# Patient Record
Sex: FEMALE | Race: WHITE | Employment: OTHER | URBAN - METROPOLITAN AREA
[De-identification: names, ages, dates, MRNs, and addresses within clinical notes are randomized per-mention and may not be internally consistent; named-entity substitution may affect disease eponyms.]

---

## 2021-03-17 ENCOUNTER — APPOINTMENT (OUTPATIENT)
Dept: CT IMAGING | Age: 76
DRG: 457 | End: 2021-03-17
Attending: EMERGENCY MEDICINE
Payer: MEDICARE

## 2021-03-17 ENCOUNTER — APPOINTMENT (OUTPATIENT)
Dept: MRI IMAGING | Age: 76
DRG: 457 | End: 2021-03-17
Attending: EMERGENCY MEDICINE
Payer: MEDICARE

## 2021-03-17 ENCOUNTER — HOSPITAL ENCOUNTER (INPATIENT)
Age: 76
LOS: 8 days | Discharge: NURSING, REHAB OR LONG-TERM CARE | DRG: 457 | End: 2021-03-25
Attending: EMERGENCY MEDICINE | Admitting: INTERNAL MEDICINE
Payer: MEDICARE

## 2021-03-17 DIAGNOSIS — S22.080A T12 COMPRESSION FRACTURE, INITIAL ENCOUNTER (HCC): Primary | ICD-10-CM

## 2021-03-17 DIAGNOSIS — M62.830 MUSCLE SPASM OF BACK: ICD-10-CM

## 2021-03-17 PROBLEM — M54.9 BACK PAIN: Status: ACTIVE | Noted: 2021-03-17

## 2021-03-17 PROBLEM — S22.000A COMPRESSION FRACTURE OF BODY OF THORACIC VERTEBRA (HCC): Status: ACTIVE | Noted: 2021-03-17

## 2021-03-17 LAB
GLUCOSE BLD STRIP.AUTO-MCNC: 108 MG/DL (ref 65–100)
SERVICE CMNT-IMP: ABNORMAL

## 2021-03-17 PROCEDURE — 72131 CT LUMBAR SPINE W/O DYE: CPT

## 2021-03-17 PROCEDURE — 82962 GLUCOSE BLOOD TEST: CPT

## 2021-03-17 PROCEDURE — 65270000029 HC RM PRIVATE

## 2021-03-17 PROCEDURE — 74011250637 HC RX REV CODE- 250/637: Performed by: EMERGENCY MEDICINE

## 2021-03-17 PROCEDURE — 99285 EMERGENCY DEPT VISIT HI MDM: CPT

## 2021-03-17 PROCEDURE — 74011250637 HC RX REV CODE- 250/637: Performed by: INTERNAL MEDICINE

## 2021-03-17 PROCEDURE — 74011250636 HC RX REV CODE- 250/636: Performed by: EMERGENCY MEDICINE

## 2021-03-17 PROCEDURE — 74011000250 HC RX REV CODE- 250: Performed by: EMERGENCY MEDICINE

## 2021-03-17 PROCEDURE — 74011250636 HC RX REV CODE- 250/636: Performed by: INTERNAL MEDICINE

## 2021-03-17 PROCEDURE — 72146 MRI CHEST SPINE W/O DYE: CPT

## 2021-03-17 RX ORDER — DIAZEPAM 5 MG/1
2.5 TABLET ORAL
Status: COMPLETED | OUTPATIENT
Start: 2021-03-17 | End: 2021-03-17

## 2021-03-17 RX ORDER — SODIUM CHLORIDE 0.9 % (FLUSH) 0.9 %
5-40 SYRINGE (ML) INJECTION EVERY 8 HOURS
Status: DISCONTINUED | OUTPATIENT
Start: 2021-03-17 | End: 2021-03-19 | Stop reason: SDUPTHER

## 2021-03-17 RX ORDER — MAGNESIUM SULFATE 100 %
4 CRYSTALS MISCELLANEOUS AS NEEDED
Status: DISCONTINUED | OUTPATIENT
Start: 2021-03-17 | End: 2021-03-25 | Stop reason: HOSPADM

## 2021-03-17 RX ORDER — DEXTROSE 50 % IN WATER (D50W) INTRAVENOUS SYRINGE
12.5-25 AS NEEDED
Status: DISCONTINUED | OUTPATIENT
Start: 2021-03-17 | End: 2021-03-25 | Stop reason: HOSPADM

## 2021-03-17 RX ORDER — MORPHINE SULFATE 2 MG/ML
4 INJECTION, SOLUTION INTRAMUSCULAR; INTRAVENOUS
Status: COMPLETED | OUTPATIENT
Start: 2021-03-17 | End: 2021-03-18

## 2021-03-17 RX ORDER — PROMETHAZINE HYDROCHLORIDE 25 MG/1
12.5 TABLET ORAL
Status: DISCONTINUED | OUTPATIENT
Start: 2021-03-17 | End: 2021-03-25 | Stop reason: HOSPADM

## 2021-03-17 RX ORDER — KETOROLAC TROMETHAMINE 30 MG/ML
15 INJECTION, SOLUTION INTRAMUSCULAR; INTRAVENOUS ONCE
Status: COMPLETED | OUTPATIENT
Start: 2021-03-17 | End: 2021-03-17

## 2021-03-17 RX ORDER — ONDANSETRON 2 MG/ML
4 INJECTION INTRAMUSCULAR; INTRAVENOUS
Status: DISCONTINUED | OUTPATIENT
Start: 2021-03-17 | End: 2021-03-25 | Stop reason: HOSPADM

## 2021-03-17 RX ORDER — INSULIN LISPRO 100 [IU]/ML
INJECTION, SOLUTION INTRAVENOUS; SUBCUTANEOUS
Status: DISCONTINUED | OUTPATIENT
Start: 2021-03-18 | End: 2021-03-25 | Stop reason: HOSPADM

## 2021-03-17 RX ORDER — ACETAMINOPHEN 325 MG/1
650 TABLET ORAL
Status: DISCONTINUED | OUTPATIENT
Start: 2021-03-17 | End: 2021-03-25 | Stop reason: HOSPADM

## 2021-03-17 RX ORDER — SODIUM CHLORIDE 0.9 % (FLUSH) 0.9 %
5-40 SYRINGE (ML) INJECTION AS NEEDED
Status: DISCONTINUED | OUTPATIENT
Start: 2021-03-17 | End: 2021-03-19 | Stop reason: SDUPTHER

## 2021-03-17 RX ORDER — POLYETHYLENE GLYCOL 3350 17 G/17G
17 POWDER, FOR SOLUTION ORAL DAILY PRN
Status: DISCONTINUED | OUTPATIENT
Start: 2021-03-17 | End: 2021-03-21

## 2021-03-17 RX ORDER — LIDOCAINE 4 G/100G
1 PATCH TOPICAL
Status: COMPLETED | OUTPATIENT
Start: 2021-03-17 | End: 2021-03-18

## 2021-03-17 RX ORDER — HYDRALAZINE HYDROCHLORIDE 20 MG/ML
10 INJECTION INTRAMUSCULAR; INTRAVENOUS
Status: DISCONTINUED | OUTPATIENT
Start: 2021-03-17 | End: 2021-03-19

## 2021-03-17 RX ORDER — MORPHINE SULFATE 2 MG/ML
2 INJECTION, SOLUTION INTRAMUSCULAR; INTRAVENOUS
Status: DISCONTINUED | OUTPATIENT
Start: 2021-03-17 | End: 2021-03-18 | Stop reason: ALTCHOICE

## 2021-03-17 RX ORDER — ACETAMINOPHEN 650 MG/1
650 SUPPOSITORY RECTAL
Status: DISCONTINUED | OUTPATIENT
Start: 2021-03-17 | End: 2021-03-25 | Stop reason: HOSPADM

## 2021-03-17 RX ADMIN — KETOROLAC TROMETHAMINE 15 MG: 30 INJECTION, SOLUTION INTRAMUSCULAR at 15:52

## 2021-03-17 RX ADMIN — DIAZEPAM 2.5 MG: 5 TABLET ORAL at 15:51

## 2021-03-17 RX ADMIN — ACETAMINOPHEN 650 MG: 325 TABLET ORAL at 20:55

## 2021-03-17 RX ADMIN — DIAZEPAM 2.5 MG: 5 TABLET ORAL at 18:48

## 2021-03-17 RX ADMIN — MORPHINE SULFATE 2 MG: 2 INJECTION, SOLUTION INTRAMUSCULAR; INTRAVENOUS at 18:49

## 2021-03-17 NOTE — CONSULTS
76yo with progressive back pain that started 2 weeks ago. She thought she just strained her back doing housework but the pain has progressed to the point it is intolerable. No neurologic deficit. CT shows compression T12 with some retropulsion. Agree with plan for MRI and pain control. MRI should be with and without contrast to rule out underlying lesion (pathologic fracture). Further recommendations when MRI completed. Thank you for this consultation.

## 2021-03-17 NOTE — H&P
Hospitalist Admission Note    NAME: Sotero Medel   :     MRN:  [id number]     Date/Time:  3/17/2021 6:46 PM    Patient PCP: Mannie Caban MD  ______________________________________________________________________  Given the patient's current clinical presentation, I have a high level of concern for decompensation if discharged from the emergency department. Complex decision making was performed, which includes reviewing the patient's available past medical records, laboratory results, and x-ray films. My assessment of this patient's clinical condition and my plan of care is as follows. Assessment / Plan:  Severe lumbago POA  Severe compression deformity at T12 subacute/acute POA    CT spine  Shows t12 compression deformity and 5mm retropulsion of bone at superior endplate and central stenosis at T11-12.  -Neurosurgery called by ED  -MRI spine pending  -Pain control  -If MRI spine positive for acute deformity, will ask IR for kyphoplasty  -May need spinal brace  -PT/OT evaluation      History of hypertension, history of diabetes mellitus  SSI    Code Status: Full Code  Surrogate Decision Maker:sister    DVT Prophylaxis: SCD  GI Prophylaxis: not indicated    Baseline: ambulatory      Subjective:   CHIEF COMPLAINT: Pain    HISTORY OF PRESENT ILLNESS:     Sotero Medel is a 77 y.o.  female with a past medical history of hypertension and diabetes mellitus, scented to ED with a chief complaint of back pain. She reported that 2 weeks ago she pulled her back, and since then she has been having lower back pain, which is getting worse. She also complains of muscle spasm on the right hip. Due to the severity of pain she could not ambulate. She went to urgent care, and given steroids, which did not help. She had a CT spine in the ED, which showed compression deformity at T12, neurosurgery was consulted, recommends MRI and may need brace.   Patient currently in severe pain, unable to lay flat for a SLRT    We were asked to admit for work up and evaluation of the above problems. History reviewed. No pertinent past medical history. No past surgical history on file. Social History     Tobacco Use    Smoking status: Not on file   Substance Use Topics    Alcohol use: Not on file        History reviewed. No pertinent family history. Allergies   Allergen Reactions    Pcn [Penicillins] Hives        Prior to Admission medications    Not on File       REVIEW OF SYSTEMS:     I am not able to complete the review of systems because:    The patient is intubated and sedated    The patient has altered mental status due to his acute medical problems    The patient has baseline aphasia from prior stroke(s)    The patient has baseline dementia and is not reliable historian    The patient is in acute medical distress and unable to provide information           Total of 12 systems reviewed as follows:       POSITIVE= underlined text  Negative = text not underlined  General:  fever, chills, sweats, generalized weakness, weight loss/gain,      loss of appetite   Eyes:    blurred vision, eye pain, loss of vision, double vision  ENT:    rhinorrhea, pharyngitis   Respiratory:   cough, sputum production, SOB, COULTER, wheezing, pleuritic pain   Cardiology:   chest pain, palpitations, orthopnea, PND, edema, syncope   Gastrointestinal:  abdominal pain , N/V, diarrhea, dysphagia, constipation, bleeding   Genitourinary:  frequency, urgency, dysuria, hematuria, incontinence   Muskuloskeletal :  arthralgia, myalgia, back pain  Hematology:  easy bruising, nose or gum bleeding, lymphadenopathy   Dermatological: rash, ulceration, pruritis, color change / jaundice  Endocrine:   hot flashes or polydipsia   Neurological:  headache, dizziness, confusion, focal weakness, paresthesia,     Speech difficulties, memory loss, gait difficulty  Psychological: Feelings of anxiety, depression, agitation    Objective:   VITALS:    Visit Vitals  BP (!) 141/65   Pulse (!) 104   Temp 98.2 °F (36.8 °C)   Resp 16   Ht 5' 7\" (1.702 m)   Wt 68 kg (150 lb)   SpO2 94%   BMI 23.49 kg/m²       PHYSICAL EXAM:    General:    Alert, cooperative, no distress, appears stated age. HEENT: Atraumatic, anicteric sclerae, pink conjunctivae  Neck:  Supple, symmetrical,  thyroid: non tender  Lungs:   Clear to auscultation bilaterally. No Wheezing or Rhonchi. No rales. Chest wall:  No tenderness  No Accessory muscle use. Heart:   Regular  rhythm,  No  murmur   No edema  Abdomen:   Soft, non-tender. Not distended. Bowel sounds normal  Extremities: No cyanosis. No clubbing,    Skin:     Not pale. Not Jaundiced  No rashes   Psych:  Good insight. Not depressed. Not anxious or agitated. Neurologic: EOMs intact. No facial asymmetry. No aphasia or slurred speech. Symmetrical strength, Sensation grossly intact. Alert and oriented X 4. No spinal tenderness. _______________________________________________________________________  Care Plan discussed with:    Comments   Patient x    Family      RN x    Care Manager                    Consultant:      _______________________________________________________________________  Expected  Disposition:   Home with Family x   HH/PT/OT/RN    SNF/LTC    [facility]CAR    ________________________________________________________________________  TOTAL TIME:  29 Minutes    Critical Care Provided     Minutes non procedure based      Comments     Reviewed previous records   >50% of visit spent in counseling and coordination of care  Discussion with patient and/or family and questions answered       ________________________________________________________________________  Signed: Elin Qureshi MD    Procedures: see electronic medical records for all procedures/Xrays and details which were not copied into this note but were reviewed prior to creation of Plan.     LAB DATA REVIEWED:    No results found for this or any previous visit (from the past 24 hour(s)).

## 2021-03-17 NOTE — ROUTINE PROCESS

## 2021-03-17 NOTE — ED NOTES
TRANSFER - OUT REPORT:    Verbal report given to Sim RN(name) on Valerie Ros  being transferred to room 3214(unit) for routine progression of care       Report consisted of patients Situation, Background, Assessment and   Recommendations(SBAR). Information from the following report(s) SBAR, Kardex, ED Summary, Recent Results and Med Rec Status was reviewed with the receiving nurse. Lines:       Opportunity for questions and clarification was provided.       Patient transported with:   [employer]

## 2021-03-17 NOTE — ED NOTES
Patient belongings brought to MRI. Included a zipped purse and patient ED Chart.  MRI staff will transport pt to her IP room

## 2021-03-17 NOTE — ED NOTES
Bedside shift change report given to [address] (oncoming nurse) by Eboni Harden (offgoing nurse). Report included the following information SBAR, ED Summary, MAR and Recent Results.

## 2021-03-17 NOTE — ED PROVIDER NOTES
EMERGENCY DEPARTMENT HISTORY AND PHYSICAL EXAM      Date: 3/17/2021  Patient Name: Jeff Sheppard    History of Presenting Illness     Chief Complaint   Patient presents with    Back Pain     pt pulled her back while housecleaning at end of february and pain has gotten so bad that she is nonambulatory at this point. low back pain with spasms radiating down the fronts of both legs. History Provided By: Patient    HPI: Jeff Sheppard, 77 y.o. female  presents to the ED with cc of severe low back pain. Patient states she has pain in her low back that radiates down both hips and into the front of both of her thighs. She states the pain has been present since for about 2 weeks but is getting worse. She believes she may have done something while she was cleaning her house. She has been seen by urgent care and was started on a prednisone Dosepak, Robaxin, and Tylenol 3 without any relief. No trouble with bowel or bladder function. She is not have any urinary retention or incontinence. She does have a history of back injury but no prior surgeries. Past History     Past Medical History:  History reviewed. No pertinent past medical history. Past Surgical History:  No past surgical history on file. Medications:  No current facility-administered medications on file prior to encounter. No current outpatient medications on file prior to encounter. Family History:  History reviewed. No pertinent family history. Social History:  Social History     Tobacco Use    Smoking status: Not on file   Substance Use Topics    Alcohol use: Not on file    Drug use: Not on file       Allergies: Allergies   Allergen Reactions    Pcn [Penicillins] Hives       All the above components of the past  history are auto-populated from the electronic record.   They have been reviewed and the patient has been interviewed for any pertinent past history that pertains to the patient's chief complaint and reason for visit. Not all pre-populated components may be accurate at the time this note was generated. Review of Systems   Review of Systems   Constitutional: Negative for chills and fever. HENT: Negative for congestion, ear pain, rhinorrhea, sore throat and trouble swallowing. Eyes: Negative for visual disturbance. Respiratory: Negative for cough, chest tightness and shortness of breath. Cardiovascular: Negative for chest pain and palpitations. Gastrointestinal: Negative for abdominal pain, blood in stool, constipation, diarrhea, nausea and vomiting. Genitourinary: Negative for decreased urine volume, difficulty urinating, dysuria and frequency. Musculoskeletal: Positive for back pain. Negative for neck pain. Skin: Negative for color change and rash. Neurological: Negative for dizziness, weakness, light-headedness and headaches. Physical Exam   Physical Exam  Vitals signs and nursing note reviewed. Constitutional:       General: She is not in acute distress. Appearance: She is well-developed. She is not ill-appearing. HENT:      Head: Normocephalic. Eyes:      Conjunctiva/sclera: Conjunctivae normal.   Neck:      Musculoskeletal: Normal range of motion. Cardiovascular:      Rate and Rhythm: Normal rate and regular rhythm. Pulmonary:      Effort: Pulmonary effort is normal. No accessory muscle usage or respiratory distress. Abdominal:      General: There is no distension. Musculoskeletal:      Lumbar back: She exhibits decreased range of motion, pain and spasm. She exhibits no bony tenderness. Back:    Skin:     General: Skin is warm and dry. Neurological:      Mental Status: She is alert and oriented to person, place, and time.          Due to the COVID-19 pandemic, in order to reduce the spread and transmission of the virus, some basic elements of the physical exam have been deferred to reduce direct or close contact with the patient unless they are deemed to be absolutely necessary, regardless of whether the virus is highly suspected or not. Diagnostic Study Results     Labs -   No results found for this or any previous visit (from the past 24 hour(s)). Radiologic Studies -   CT SPINE LUMB WO CONT   Final Result   . IMPRESSION:    1. Severe compression deformity at T12, which may be subacute or acute, with 5   mm associated retropulsion of bone at the superior endplate and central stenosis   at T11-12. 2. Multilevel degenerative change otherwise with central and foraminal stenosis. 3. Left adrenal adenoma or cyst...   4. Generalized osteopenia with a single focal area of low density in the   vertebral body of L2, of uncertain if any clinical significance. Correlate with   clinical history. Further evaluation would require lumbar spine MRI. MRI [facility] SPINE WO CONT    (Results Pending)     CT Results  (Last 48 hours)               03/17/21 1624  CT SPINE LUMB WO CONT Final result    Impression:  . IMPRESSION:    1. Severe compression deformity at T12, which may be subacute or acute, with 5   mm associated retropulsion of bone at the superior endplate and central stenosis   at T11-12. 2. Multilevel degenerative change otherwise with central and foraminal stenosis. 3. Left adrenal adenoma or cyst...   4. Generalized osteopenia with a single focal area of low density in the   vertebral body of L2, of uncertain if any clinical significance. Correlate with   clinical history. Further evaluation would require lumbar spine MRI. Narrative:  ADDITIONAL INDICATION:  severe back pain, spasm       COMPARISON: None. TECHNIQUE: Multislice helical CT of the lumbar spine was performed. Sagittal and   coronal reformations were generated. CT dose reduction was achieved through use   of a standardized protocol tailored for this examination and automatic exposure   control for dose modulation.         FINDINGS:   The alignment of the lumbar spine is normal. Bone mineral content is diffusely   diminished, with a focal low density 1.2 cm in diameter in the vertebral body of   L2 to the right of midline. Vertebral body height is significantly diminished at   T12, as described below. Other vertebral bodies are preserved. Disc space   heights are diminished diffusely, with endplate sclerosis and spondylosis. .   Incidentally noted left adrenal mass measuring -2 Hounsfield units, 3.0 x 2.8 cm   in size. Heavy vascular calcification without aneurysm. Mild rectal fecal   distention. T12: Severe compression deformity, age indeterminate but possibly subacute or   acute, with retropulsion of bone by 5 mm at the superior endplate and associated   central stenosis at T11-12. L4-5: 4 mm anterolisthesis. CXR Results  (Last 48 hours)    None            Medical Decision Making     I reviewed the vital signs, available nursing notes, past medical history, past surgical history, family history and social history. Vital Signs-I have reviewed the vital signs that have been made available during the patient's emergency department visit. The vital signs auto-populated below are obtained mostly by electronic means through monitoring devices that have been downloaded into the patient's chart by the nursing staff. Some vital signs are not downloaded into the chart until after the patient has been discharged and this note has been completed, therefore some vital signs may not be available to the physician for review prior to patient's discharge or admission. The physician has reviewed the patient's triage vital signs, monitored the electronic monitoring devices remotely for any significant vital sign abnormalities, and have reviewed vital signs prior to discharge. Some vital signs reviewed at bedside or remotely utilizing electronic monitoring devices may be different than the vital signs downloaded into the electronic medical record.   Some vital signs may be erroneous and inaccurate since they are obtained by electronic monitoring devices, and not all vital signs are verified for accuracy by nursing staff prior to downloading into the patient's chart. Patient Vitals for the past 24 hrs:   Temp Pulse Resp BP SpO2   03/17/21 1530 -- -- -- (!) 141/65 94 %   03/17/21 1500 -- -- -- (!) 146/64 94 %   03/17/21 1430 -- -- -- (!) 160/66 95 %   03/17/21 1346 98.2 °F (36.8 °C) (!) 104 16 (!) 144/65 97 %         Records Reviewed: Nursing notes for today's visit have been reviewed. I have also reviewed most recent medical records pertinent to today's complaints, if available in our medical record system. I have also reviewed all labs and imaging results from previous results in comparison to results obtained today. If an EKG was obtained today, it has been compared to previous EKGs, if available. If arriving via EMS, the EMS report has been reviewed if made available to us within the patient's time in the emergency department. Provider Notes (Medical Decision Making):   Patient presents with severe back pain and spasms. No trauma or fall. Patient has pain going down bilateral thighs. Obtained a CT scan of her lumbar spine initially which shows a pretty severe T12 compression fracture with retropulsion into her spinal canal.  This would certainly explain her pain. Pain is better after muscle relaxers including Valium. She had been failing Robaxin, prednisolone, and codeine at home. Consulted with neurosurgery who is recommending MRI to determine age of the fracture and whether she is a candidate for kyphoplasty. Will recommend admission overnight to get her MRI, pain control, and consultation for kyphoplasty if candidate. ED Course:   Initial assessment performed. The patients presenting problems have been discussed, and they are in agreement with the care plan formulated and outlined with them.   I have encouraged them to ask questions as they arise throughout their visit. Orders Placed This Encounter    CT SPINE LUMB WO CONT    MRI [facility] SPINE WO CONT    DIET REGULAR    NOTIFY PROVIDER: SPECIFY Notify provider on pt's arrival to floor ONE TIME STAT    OUT OF BED WITH ASSISTANCE    VITAL SIGNS PER UNIT ROUTINE    FULL CODE    diazePAM (VALIUM) tablet 2.5 mg    lidocaine 4 % patch 1 Patch    ketorolac (TORADOL) injection 15 mg    diazePAM (VALIUM) tablet 2.5 mg    acetaminophen (TYLENOL) tablet 650 mg    morphine injection 4 mg    ondansetron (ZOFRAN) injection 4 mg    IP CONSULT TO HOSPITALIST    IP CONSULT TO NEUROSURGERY       Procedures      Critical Care Time:   0    Disposition:  Admit        Diagnosis     Clinical Impression:   1. T12 compression fracture, initial encounter ([facility] [address]    2.  Muscle spasm of back

## 2021-03-18 ENCOUNTER — APPOINTMENT (OUTPATIENT)
Dept: GENERAL RADIOLOGY | Age: 76
DRG: 457 | End: 2021-03-18
Attending: NURSE PRACTITIONER
Payer: MEDICARE

## 2021-03-18 ENCOUNTER — APPOINTMENT (OUTPATIENT)
Dept: VASCULAR SURGERY | Age: 76
DRG: 457 | End: 2021-03-18
Attending: NURSE PRACTITIONER
Payer: MEDICARE

## 2021-03-18 ENCOUNTER — APPOINTMENT (OUTPATIENT)
Dept: GENERAL RADIOLOGY | Age: 76
DRG: 457 | End: 2021-03-18
Attending: NEUROLOGICAL SURGERY
Payer: MEDICARE

## 2021-03-18 ENCOUNTER — ANESTHESIA EVENT (OUTPATIENT)
Dept: SURGERY | Age: 76
DRG: 457 | End: 2021-03-18
Payer: MEDICARE

## 2021-03-18 LAB
ABO + RH BLD: NORMAL
ANION GAP SERPL CALC-SCNC: 5 MMOL/L (ref 5–15)
APTT PPP: 24.7 SEC (ref 22.1–31)
BASOPHILS # BLD: 0.1 K/UL (ref 0–0.1)
BASOPHILS NFR BLD: 2 % (ref 0–1)
BLOOD GROUP ANTIBODIES SERPL: NORMAL
BUN SERPL-MCNC: 13 MG/DL (ref 6–20)
BUN/CREAT SERPL: 22 (ref 12–20)
CALCIUM SERPL-MCNC: 9.7 MG/DL (ref 8.5–10.1)
CHLORIDE SERPL-SCNC: 104 MMOL/L (ref 97–108)
CO2 SERPL-SCNC: 27 MMOL/L (ref 21–32)
CREAT SERPL-MCNC: 0.59 MG/DL (ref 0.55–1.02)
DIFFERENTIAL METHOD BLD: ABNORMAL
EOSINOPHIL # BLD: 0 K/UL (ref 0–0.4)
EOSINOPHIL NFR BLD: 0 % (ref 0–7)
ERYTHROCYTE [DISTWIDTH] IN BLOOD BY AUTOMATED COUNT: 14.9 % (ref 11.5–14.5)
GLUCOSE BLD STRIP.AUTO-MCNC: 105 MG/DL (ref 65–100)
GLUCOSE BLD STRIP.AUTO-MCNC: 107 MG/DL (ref 65–100)
GLUCOSE BLD STRIP.AUTO-MCNC: 93 MG/DL (ref 65–100)
GLUCOSE BLD STRIP.AUTO-MCNC: 93 MG/DL (ref 65–100)
GLUCOSE SERPL-MCNC: 89 MG/DL (ref 65–100)
HCT VFR BLD AUTO: 33.1 % (ref 35–47)
HGB BLD-MCNC: 10.5 G/DL (ref 11.5–16)
IMM GRANULOCYTES # BLD AUTO: 0 K/UL (ref 0–0.04)
IMM GRANULOCYTES NFR BLD AUTO: 0 % (ref 0–0.5)
INR PPP: 1 (ref 0.9–1.1)
LYMPHOCYTES # BLD: 2.5 K/UL (ref 0.8–3.5)
LYMPHOCYTES NFR BLD: 47 % (ref 12–49)
MCH RBC QN AUTO: 32.7 PG (ref 26–34)
MCHC RBC AUTO-ENTMCNC: 31.7 G/DL (ref 30–36.5)
MCV RBC AUTO: 103.1 FL (ref 80–99)
MONOCYTES # BLD: 0.4 K/UL (ref 0–1)
MONOCYTES NFR BLD: 8 % (ref 5–13)
NEUTS SEG # BLD: 2.3 K/UL (ref 1.8–8)
NEUTS SEG NFR BLD: 43 % (ref 32–75)
NRBC # BLD: 0 K/UL (ref 0–0.01)
NRBC BLD-RTO: 0 PER 100 WBC
PLATELET # BLD AUTO: 421 K/UL (ref 150–400)
PMV BLD AUTO: 8.6 FL (ref 8.9–12.9)
POTASSIUM SERPL-SCNC: 4 MMOL/L (ref 3.5–5.1)
PROTHROMBIN TIME: 10.2 SEC (ref 9–11.1)
RBC # BLD AUTO: 3.21 M/UL (ref 3.8–5.2)
RBC MORPH BLD: ABNORMAL
SERVICE CMNT-IMP: ABNORMAL
SERVICE CMNT-IMP: ABNORMAL
SERVICE CMNT-IMP: NORMAL
SERVICE CMNT-IMP: NORMAL
SODIUM SERPL-SCNC: 136 MMOL/L (ref 136–145)
SPECIMEN EXP DATE BLD: NORMAL
THERAPEUTIC RANGE,PTTT: NORMAL SECS (ref 58–77)
WBC # BLD AUTO: 5.3 K/UL (ref 3.6–11)

## 2021-03-18 PROCEDURE — 74011250637 HC RX REV CODE- 250/637: Performed by: NURSE PRACTITIONER

## 2021-03-18 PROCEDURE — 74011250637 HC RX REV CODE- 250/637: Performed by: INTERNAL MEDICINE

## 2021-03-18 PROCEDURE — 85730 THROMBOPLASTIN TIME PARTIAL: CPT

## 2021-03-18 PROCEDURE — 74011250636 HC RX REV CODE- 250/636: Performed by: NURSE PRACTITIONER

## 2021-03-18 PROCEDURE — 93971 EXTREMITY STUDY: CPT

## 2021-03-18 PROCEDURE — 73090 X-RAY EXAM OF FOREARM: CPT

## 2021-03-18 PROCEDURE — 85610 PROTHROMBIN TIME: CPT

## 2021-03-18 PROCEDURE — 86901 BLOOD TYPING SEROLOGIC RH(D): CPT

## 2021-03-18 PROCEDURE — 82962 GLUCOSE BLOOD TEST: CPT

## 2021-03-18 PROCEDURE — 85025 COMPLETE CBC W/AUTO DIFF WBC: CPT

## 2021-03-18 PROCEDURE — 80048 BASIC METABOLIC PNL TOTAL CA: CPT

## 2021-03-18 PROCEDURE — 74011000250 HC RX REV CODE- 250: Performed by: NURSE PRACTITIONER

## 2021-03-18 PROCEDURE — 65270000029 HC RM PRIVATE

## 2021-03-18 PROCEDURE — 71045 X-RAY EXAM CHEST 1 VIEW: CPT

## 2021-03-18 PROCEDURE — 36415 COLL VENOUS BLD VENIPUNCTURE: CPT

## 2021-03-18 PROCEDURE — 74011250636 HC RX REV CODE- 250/636: Performed by: INTERNAL MEDICINE

## 2021-03-18 PROCEDURE — 73060 X-RAY EXAM OF HUMERUS: CPT

## 2021-03-18 RX ORDER — CYCLOBENZAPRINE HCL 10 MG
10 TABLET ORAL
Status: DISCONTINUED | OUTPATIENT
Start: 2021-03-18 | End: 2021-03-25 | Stop reason: HOSPADM

## 2021-03-18 RX ORDER — LIDOCAINE 4 G/100G
1 PATCH TOPICAL EVERY 24 HOURS
Status: DISCONTINUED | OUTPATIENT
Start: 2021-03-18 | End: 2021-03-25 | Stop reason: HOSPADM

## 2021-03-18 RX ORDER — DOCUSATE SODIUM 100 MG/1
100 CAPSULE, LIQUID FILLED ORAL DAILY
Status: DISCONTINUED | OUTPATIENT
Start: 2021-03-19 | End: 2021-03-21

## 2021-03-18 RX ORDER — BACLOFEN 10 MG/1
10 TABLET ORAL 3 TIMES DAILY
Status: DISCONTINUED | OUTPATIENT
Start: 2021-03-18 | End: 2021-03-23

## 2021-03-18 RX ORDER — HYDROMORPHONE HYDROCHLORIDE 1 MG/ML
1 INJECTION, SOLUTION INTRAMUSCULAR; INTRAVENOUS; SUBCUTANEOUS
Status: DISCONTINUED | OUTPATIENT
Start: 2021-03-18 | End: 2021-03-18

## 2021-03-18 RX ORDER — HYDROMORPHONE HYDROCHLORIDE 1 MG/ML
2 INJECTION, SOLUTION INTRAMUSCULAR; INTRAVENOUS; SUBCUTANEOUS
Status: DISCONTINUED | OUTPATIENT
Start: 2021-03-18 | End: 2021-03-21

## 2021-03-18 RX ORDER — SODIUM CHLORIDE 9 MG/ML
75 INJECTION, SOLUTION INTRAVENOUS CONTINUOUS
Status: DISCONTINUED | OUTPATIENT
Start: 2021-03-18 | End: 2021-03-21

## 2021-03-18 RX ADMIN — SODIUM CHLORIDE 75 ML/HR: 9 INJECTION, SOLUTION INTRAVENOUS at 12:32

## 2021-03-18 RX ADMIN — MORPHINE SULFATE 2 MG: 2 INJECTION, SOLUTION INTRAMUSCULAR; INTRAVENOUS at 00:01

## 2021-03-18 RX ADMIN — ACETAMINOPHEN 650 MG: 325 TABLET ORAL at 07:48

## 2021-03-18 RX ADMIN — CYCLOBENZAPRINE 10 MG: 10 TABLET, FILM COATED ORAL at 05:35

## 2021-03-18 RX ADMIN — BACLOFEN 10 MG: 10 TABLET ORAL at 21:39

## 2021-03-18 RX ADMIN — CYCLOBENZAPRINE 10 MG: 10 TABLET, FILM COATED ORAL at 14:32

## 2021-03-18 RX ADMIN — HYDROMORPHONE HYDROCHLORIDE 2 MG: 1 INJECTION, SOLUTION INTRAMUSCULAR; INTRAVENOUS; SUBCUTANEOUS at 16:16

## 2021-03-18 RX ADMIN — HYDROMORPHONE HYDROCHLORIDE 2 MG: 1 INJECTION, SOLUTION INTRAMUSCULAR; INTRAVENOUS; SUBCUTANEOUS at 20:35

## 2021-03-18 RX ADMIN — MORPHINE SULFATE 4 MG: 2 INJECTION, SOLUTION INTRAMUSCULAR; INTRAVENOUS at 04:09

## 2021-03-18 RX ADMIN — HYDROMORPHONE HYDROCHLORIDE 1 MG: 1 INJECTION, SOLUTION INTRAMUSCULAR; INTRAVENOUS; SUBCUTANEOUS at 08:48

## 2021-03-18 RX ADMIN — MORPHINE SULFATE 4 MG: 2 INJECTION, SOLUTION INTRAMUSCULAR; INTRAVENOUS at 07:50

## 2021-03-18 RX ADMIN — BACLOFEN 10 MG: 10 TABLET ORAL at 18:06

## 2021-03-18 RX ADMIN — BACLOFEN 10 MG: 10 TABLET ORAL at 06:43

## 2021-03-18 RX ADMIN — HYDROMORPHONE HYDROCHLORIDE 2 MG: 1 INJECTION, SOLUTION INTRAMUSCULAR; INTRAVENOUS; SUBCUTANEOUS at 12:37

## 2021-03-18 NOTE — PROGRESS NOTES
Occupational Therapy  Orders received and chart reviewed. Noted patient has intractable pain and is scheduled for T10-L2 fusion and decompression tomorrow. Will hold therapy for today and follow up post-operatively.

## 2021-03-18 NOTE — PROGRESS NOTES
Full consult dictated. Patient has severe compression fracture with intractable pain. Will take to OR tomorrow for T10-L2 fusion, decompression of spinal cord at T12, and biopsy of T12 vertebral body. Case is posted for noon. Risks and benefits were discussed with the patient and she has agreed to proceed. Okay to eat today and NPO after midnight.

## 2021-03-18 NOTE — PROGRESS NOTES
Problem: Falls - Risk of  Goal: *Absence of Falls  Description: Document Juwan Lanier Fall Risk and appropriate interventions in the flowsheet. Outcome: Progressing Towards Goal  Note: Fall Risk Interventions:  Mobility Interventions: Communicate number of staff needed for ambulation/transfer         Medication Interventions: Patient to call before getting OOB    Elimination Interventions: Patient to call for help with toileting needs    History of Falls Interventions: Investigate reason for fall, Room close to nurse's station         Problem: Patient Education: Go to Patient Education Activity  Goal: Patient/Family Education  Outcome: Progressing Towards Goal     Problem: Pressure Injury - Risk of  Goal: *Prevention of pressure injury  Description: Document Kwabena Scale and appropriate interventions in the flowsheet. Outcome: Progressing Towards Goal  Note: Pressure Injury Interventions:             Activity Interventions: Increase time out of bed    Mobility Interventions: Float heels    Nutrition Interventions: Document food/fluid/supplement intake    Friction and Shear Interventions: Lift sheet                Problem: Patient Education: Go to Patient Education Activity  Goal: Patient/Family Education  Outcome: Progressing Towards Goal

## 2021-03-18 NOTE — PROGRESS NOTES
MRI reviewed  Patient having intractable pain  Will discuss treatment options with patient   Given proximtiy of fracture to spinal cord, degree of kyphosis,  moderate to severe spinal canal narrowing, and intractable pain,this fracture may be better treated with instrumented stabilization than with kyphoplasty  Full note to follow once I have discussed options with patient

## 2021-03-18 NOTE — CONSULTS
[id number]    Name:  Domingo Joiner  MR#:  [id number]  :  1945  ACCOUNT #:  [de-identified]  DATE OF SERVICE:  2021    NEUROSURGERY CONSULTATION    REASON FOR CONSULTATION:  T12 fracture. HISTORY OF PRESENT ILLNESS:  This is a 67-year-old woman who started having back pain several weeks ago. She said she was cleaning her house and she felt a \"pull\" in her back. She said she did not think anything happened, but it became progressively worse. On Monday of this week, she went to [facility], they gave her pain medication, but she has been unable to weightbear for the past few days. She came to the emergency room where a CT scan of the spine revealed a compression fracture at T12, with retropulsion of bone towards the spinal canal.  She was able to move her legs, but was unable to stand secondary to pain. She was having excruciating muscle spasms. She was admitted overnight. She had an MRI overnight. Her pain has been only minimally controlled with Dilaudid and muscle relaxers. PAST MEDICAL HISTORY:  Diabetes, hypertension, osteoporosis. PAST SURGICAL HISTORY:  She has had 2 fractures to her left upper extremity, one was an olecranon fracture, repaired in 2018 at [facility]; second was I believe a humerus fracture that was repaired at [facility] in 2021, both caused by falls. No other surgical history. MEDICATIONS PRIOR TO ADMISSION:  Aspirin 81 mg daily; folic acid 1 mg daily; multivitamin; Crestor 5 mg at bedtime; tramadol; metformin, dosage not listed, daily; eye drops. Of note, these medications are found on the [facility] system. ALLERGIES:  PENICILLIN. SOCIAL HISTORY:  She is a daily smoker, no alcohol use. She lives alone. FAMILY HISTORY:  Noncontributory. REVIEW OF SYSTEMS:  Denies any headache, vision changes, hearing difficulty, chest pain, shortness of breath, abdominal pain, positive for severe thoracic pain.   No numbness, no difficulty with bowel or bladder. Sensory, grossly intact. Marked weakness in her left upper extremity after fall and surgery in 01/2021. PHYSICAL EXAMINATION:  GENERAL:  A well-developed thin woman, who is examined lying in the hospital bed. VITAL SIGNS:  Height 5 feet 7 inches, weight 150 pounds, BMI of 23.49, temperature 98.1, blood pressure 130/59, pulse 89. NEUROLOGIC:  She is alert. She is in moderate discomfort. She is oriented. Normal affect. Fluent speech. Conjugate gaze. Symmetric face. She has full strength in her right upper extremity in all muscle groups. In her left upper extremity, she has a surgical scar that is well healed. She has marked edema, pitting edema in her left upper extremity. She is lying on her shoulder because she is unable to move. She has a 0/5 in her biceps, brachioradialis, triceps, wrist extensor. She is able to move her fingers and has a 4/5 . Sensory is intact. Lower extremities, she has good strength in her lower extremities, but exam is limited due to excruciating back pain and muscle spasm happening frequently during examination as well as during our interview. Sensory is intact to light touch. IMAGING STUDIES:  She has a T12 compression fracture with retropulsion into the canal seen on CT scan last night as well as MRI. The MRI was performed without contrast, so it is difficult to assess if there is an underlying lesion. ASSESSMENT AND PLAN:  This is a 80-year-old woman with a comminuted compression fracture at T12 with retropulsion into the spinal canal.  The fracture abuts the spinal cord although it does not cause any cord signal change or obvious spinal cord compression. Risks and benefits of surgical stabilization were discussed. Surgery is  T10 to L2 pedicle screw and lm fusion with kyphon cement to anchor the screws as she does have a history of osteoporosis.   We will also use a biopsy needle to take a biopsy of the T12 vertebral body to rule out any underlying lesion that may have caused this fracture. Risks and benefits of surgery were discussed in detail and she understands. She has agreed to proceed. She will be scheduled for surgery tomorrow at noon.       MD TREE [email or web address]  D:  03/18/2021 11:49  T:  03/18/2021 17:49  JOB #:  [id number]

## 2021-03-18 NOTE — PROGRESS NOTES
Received notification from bedside RN about patient with regards to: persistent muscle spasm not relieved by Morphine  VS: /56, HR 82, RR 18, O2 sat 96% on RA    Intervention given: Flexeril prn ordered    0627: patient still yelling out in pain    - Baclofen TID added with first dose now

## 2021-03-18 NOTE — ANESTHESIA PREPROCEDURE EVALUATION
Relevant Problems   No relevant active problems       Anesthetic History   No history of anesthetic complications            Review of Systems / Medical History  Patient summary reviewed, nursing notes reviewed and pertinent labs reviewed    Pulmonary  Within defined limits                 Neuro/Psych         Dementia    Comments: T 12 compression deformity with T 11-12 stenosis Cardiovascular    Hypertension              Exercise tolerance: <4 METS     GI/Hepatic/Renal  Within defined limits              Endo/Other    Diabetes: well controlled, type 2    Arthritis     Other Findings   Comments: T 12 compr fx         Physical Exam    Airway  Mallampati: II  TM Distance: 4 - 6 cm  Neck ROM: normal range of motion   Mouth opening: Normal     Cardiovascular  Regular rate and rhythm,  S1 and S2 normal,  no murmur, click, rub, or gallop  Rhythm: regular  Rate: normal         Dental    Dentition: Caps/crowns and Implants  Comments: Some missing, denies any loose   Pulmonary  Breath sounds clear to auscultation               Abdominal  GI exam deferred       Other Findings   Comments: Patient has a left anterior shoulder dislocation of unknown duration.          Anesthetic Plan    ASA: 3  Anesthesia type: general    Monitoring Plan: BIS      Induction: Intravenous  Anesthetic plan and risks discussed with: Patient

## 2021-03-18 NOTE — CONSULTS
ORTHOPAEDIC CONSULT NOTE    Subjective:     Date of Consultation:  March 18, 2021      Geovanna Mckeon is a 77 y.o. female who is being seen for limited ROM/ use of L arm, pt is s/p ORIF of humerus fracture in Jan with hx of L olecranon fx 5 years ago. The L humerus ORIF was done at [facility] and pt has been following up as scheduled. Pt has had L wrist drop since the humerus surgery and wears a brace to help. Pt reports limited ROM of L shoulder since a fall 2 weeks ago, pt has suffered a compression fx with leg spasms and pain since the fall that will require surgery in the AM. The pt denies neck pain, LUE numbness tingling. Patient Active Problem List    Diagnosis Date Noted    Back pain 03/17/2021    Compression fracture of body of thoracic vertebra ([address] 03/17/2021     History reviewed. No pertinent family history. Social History     Tobacco Use    Smoking status: Not on file   Substance Use Topics    Alcohol use: Not on file     Past Medical History:   Diagnosis Date    Diabetes ([address]       No past surgical history on file.    Prior to Admission medications    Not on File     Current Facility-Administered Medications   Medication Dose Route Frequency    cyclobenzaprine (FLEXERIL) tablet 10 mg  10 mg Oral TID PRN    baclofen (LIORESAL) tablet 10 mg  10 mg Oral TID    0.9% sodium chloride infusion  75 mL/hr IntraVENous CONTINUOUS    HYDROmorphone (DILAUDID) injection 2 mg  2 mg IntraVENous Q4H PRN    lidocaine 4 % patch 1 Patch  1 Patch TransDERmal Q24H    acetaminophen (TYLENOL) tablet 650 mg  650 mg Oral Q6H PRN    ondansetron (ZOFRAN) injection 4 mg  4 mg IntraVENous Q4H PRN    sodium chloride (NS) flush 5-40 mL  5-40 mL IntraVENous Q8H    sodium chloride (NS) flush 5-40 mL  5-40 mL IntraVENous PRN    acetaminophen (TYLENOL) tablet 650 mg  650 mg Oral Q6H PRN    Or    acetaminophen (TYLENOL) suppository 650 mg  650 mg Rectal Q6H PRN    polyethylene glycol (MIRALAX) packet 17 g  17 g Oral DAILY PRN    promethazine (PHENERGAN) tablet 12.5 mg  12.5 mg Oral Q6H PRN    Or    ondansetron (ZOFRAN) injection 4 mg  4 mg IntraVENous Q6H PRN    insulin lispro (HUMALOG) injection   SubCUTAneous TIDAC    glucose chewable tablet 16 g  4 Tab Oral PRN    dextrose (D50W) injection syrg 12.5-25 g  12.5-25 g IntraVENous PRN    glucagon (GLUCAGEN) injection 1 mg  1 mg IntraMUSCular PRN    hydrALAZINE (APRESOLINE) 20 mg/mL injection 10 mg  10 mg IntraVENous Q2H PRN      Allergies   Allergen Reactions    Pcn [Penicillins] Hives        Review of Systems:  A comprehensive review of systems was negative except for that written in the HPI. Mental Status: no dementia    Objective:     Patient Vitals for the past 8 hrs:   BP Temp Pulse Resp SpO2   21 1058 (!) 130/59 98.1 °F (36.7 °C) 89 17 99 %   21 0740 (!) 151/89 98 °F (36.7 °C) 99 16 97 %     Temp (24hrs), Av.1 °F (36.7 °C), Min:97.9 °F (36.6 °C), Max:98.5 °F (36.9 °C)      Gen: Well-developed,  in no acute distress   Musc: LUE - limited AROM of shoulder and elbow, + PROM of L shoulder, pain free abduction to 90 degrees+ wrist drop with brace in place, no active finger MP extension, no active thumb extension. Palpable displaced midshaft clavicle fracture on the right, not swollen or tender, uses right upper extremity with force without difficulty. Skin: No skin breakdown noted. Skin warm, pink, dry. LUE incisions healed without signs of infection  Neuro: Cranial nerves are grossly intact, no focal motor weakness, follows commands appropriately   Psych: Good insight, oriented to person, place and time, alert    Imaging Review: images reviewed with Dr. Raisa Robison. L shoulder dislocation, s/p ORIF left humerus fracture with lm and plate and probable HO, displaced midshaft right clavicle fracture (ununited) on CXR.     Labs:   Recent Results (from the past 24 hour(s))   GLUCOSE, POC    Collection Time: 21  9:21 PM   Result Value Ref Range    Glucose (POC) 108 (H) 65 - 100 mg/dL    Performed by Haydee Bosworth    CBC WITH AUTOMATED DIFF    Collection Time: 03/18/21  3:42 AM   Result Value Ref Range    WBC 5.3 3.6 - 11.0 K/uL    RBC 3.21 (L) 3.80 - 5.20 M/uL    HGB 10.5 (L) 11.5 - 16.0 g/dL    HCT 33.1 (L) 35.0 - 47.0 %    .1 (H) 80.0 - 99.0 FL    MCH 32.7 26.0 - 34.0 PG    MCHC 31.7 30.0 - 36.5 g/dL    RDW 14.9 (H) 11.5 - 14.5 %    PLATELET 647 (H) 172 - 400 K/uL    MPV 8.6 (L) 8.9 - 12.9 FL    NRBC 0.0 0  WBC    ABSOLUTE NRBC 0.00 0.00 - 0.01 K/uL    NEUTROPHILS 43 32 - 75 %    LYMPHOCYTES 47 12 - 49 %    MONOCYTES 8 5 - 13 %    EOSINOPHILS 0 0 - 7 %    BASOPHILS 2 (H) 0 - 1 %    IMMATURE GRANULOCYTES 0 0.0 - 0.5 %    ABS. NEUTROPHILS 2.3 1.8 - 8.0 K/UL    ABS. LYMPHOCYTES 2.5 0.8 - 3.5 K/UL    ABS. MONOCYTES 0.4 0.0 - 1.0 K/UL    ABS. EOSINOPHILS 0.0 0.0 - 0.4 K/UL    ABS. BASOPHILS 0.1 0.0 - 0.1 K/UL    ABS. IMM.  GRANS. 0.0 0.00 - 0.04 K/UL    DF MANUAL      RBC COMMENTS NORMOCYTIC, NORMOCHROMIC     METABOLIC PANEL, BASIC    Collection Time: 03/18/21  3:42 AM   Result Value Ref Range    Sodium 136 136 - 145 mmol/L    Potassium 4.0 3.5 - 5.1 mmol/L    Chloride 104 97 - 108 mmol/L    CO2 27 21 - 32 mmol/L    Anion gap 5 5 - 15 mmol/L    Glucose 89 65 - 100 mg/dL    BUN 13 6 - 20 MG/DL    Creatinine 0.59 0.55 - 1.02 MG/DL    BUN/Creatinine ratio 22 (H) 12 - 20      GFR est AA >60 >60 ml/min/1.73m2    GFR est non-AA >60 >60 ml/min/1.73m2    Calcium 9.7 8.5 - 10.1 MG/DL   GLUCOSE, POC    Collection Time: 03/18/21  6:35 AM   Result Value Ref Range    Glucose (POC) 107 (H) 65 - 100 mg/dL    Performed by Haydee Bosworth          Impression:     Patient Active Problem List    Diagnosis Date Noted    Back pain 03/17/2021    Compression fracture of body of thoracic vertebra (HCC) 03/17/2021     Active Problems:    Back pain (3/17/2021)      Compression fracture of body of thoracic vertebra (HCC) (3/17/2021)        Plan:   -  L shoulder dislocation per XR - age indeterminate. Left humerus fracture appears well healed, with hardware in place. She states that she has not fallen recently, and has no idea how her shoulder could have become dislocated, or how her right clavicle could have fractured. Both these areas are nontender/not painful on motion. I therefore suspect that these are chronic problems. We discussed treatment options, and she agrees that we will have a look under fluoroscopic control in the OR tomorrow, after her spine stabilization,  If it appears that this is chronic and not reducible closed, she is not interested in open reduction at this point, as she is not having any pain. She is aware that there is a risk of humerus re-fracture with shoulder manipulation. We will, of course, try to avoid this. Navid Cleary MD     Addendum 3/19/21 - spoke with patient's sister, who lives in [place] and has medical POA. After discussing the above with her, including risks and benefits, she instructed me not to proceed with attempted closed reduction of the shoulder. She can follow-up with her physicians at [facility], who operated on her humerus, or follow up with me if a different decision is made in the future.     MD Ronni Butler, NP  Orthopedic Nurse Practitioner   [place]

## 2021-03-18 NOTE — PROGRESS NOTES
Hospitalist Progress Note    NAME: Josef Garnett   :  1945   MRN:  [id number]     I reviewed pertinent labs and imaging, and discussed /agreed on the plan of care with Dr. Brenda Palafox. Assessment / Plan:  Severe Lumbago   Severe Compression Deformity at T12   · CT Spine 3/17             1. Severe compression deformity at T12, which may be subacute or acute, with 5 mm associated retropulsion of bone at the superior endplate and central stenosis at T11-12. 2. Multilevel degenerative change otherwise with central and foraminal stenosis. 3. Left adrenal adenoma or cyst.             4. Generalized osteopenia with a single focal area of low density in the vertebral body of S83, of uncertain if any clinical significance. Correlate with clinical history. Further evaluation would require lumbar spine MRI. · MRI Thoracic Spine            1. Acute or subacute T12 compression deformity with retropulsion of bone toward the thecal sac, effacing the distal spinal cord. 2. Overall mild thoracic kyphosis and multilevel degenerative change with no other compression deformity. · Appreciate Neurosurgery - plan for T10-L2 fusion, decompression of spinal cord at T12 and biopsy of T12 vertebral body at 1200 tomorrow   · NPO after MD   · Continue scheduled baclofen, lidocaine patch, PRN cyclobenzaprine, PRN dilaudid for pain and muscle spasms    · Will add stool softener to prevent constipation with narcotics   · PT/OT evaluations after surgery   · Continue IVF while NPO     Hypertension   · Does not appear to take any anti-hypertensives  · So far BP controlled   · Monitor     Type II Diabetes  · History of DM but not on any coverage OP   · Check HgbA1c tomorrow AM     S/p ORIF of Left Humerus Fracture x 5 years    New Limited ROM to Left Arm s/p Fall 2 weeks Ago   · XR Humerus/Forearm - Surgical fixation of left humerus fracture   · CXR - Left shoulder dislocation.  Right clavicle and upper rib fractures, age indeterminate. · Appreciate Ortho Input - Plan for closed reduction of should dislocation  · Follow up with [facility]   · Continue wrist brace   · PT/OT evaluations   · Patient reports some swelling to arm - dopplers NEGATIVE for DVT     18.5 - 24.9 Normal weight / Body mass index is 23.49 kg/m². Code status: Full  Prophylaxis: SCD's  Recommended Disposition: HH vs SNF     Subjective:     Chief Complaint / Reason for Physician Visit  Patient seen at bedside, concerned about back spasms and pain. Stated dilaudid did help with pain control. Neurosurgery NP at bedside as well. Patient stated she is going for surgery tomorrow. Discussed with RN events overnight. Review of Systems:  Symptom Y/N Comments  Symptom Y/N Comments   Fever/Chills n   Chest Pain n    Poor Appetite n   Edema n    Cough n   Abdominal Pain n    Sputum n   Joint Pain n    SOB/COULTER n   Pruritis/Rash n    Nausea/vomit n   Tolerating PT/OT     Diarrhea n   Tolerating Diet     Constipation n   Other y Back pain      Could NOT obtain due to:      Objective:     VITALS:   Last 24hrs VS reviewed since prior progress note. Most recent are:  Patient Vitals for the past 24 hrs:   Temp Pulse Resp BP SpO2   03/18/21 1058 98.1 °F (36.7 °C) 89 17 (!) 130/59 99 %   03/18/21 0740 98 °F (36.7 °C) 99 16 (!) 151/89 97 %   03/18/21 0352 97.9 °F (36.6 °C) 82 18 (!) 117/56 96 %   03/17/21 2032 98.5 °F (36.9 °C) 100 18 (!) 147/67 95 %     No intake or output data in the 24 hours ending 03/18/21 1536     I had a face to face encounter and independently examined this patient on 3/18/2021, as outlined below:  PHYSICAL EXAM:  General: WD, WN. Alert, cooperative, elderly female in acute distress r/t back pain and spasms   EENT:  EOMI. Anicteric sclerae. MMM  Resp:  CTA bilaterally, no wheezing or rales. No accessory muscle use  CV:  Regular  rhythm,  No edema  GI:  Soft, Non distended, Non tender.   +Bowel sounds  Neurologic:  Alert and oriented X 3, normal speech,   Psych:   Good insight. Not anxious nor agitated  Skin:  No rashes. No jaundice    Reviewed most current lab test results and cultures  YES  Reviewed most current radiology test results   YES  Review and summation of old records today    NO  Reviewed patient's current orders and MAR    YES  PMH/SH reviewed - no change compared to H&P  ________________________________________________________________________  Care Plan discussed with:    Comments   Patient x    Family      RN x    Care Manager x    Consultant  x Neurosurgery NP                     Multidiciplinary team rounds were held today with , nursing, pharmacist and clinical coordinator. Patient's plan of care was discussed; medications were reviewed and discharge planning was addressed. ________________________________________________________________________  Total NON critical care TIME:  25   Minutes    Total CRITICAL CARE TIME Spent:   Minutes non procedure based      Comments   >50% of visit spent in counseling and coordination of care x    ________________________________________________________________________  Claudia Light NP     Procedures: see electronic medical records for all procedures/Xrays and details which were not copied into this note but were reviewed prior to creation of Plan. LABS:  I reviewed today's most current labs and imaging studies.   Pertinent labs include:  Recent Labs     03/18/21  0342   WBC 5.3   HGB 10.5*   HCT 33.1*   *     Recent Labs     03/18/21  1434 03/18/21  0342   NA  --  136   K  --  4.0   CL  --  104   CO2  --  27   GLU  --  89   BUN  --  13   CREA  --  0.59   CA  --  9.7   INR 1.0  --        Signed: Claudia Light, NP

## 2021-03-18 NOTE — PROGRESS NOTES
Physical therapy:    Orders received and chart reviewed. Noted patient is scheduled for T10-L2 fusion and decompression tomorrow. Will hold therapy until post-operatively.  Thank you    Arn Doing, PT, DPT

## 2021-03-18 NOTE — PROGRESS NOTES
End of Shift Note    Bedside shift change report given to [place], RN (oncoming nurse) by Edelmira Nino (offgoing nurse). Report included the following information SBAR, Kardex, Intake/Output, MAR, Accordion and Recent Results    Shift worked:  night     Shift summary and any significant changes:     patient still complaining of pain after 4mg IV morphine and 10 mg PO flexeril      Concerns for physician to address:  pain     Zone phone for oncoming shift:   [phone]       Activity:  Activity Level: Up with Assistance  Number times ambulated in hallways past shift: 0  Number of times OOB to chair past shift: 0    Cardiac:   Cardiac Monitoring: No           Access:   Current line(s): PIV     Genitourinary:   Urinary status: voiding    Respiratory:   O2 Device: Room air  Chronic home O2 use?: NO  Incentive spirometer at bedside: NO     GI:     Current diet:  DIET NPO  Passing flatus: YES  Tolerating current diet: YES       Pain Management:   Patient states pain is manageable on current regimen: NO    Skin:  [place] Score: 17  Interventions: float heels and increase time out of bed    Patient Safety:  Fall Score:  Total Score: 4  Interventions: assistive device (walker, cane, etc), gripper socks and pt to call before getting OOB  High Fall Risk: Yes    Length of Stay:  Expected LOS: - - -  Actual LOS: 1      Edelmira Nino

## 2021-03-19 ENCOUNTER — ANESTHESIA (OUTPATIENT)
Dept: SURGERY | Age: 76
DRG: 457 | End: 2021-03-19
Payer: MEDICARE

## 2021-03-19 ENCOUNTER — APPOINTMENT (OUTPATIENT)
Dept: GENERAL RADIOLOGY | Age: 76
DRG: 457 | End: 2021-03-19
Attending: NEUROLOGICAL SURGERY
Payer: MEDICARE

## 2021-03-19 LAB
ATRIAL RATE: 90 BPM
CALCULATED P AXIS, ECG09: 66 DEGREES
CALCULATED R AXIS, ECG10: -10 DEGREES
CALCULATED T AXIS, ECG11: 44 DEGREES
DIAGNOSIS, 93000: NORMAL
EST. AVERAGE GLUCOSE BLD GHB EST-MCNC: 105 MG/DL
GLUCOSE BLD STRIP.AUTO-MCNC: 102 MG/DL (ref 65–100)
GLUCOSE BLD STRIP.AUTO-MCNC: 118 MG/DL (ref 65–100)
GLUCOSE BLD STRIP.AUTO-MCNC: 190 MG/DL (ref 65–100)
GLUCOSE BLD STRIP.AUTO-MCNC: 82 MG/DL (ref 65–100)
HBA1C MFR BLD: 5.3 % (ref 4–5.6)
P-R INTERVAL, ECG05: 138 MS
Q-T INTERVAL, ECG07: 356 MS
QRS DURATION, ECG06: 82 MS
QTC CALCULATION (BEZET), ECG08: 435 MS
SERVICE CMNT-IMP: ABNORMAL
SERVICE CMNT-IMP: NORMAL
VENTRICULAR RATE, ECG03: 90 BPM

## 2021-03-19 PROCEDURE — 77030020268 HC MISC GENERAL SUPPLY: Performed by: NEUROLOGICAL SURGERY

## 2021-03-19 PROCEDURE — 82962 GLUCOSE BLOOD TEST: CPT

## 2021-03-19 PROCEDURE — C1713 ANCHOR/SCREW BN/BN,TIS/BN: HCPCS | Performed by: NEUROLOGICAL SURGERY

## 2021-03-19 PROCEDURE — 74011250636 HC RX REV CODE- 250/636: Performed by: NURSE ANESTHETIST, CERTIFIED REGISTERED

## 2021-03-19 PROCEDURE — 77030013079 HC BLNKT BAIR HGGR 3M -A: Performed by: ANESTHESIOLOGY

## 2021-03-19 PROCEDURE — 74011250636 HC RX REV CODE- 250/636: Performed by: ANESTHESIOLOGY

## 2021-03-19 PROCEDURE — 77030003029 HC SUT VCRL J&J -B: Performed by: NEUROLOGICAL SURGERY

## 2021-03-19 PROCEDURE — 76010000175 HC OR TIME 4 TO 4.5 HR INTENSV-TIER 1: Performed by: NEUROLOGICAL SURGERY

## 2021-03-19 PROCEDURE — 77030034479 HC ADH SKN CLSR PRINEO J&J -B: Performed by: NEUROLOGICAL SURGERY

## 2021-03-19 PROCEDURE — 72100 X-RAY EXAM L-S SPINE 2/3 VWS: CPT

## 2021-03-19 PROCEDURE — 74011250636 HC RX REV CODE- 250/636: Performed by: NURSE PRACTITIONER

## 2021-03-19 PROCEDURE — 2709999900 HC NON-CHARGEABLE SUPPLY

## 2021-03-19 PROCEDURE — 74011000250 HC RX REV CODE- 250: Performed by: NEUROLOGICAL SURGERY

## 2021-03-19 PROCEDURE — 74011000250 HC RX REV CODE- 250

## 2021-03-19 PROCEDURE — 2709999900 HC NON-CHARGEABLE SUPPLY: Performed by: NEUROLOGICAL SURGERY

## 2021-03-19 PROCEDURE — 0RG7071 FUSION OF 2 TO 7 THORACIC VERTEBRAL JOINTS WITH AUTOLOGOUS TISSUE SUBSTITUTE, POSTERIOR APPROACH, POSTERIOR COLUMN, OPEN APPROACH: ICD-10-PCS | Performed by: NEUROLOGICAL SURGERY

## 2021-03-19 PROCEDURE — 00NX0ZZ RELEASE THORACIC SPINAL CORD, OPEN APPROACH: ICD-10-PCS | Performed by: NEUROLOGICAL SURGERY

## 2021-03-19 PROCEDURE — 77030002933 HC SUT MCRYL J&J -A: Performed by: NEUROLOGICAL SURGERY

## 2021-03-19 PROCEDURE — 77030026438 HC STYL ET INTUB CARD -A

## 2021-03-19 PROCEDURE — 76210000016 HC OR PH I REC 1 TO 1.5 HR: Performed by: NEUROLOGICAL SURGERY

## 2021-03-19 PROCEDURE — 77030029099 HC BN WAX SSPC -A: Performed by: NEUROLOGICAL SURGERY

## 2021-03-19 PROCEDURE — 74011000250 HC RX REV CODE- 250: Performed by: NURSE ANESTHETIST, CERTIFIED REGISTERED

## 2021-03-19 PROCEDURE — 93005 ELECTROCARDIOGRAM TRACING: CPT

## 2021-03-19 PROCEDURE — 77030011266 HC ELECTRD BLD INSL COVD -A: Performed by: NEUROLOGICAL SURGERY

## 2021-03-19 PROCEDURE — 77030003028 HC SUT VCRL J&J -A: Performed by: NEUROLOGICAL SURGERY

## 2021-03-19 PROCEDURE — 83036 HEMOGLOBIN GLYCOSYLATED A1C: CPT

## 2021-03-19 PROCEDURE — 77030008684 HC TU ET CUF COVD -B

## 2021-03-19 PROCEDURE — 77030026359 HC KT XPNDR II KYPH -I2: Performed by: NEUROLOGICAL SURGERY

## 2021-03-19 PROCEDURE — 77030022302 HC GRFT BN SPN SC OSTEO -H1: Performed by: NEUROLOGICAL SURGERY

## 2021-03-19 PROCEDURE — 74011250637 HC RX REV CODE- 250/637: Performed by: NURSE PRACTITIONER

## 2021-03-19 PROCEDURE — 76000 FLUOROSCOPY <1 HR PHYS/QHP: CPT

## 2021-03-19 PROCEDURE — 77030037318 HC KT SPN BN TAMP AF KYPH -H: Performed by: NEUROLOGICAL SURGERY

## 2021-03-19 PROCEDURE — 77030014647 HC SEAL FBRN TISSL BAXT -D: Performed by: NEUROLOGICAL SURGERY

## 2021-03-19 PROCEDURE — 77030012406 HC DRN WND PENRS BARD -A: Performed by: NEUROLOGICAL SURGERY

## 2021-03-19 PROCEDURE — 77030020061 HC IV BLD WRMR ADMIN SET 3M -B: Performed by: ANESTHESIOLOGY

## 2021-03-19 PROCEDURE — 88307 TISSUE EXAM BY PATHOLOGIST: CPT

## 2021-03-19 PROCEDURE — 88311 DECALCIFY TISSUE: CPT

## 2021-03-19 PROCEDURE — 76060000039 HC ANESTHESIA 4 TO 4.5 HR: Performed by: NEUROLOGICAL SURGERY

## 2021-03-19 PROCEDURE — 77030019908 HC STETH ESOPH SIMS -A

## 2021-03-19 PROCEDURE — 77030021668 HC NEB PREFIL KT VYRM -A

## 2021-03-19 PROCEDURE — 0RGA071 FUSION OF THORACOLUMBAR VERTEBRAL JOINT WITH AUTOLOGOUS TISSUE SUBSTITUTE, POSTERIOR APPROACH, POSTERIOR COLUMN, OPEN APPROACH: ICD-10-PCS | Performed by: NEUROLOGICAL SURGERY

## 2021-03-19 PROCEDURE — 65270000029 HC RM PRIVATE

## 2021-03-19 PROCEDURE — 77030002916 HC SUT ETHLN J&J -A: Performed by: NEUROLOGICAL SURGERY

## 2021-03-19 PROCEDURE — 77030031139 HC SUT VCRL2 J&J -A: Performed by: NEUROLOGICAL SURGERY

## 2021-03-19 PROCEDURE — 74011250636 HC RX REV CODE- 250/636: Performed by: NEUROLOGICAL SURGERY

## 2021-03-19 PROCEDURE — 77030005513 HC CATH URETH FOL11 MDII -B: Performed by: NEUROLOGICAL SURGERY

## 2021-03-19 PROCEDURE — 0PB40ZX EXCISION OF THORACIC VERTEBRA, OPEN APPROACH, DIAGNOSTIC: ICD-10-PCS | Performed by: NEUROLOGICAL SURGERY

## 2021-03-19 PROCEDURE — 77030004391 HC BUR FLUT MEDT -C: Performed by: NEUROLOGICAL SURGERY

## 2021-03-19 PROCEDURE — 77030040361 HC SLV COMPR DVT MDII -B: Performed by: NEUROLOGICAL SURGERY

## 2021-03-19 PROCEDURE — 36415 COLL VENOUS BLD VENIPUNCTURE: CPT

## 2021-03-19 PROCEDURE — 74011250637 HC RX REV CODE- 250/637: Performed by: NEUROLOGICAL SURGERY

## 2021-03-19 PROCEDURE — 77030038692 HC WND DEB SYS IRMX -B: Performed by: NEUROLOGICAL SURGERY

## 2021-03-19 PROCEDURE — 74011250636 HC RX REV CODE- 250/636

## 2021-03-19 DEVICE — ROD 1553200500 5.5 TICP4 NS STR LN 500MM
Type: IMPLANTABLE DEVICE | Site: SPINE THORACIC | Status: FUNCTIONAL
Brand: CD HORIZON® SPINAL SYSTEM

## 2021-03-19 DEVICE — MAS 55840026555T 5.5/6.0 FENSTRTD 6.5X55
Type: IMPLANTABLE DEVICE | Site: SPINE THORACIC | Status: FUNCTIONAL
Brand: CD HORIZON® FENESTRATED SCREW SET

## 2021-03-19 DEVICE — BONE CEMENT CX01A XPEDE US
Type: IMPLANTABLE DEVICE | Site: SPINE THORACIC | Status: FUNCTIONAL
Brand: KYPHON® XPEDE™ BONE CEMENT

## 2021-03-19 DEVICE — DBM 7509211 MAGNIFUSE 1 X 10CM
Type: IMPLANTABLE DEVICE | Site: SPINE THORACIC | Status: FUNCTIONAL
Brand: MAGNIFUSE® BONE GRAFT

## 2021-03-19 RX ORDER — SODIUM CHLORIDE 9 MG/ML
INJECTION, SOLUTION INTRAVENOUS
Status: DISCONTINUED | OUTPATIENT
Start: 2021-03-19 | End: 2021-03-19 | Stop reason: HOSPADM

## 2021-03-19 RX ORDER — BUPIVACAINE HYDROCHLORIDE AND EPINEPHRINE 5; 5 MG/ML; UG/ML
INJECTION, SOLUTION EPIDURAL; INTRACAUDAL; PERINEURAL AS NEEDED
Status: DISCONTINUED | OUTPATIENT
Start: 2021-03-19 | End: 2021-03-19 | Stop reason: HOSPADM

## 2021-03-19 RX ORDER — SUCCINYLCHOLINE CHLORIDE 20 MG/ML
INJECTION INTRAMUSCULAR; INTRAVENOUS AS NEEDED
Status: DISCONTINUED | OUTPATIENT
Start: 2021-03-19 | End: 2021-03-19 | Stop reason: HOSPADM

## 2021-03-19 RX ORDER — FENTANYL CITRATE 50 UG/ML
INJECTION, SOLUTION INTRAMUSCULAR; INTRAVENOUS AS NEEDED
Status: DISCONTINUED | OUTPATIENT
Start: 2021-03-19 | End: 2021-03-19 | Stop reason: HOSPADM

## 2021-03-19 RX ORDER — SODIUM CHLORIDE 0.9 % (FLUSH) 0.9 %
5-40 SYRINGE (ML) INJECTION AS NEEDED
Status: DISCONTINUED | OUTPATIENT
Start: 2021-03-19 | End: 2021-03-19 | Stop reason: HOSPADM

## 2021-03-19 RX ORDER — FENTANYL CITRATE 50 UG/ML
25 INJECTION, SOLUTION INTRAMUSCULAR; INTRAVENOUS
Status: DISCONTINUED | OUTPATIENT
Start: 2021-03-19 | End: 2021-03-19 | Stop reason: HOSPADM

## 2021-03-19 RX ORDER — DIPHENHYDRAMINE HCL 25 MG
25 CAPSULE ORAL
Status: DISCONTINUED | OUTPATIENT
Start: 2021-03-19 | End: 2021-03-25 | Stop reason: HOSPADM

## 2021-03-19 RX ORDER — DEXAMETHASONE SODIUM PHOSPHATE 4 MG/ML
INJECTION, SOLUTION INTRA-ARTICULAR; INTRALESIONAL; INTRAMUSCULAR; INTRAVENOUS; SOFT TISSUE AS NEEDED
Status: DISCONTINUED | OUTPATIENT
Start: 2021-03-19 | End: 2021-03-19 | Stop reason: HOSPADM

## 2021-03-19 RX ORDER — HYDROMORPHONE HYDROCHLORIDE 2 MG/1
4 TABLET ORAL
Status: DISCONTINUED | OUTPATIENT
Start: 2021-03-19 | End: 2021-03-21

## 2021-03-19 RX ORDER — MORPHINE SULFATE 2 MG/ML
2 INJECTION, SOLUTION INTRAMUSCULAR; INTRAVENOUS
Status: DISPENSED | OUTPATIENT
Start: 2021-03-19 | End: 2021-03-20

## 2021-03-19 RX ORDER — ONDANSETRON 2 MG/ML
4 INJECTION INTRAMUSCULAR; INTRAVENOUS AS NEEDED
Status: DISCONTINUED | OUTPATIENT
Start: 2021-03-19 | End: 2021-03-19 | Stop reason: HOSPADM

## 2021-03-19 RX ORDER — KETAMINE HYDROCHLORIDE 10 MG/ML
INJECTION, SOLUTION INTRAMUSCULAR; INTRAVENOUS AS NEEDED
Status: DISCONTINUED | OUTPATIENT
Start: 2021-03-19 | End: 2021-03-19 | Stop reason: HOSPADM

## 2021-03-19 RX ORDER — DIPHENHYDRAMINE HYDROCHLORIDE 50 MG/ML
12.5 INJECTION, SOLUTION INTRAMUSCULAR; INTRAVENOUS
Status: DISCONTINUED | OUTPATIENT
Start: 2021-03-19 | End: 2021-03-19 | Stop reason: HOSPADM

## 2021-03-19 RX ORDER — NEOSTIGMINE METHYLSULFATE 1 MG/ML
INJECTION, SOLUTION INTRAVENOUS AS NEEDED
Status: DISCONTINUED | OUTPATIENT
Start: 2021-03-19 | End: 2021-03-19 | Stop reason: HOSPADM

## 2021-03-19 RX ORDER — HYDROMORPHONE HYDROCHLORIDE 2 MG/ML
INJECTION, SOLUTION INTRAMUSCULAR; INTRAVENOUS; SUBCUTANEOUS AS NEEDED
Status: DISCONTINUED | OUTPATIENT
Start: 2021-03-19 | End: 2021-03-19 | Stop reason: HOSPADM

## 2021-03-19 RX ORDER — PHENYLEPHRINE HCL IN 0.9% NACL 0.4MG/10ML
SYRINGE (ML) INTRAVENOUS AS NEEDED
Status: DISCONTINUED | OUTPATIENT
Start: 2021-03-19 | End: 2021-03-19 | Stop reason: HOSPADM

## 2021-03-19 RX ORDER — LIDOCAINE HYDROCHLORIDE 10 MG/ML
0.1 INJECTION, SOLUTION EPIDURAL; INFILTRATION; INTRACAUDAL; PERINEURAL AS NEEDED
Status: DISCONTINUED | OUTPATIENT
Start: 2021-03-19 | End: 2021-03-19 | Stop reason: HOSPADM

## 2021-03-19 RX ORDER — FENTANYL CITRATE 50 UG/ML
50 INJECTION, SOLUTION INTRAMUSCULAR; INTRAVENOUS ONCE
Status: COMPLETED | OUTPATIENT
Start: 2021-03-19 | End: 2021-03-19

## 2021-03-19 RX ORDER — GLYCOPYRROLATE 0.2 MG/ML
INJECTION INTRAMUSCULAR; INTRAVENOUS AS NEEDED
Status: DISCONTINUED | OUTPATIENT
Start: 2021-03-19 | End: 2021-03-19 | Stop reason: HOSPADM

## 2021-03-19 RX ORDER — HYDRALAZINE HYDROCHLORIDE 20 MG/ML
10 INJECTION INTRAMUSCULAR; INTRAVENOUS
Status: DISCONTINUED | OUTPATIENT
Start: 2021-03-19 | End: 2021-03-25 | Stop reason: HOSPADM

## 2021-03-19 RX ORDER — HYDRALAZINE HYDROCHLORIDE 20 MG/ML
INJECTION INTRAMUSCULAR; INTRAVENOUS
Status: DISPENSED
Start: 2021-03-19 | End: 2021-03-20

## 2021-03-19 RX ORDER — MORPHINE SULFATE 2 MG/ML
2 INJECTION, SOLUTION INTRAMUSCULAR; INTRAVENOUS
Status: DISCONTINUED | OUTPATIENT
Start: 2021-03-19 | End: 2021-03-19 | Stop reason: HOSPADM

## 2021-03-19 RX ORDER — SODIUM CHLORIDE, SODIUM LACTATE, POTASSIUM CHLORIDE, CALCIUM CHLORIDE 600; 310; 30; 20 MG/100ML; MG/100ML; MG/100ML; MG/100ML
25 INJECTION, SOLUTION INTRAVENOUS CONTINUOUS
Status: DISCONTINUED | OUTPATIENT
Start: 2021-03-19 | End: 2021-03-19 | Stop reason: HOSPADM

## 2021-03-19 RX ORDER — SODIUM CHLORIDE 0.9 % (FLUSH) 0.9 %
5-40 SYRINGE (ML) INJECTION EVERY 8 HOURS
Status: DISCONTINUED | OUTPATIENT
Start: 2021-03-19 | End: 2021-03-25 | Stop reason: HOSPADM

## 2021-03-19 RX ORDER — MIDAZOLAM HYDROCHLORIDE 1 MG/ML
2 INJECTION, SOLUTION INTRAMUSCULAR; INTRAVENOUS ONCE
Status: COMPLETED | OUTPATIENT
Start: 2021-03-19 | End: 2021-03-19

## 2021-03-19 RX ORDER — ONDANSETRON 2 MG/ML
INJECTION INTRAMUSCULAR; INTRAVENOUS AS NEEDED
Status: DISCONTINUED | OUTPATIENT
Start: 2021-03-19 | End: 2021-03-19 | Stop reason: HOSPADM

## 2021-03-19 RX ORDER — SODIUM CHLORIDE 0.9 % (FLUSH) 0.9 %
5-40 SYRINGE (ML) INJECTION AS NEEDED
Status: DISCONTINUED | OUTPATIENT
Start: 2021-03-19 | End: 2021-03-25 | Stop reason: HOSPADM

## 2021-03-19 RX ORDER — MIDAZOLAM HYDROCHLORIDE 1 MG/ML
INJECTION, SOLUTION INTRAMUSCULAR; INTRAVENOUS AS NEEDED
Status: DISCONTINUED | OUTPATIENT
Start: 2021-03-19 | End: 2021-03-19 | Stop reason: HOSPADM

## 2021-03-19 RX ORDER — SODIUM CHLORIDE 0.9 % (FLUSH) 0.9 %
5-40 SYRINGE (ML) INJECTION EVERY 8 HOURS
Status: DISCONTINUED | OUTPATIENT
Start: 2021-03-19 | End: 2021-03-19 | Stop reason: HOSPADM

## 2021-03-19 RX ORDER — ROCURONIUM BROMIDE 10 MG/ML
INJECTION, SOLUTION INTRAVENOUS AS NEEDED
Status: DISCONTINUED | OUTPATIENT
Start: 2021-03-19 | End: 2021-03-19 | Stop reason: HOSPADM

## 2021-03-19 RX ORDER — PROPOFOL 10 MG/ML
INJECTION, EMULSION INTRAVENOUS AS NEEDED
Status: DISCONTINUED | OUTPATIENT
Start: 2021-03-19 | End: 2021-03-19 | Stop reason: HOSPADM

## 2021-03-19 RX ORDER — LIDOCAINE HYDROCHLORIDE 20 MG/ML
INJECTION, SOLUTION EPIDURAL; INFILTRATION; INTRACAUDAL; PERINEURAL AS NEEDED
Status: DISCONTINUED | OUTPATIENT
Start: 2021-03-19 | End: 2021-03-19 | Stop reason: HOSPADM

## 2021-03-19 RX ORDER — MORPHINE SULFATE 2 MG/ML
1 INJECTION, SOLUTION INTRAMUSCULAR; INTRAVENOUS ONCE
Status: COMPLETED | OUTPATIENT
Start: 2021-03-19 | End: 2021-03-19

## 2021-03-19 RX ORDER — NALOXONE HYDROCHLORIDE 0.4 MG/ML
0.4 INJECTION, SOLUTION INTRAMUSCULAR; INTRAVENOUS; SUBCUTANEOUS AS NEEDED
Status: DISCONTINUED | OUTPATIENT
Start: 2021-03-19 | End: 2021-03-25 | Stop reason: HOSPADM

## 2021-03-19 RX ORDER — HYDROMORPHONE HYDROCHLORIDE 2 MG/1
2 TABLET ORAL
Status: DISCONTINUED | OUTPATIENT
Start: 2021-03-19 | End: 2021-03-21

## 2021-03-19 RX ADMIN — MIDAZOLAM 1 MG: 1 INJECTION INTRAMUSCULAR; INTRAVENOUS at 13:06

## 2021-03-19 RX ADMIN — LIDOCAINE HYDROCHLORIDE 60 MG: 20 INJECTION, SOLUTION EPIDURAL; INFILTRATION; INTRACAUDAL; PERINEURAL at 13:06

## 2021-03-19 RX ADMIN — ONDANSETRON HYDROCHLORIDE 4 MG: 2 INJECTION, SOLUTION INTRAMUSCULAR; INTRAVENOUS at 16:35

## 2021-03-19 RX ADMIN — Medication 10 ML: at 21:33

## 2021-03-19 RX ADMIN — HYDRALAZINE HYDROCHLORIDE 10 MG: 20 INJECTION INTRAMUSCULAR; INTRAVENOUS at 17:47

## 2021-03-19 RX ADMIN — HYDROMORPHONE HYDROCHLORIDE 0.5 MG: 2 INJECTION, SOLUTION INTRAMUSCULAR; INTRAVENOUS; SUBCUTANEOUS at 17:08

## 2021-03-19 RX ADMIN — GLYCOPYRROLATE 0.4 MG: 0.2 INJECTION, SOLUTION INTRAMUSCULAR; INTRAVENOUS at 16:35

## 2021-03-19 RX ADMIN — PROPOFOL 150 MG: 10 INJECTION, EMULSION INTRAVENOUS at 13:06

## 2021-03-19 RX ADMIN — VANCOMYCIN HYDROCHLORIDE 1000 MG: 1 INJECTION, POWDER, LYOPHILIZED, FOR SOLUTION INTRAVENOUS at 11:32

## 2021-03-19 RX ADMIN — SUCCINYLCHOLINE CHLORIDE 140 MG: 20 INJECTION, SOLUTION INTRAMUSCULAR; INTRAVENOUS at 13:06

## 2021-03-19 RX ADMIN — Medication 3 AMPULE: at 11:32

## 2021-03-19 RX ADMIN — KETAMINE HYDROCHLORIDE 20 MG: 10 INJECTION, SOLUTION INTRAMUSCULAR; INTRAVENOUS at 13:35

## 2021-03-19 RX ADMIN — HYDROMORPHONE HYDROCHLORIDE 2 MG: 1 INJECTION, SOLUTION INTRAMUSCULAR; INTRAVENOUS; SUBCUTANEOUS at 01:40

## 2021-03-19 RX ADMIN — HYDROMORPHONE HYDROCHLORIDE 0.5 MG: 2 INJECTION, SOLUTION INTRAMUSCULAR; INTRAVENOUS; SUBCUTANEOUS at 14:46

## 2021-03-19 RX ADMIN — MIDAZOLAM 2 MG: 1 INJECTION INTRAMUSCULAR; INTRAVENOUS at 12:40

## 2021-03-19 RX ADMIN — HYDROMORPHONE HYDROCHLORIDE 2 MG: 1 INJECTION, SOLUTION INTRAMUSCULAR; INTRAVENOUS; SUBCUTANEOUS at 07:37

## 2021-03-19 RX ADMIN — FENTANYL CITRATE 25 MCG: 50 INJECTION INTRAMUSCULAR; INTRAVENOUS at 17:08

## 2021-03-19 RX ADMIN — FENTANYL CITRATE 25 MCG: 50 INJECTION INTRAMUSCULAR; INTRAVENOUS at 12:00

## 2021-03-19 RX ADMIN — MEPERIDINE HYDROCHLORIDE 12.5 MG: 25 INJECTION, SOLUTION INTRAMUSCULAR; INTRAVENOUS; SUBCUTANEOUS at 20:44

## 2021-03-19 RX ADMIN — FENTANYL CITRATE 50 MCG: 50 INJECTION INTRAMUSCULAR; INTRAVENOUS at 12:30

## 2021-03-19 RX ADMIN — FENTANYL CITRATE 50 MCG: 50 INJECTION INTRAMUSCULAR; INTRAVENOUS at 13:59

## 2021-03-19 RX ADMIN — KETAMINE HYDROCHLORIDE 10 MG: 10 INJECTION, SOLUTION INTRAMUSCULAR; INTRAVENOUS at 14:35

## 2021-03-19 RX ADMIN — MIDAZOLAM 1 MG: 1 INJECTION INTRAMUSCULAR; INTRAVENOUS at 12:00

## 2021-03-19 RX ADMIN — ROCURONIUM BROMIDE 30 MG: 10 INJECTION INTRAVENOUS at 13:20

## 2021-03-19 RX ADMIN — ROCURONIUM BROMIDE 10 MG: 10 INJECTION INTRAVENOUS at 15:52

## 2021-03-19 RX ADMIN — HYDROMORPHONE HYDROCHLORIDE 0.5 MG: 2 INJECTION, SOLUTION INTRAMUSCULAR; INTRAVENOUS; SUBCUTANEOUS at 16:38

## 2021-03-19 RX ADMIN — ROCURONIUM BROMIDE 20 MG: 10 INJECTION INTRAVENOUS at 13:59

## 2021-03-19 RX ADMIN — CYCLOBENZAPRINE 10 MG: 10 TABLET, FILM COATED ORAL at 02:50

## 2021-03-19 RX ADMIN — MORPHINE SULFATE 1 MG: 2 INJECTION, SOLUTION INTRAMUSCULAR; INTRAVENOUS at 21:32

## 2021-03-19 RX ADMIN — FENTANYL CITRATE 25 MCG: 50 INJECTION, SOLUTION INTRAMUSCULAR; INTRAVENOUS at 11:25

## 2021-03-19 RX ADMIN — Medication 80 MCG: at 13:12

## 2021-03-19 RX ADMIN — SODIUM CHLORIDE, POTASSIUM CHLORIDE, SODIUM LACTATE AND CALCIUM CHLORIDE 25 ML/HR: 600; 310; 30; 20 INJECTION, SOLUTION INTRAVENOUS at 11:32

## 2021-03-19 RX ADMIN — MIDAZOLAM HYDROCHLORIDE 1 MG: 1 INJECTION, SOLUTION INTRAMUSCULAR; INTRAVENOUS at 11:25

## 2021-03-19 RX ADMIN — DEXAMETHASONE SODIUM PHOSPHATE 12 MG: 4 INJECTION, SOLUTION INTRAMUSCULAR; INTRAVENOUS at 13:35

## 2021-03-19 RX ADMIN — Medication 3 MG: at 16:35

## 2021-03-19 RX ADMIN — HYDRALAZINE HYDROCHLORIDE 10 MG: 20 INJECTION INTRAMUSCULAR; INTRAVENOUS at 20:11

## 2021-03-19 RX ADMIN — MORPHINE SULFATE 2 MG: 2 INJECTION, SOLUTION INTRAMUSCULAR; INTRAVENOUS at 23:58

## 2021-03-19 RX ADMIN — SODIUM CHLORIDE 250 ML: 9 INJECTION, SOLUTION INTRAVENOUS at 21:26

## 2021-03-19 RX ADMIN — Medication 40 MCG: at 13:08

## 2021-03-19 RX ADMIN — VANCOMYCIN HYDROCHLORIDE 1000 MG: 1 INJECTION, POWDER, LYOPHILIZED, FOR SOLUTION INTRAVENOUS at 23:14

## 2021-03-19 RX ADMIN — SODIUM CHLORIDE 40 MCG/MIN: 900 INJECTION, SOLUTION INTRAVENOUS at 13:44

## 2021-03-19 RX ADMIN — Medication 40 MCG: at 13:15

## 2021-03-19 RX ADMIN — MEPERIDINE HYDROCHLORIDE 12.5 MG: 25 INJECTION, SOLUTION INTRAMUSCULAR; INTRAVENOUS; SUBCUTANEOUS at 21:14

## 2021-03-19 RX ADMIN — MIDAZOLAM 1 MG: 1 INJECTION INTRAMUSCULAR; INTRAVENOUS at 11:45

## 2021-03-19 RX ADMIN — FENTANYL CITRATE 25 MCG: 50 INJECTION INTRAMUSCULAR; INTRAVENOUS at 14:05

## 2021-03-19 RX ADMIN — FENTANYL CITRATE 25 MCG: 50 INJECTION INTRAMUSCULAR; INTRAVENOUS at 13:18

## 2021-03-19 RX ADMIN — FENTANYL CITRATE 25 MCG: 50 INJECTION INTRAMUSCULAR; INTRAVENOUS at 11:45

## 2021-03-19 RX ADMIN — SODIUM CHLORIDE: 900 INJECTION, SOLUTION INTRAVENOUS at 13:10

## 2021-03-19 NOTE — PROGRESS NOTES
TRANSFER - IN REPORT:    Verbal report received from [facility], RN(name) on Geovanna Mckeon  being received from [facility] (unit) for ordered procedure      Report consisted of patients Situation, Background, Assessment and   Recommendations(SBAR). Information from the following report(s) SBAR, MAR and Recent Results was reviewed with the receiving nurse. Opportunity for questions and clarification was provided. Assessment completed upon patients arrival to unit and care assumed.

## 2021-03-19 NOTE — PROGRESS NOTES
Bedside and Verbal shift change report given to Jelena Frazier RN (oncoming nurse) by KAREN Montemayor (offgoing nurse). Report included the following information SBAR, Kardex, Intake/Output, MAR, Accordion, Recent Results and Med Rec Status.

## 2021-03-19 NOTE — PERIOP NOTES
Handoff Report from Operating Room to PACU  5:17 PM  Report received from SERINA Galvez RN and Jones Slater CRNA regarding Kelli Enamorado. Surgeon(s):  Ron Gracia MD  And Procedure(s) (LRB):  T10- L2  PEDICLE SCREW MADISON FUSION WITH KYPHON CEMENT, DECOMPRESSION OF SPINAL CORD AT THORACIC TWELVE, THORACIC TWELVE LEFT AND RIGHT BONE BIOPSIES (N/A)  confirmed   with allergies, drains and dressings discussed. Anesthesia type, drugs, patient history, complications, estimated blood loss, vital signs, intake and output, and last pain medication, lines, reversal medications and temperature were reviewed. 6:38 PM  TRANSFER - OUT REPORT:    Verbal report given to Joyce Christianson (name) on Kelli Enamorado  being transferred to Ortho (unit) for routine progression of care       Report consisted of patients Situation, Background, Assessment and   Recommendations(SBAR). Information from the following report(s) SBAR, Kardex, MAR and Accordion was reviewed with the receiving nurse. Lines:   Peripheral IV 03/17/21 Left;Posterior Wrist (Active)   Site Assessment Clean, dry, & intact 03/18/21 2000   Phlebitis Assessment 0 03/18/21 2000   Infiltration Assessment 0 03/18/21 2000   Dressing Status Clean, dry, & intact 03/18/21 2000   Dressing Type Transparent;Tape 03/18/21 2000   Hub Color/Line Status Pink 03/18/21 2000       Peripheral IV 03/19/21 Left External jugular (Active)       Peripheral IV 03/19/21 Right Hand (Active)        Opportunity for questions and clarification was provided.       Patient transported with:   O2 @ 2 liters  Registered Nurse  [facility]

## 2021-03-19 NOTE — PROGRESS NOTES
Physical Therapy  Patient scheduled for T10-L2 fusion today. Will follow up post surgery for evaluation.   Amrita Hampton, PT, DPT

## 2021-03-19 NOTE — PROGRESS NOTES
Hospitalist Progress Note    NAME: Valerie Phillips   :  1945   MRN:  [id number]     I reviewed pertinent labs and imaging, and discussed /agreed on the plan of care with Dr. Melvin Moore. Assessment / Plan:  Severe Lumbago   Severe Compression Deformity at T12   · CT Spine 3/17             1. Severe compression deformity at T12, which may be subacute or acute, with 5 mm associated retropulsion of bone at the superior endplate and central stenosis at T11-12. 2. Multilevel degenerative change otherwise with central and foraminal stenosis. 3. Left adrenal adenoma or cyst.             4. Generalized osteopenia with a single focal area of low density in the vertebral body of E70, of uncertain if any clinical significance. Correlate with clinical history. Further evaluation would require lumbar spine MRI. · MRI Thoracic Spine            1. Acute or subacute T12 compression deformity with retropulsion of bone toward the thecal sac, effacing the distal spinal cord. 2. Overall mild thoracic kyphosis and multilevel degenerative change with no other compression deformity. · Appreciate Neurosurgery - plan for T10-L2 fusion, decompression of spinal cord at T12 and biopsy of T12 vertebral body at 1200 today  · Continue scheduled baclofen, lidocaine patch, PRN cyclobenzaprine, PRN dilaudid for pain and muscle spasms    · Continue stool softeners  · PT/OT evaluations after surgery   · Continue IVF while NPO     Hypertension   · Does not appear to take any anti-hypertensives  · So far BP controlled   · Monitor     Type II Diabetes  · History of DM but not on any coverage OP   · Check HgbA1c tomorrow AM     S/p ORIF of Left Humerus Fracture x 5 years    New Limited ROM to Left Arm s/p Fall 2 weeks Ago   · XR Humerus/Forearm - Surgical fixation of left humerus fracture   · CXR - Left shoulder dislocation. Right clavicle and upper rib fractures, age indeterminate.    · Appreciate Ortho Input - S/p closed reduction of should dislocation  · Follow up with [facility]   · Continue wrist brace   · PT/OT evaluations   · Patient reports some swelling to arm - dopplers NEGATIVE for DVT     18.5 - 24.9 Normal weight / Body mass index is 23.49 kg/m². Code status: Full  Prophylaxis: SCD's  Recommended Disposition: HH vs SNF     Subjective:     Chief Complaint / Reason for Physician Visit  Patient seen at bedside, concerned about back spasms and pain. Still in pain with back spasms, for surgery today. Discussed with RN events overnight. Review of Systems:  Symptom Y/N Comments  Symptom Y/N Comments   Fever/Chills n   Chest Pain n    Poor Appetite n   Edema n    Cough n   Abdominal Pain n    Sputum n   Joint Pain n    SOB/COULTER n   Pruritis/Rash n    Nausea/vomit n   Tolerating PT/OT     Diarrhea n   Tolerating Diet     Constipation n   Other y Back pain      Could NOT obtain due to:      Objective:     VITALS:   Last 24hrs VS reviewed since prior progress note. Most recent are:  Patient Vitals for the past 24 hrs:   Temp Pulse Resp BP SpO2   03/19/21 1230 -- (!) (P) 108 (P) 19 (!) (P) 144/70 (P) 100 %   03/19/21 1215 -- (!) (P) 105 (P) 17 (!) (P) 174/96 (P) 100 %   03/19/21 1158 -- (!) 111 22 (!) 148/64 100 %   03/19/21 1130 -- (!) 116 -- (!) 142/79 --   03/19/21 1115 -- -- 20 (!) 146/95 99 %   03/19/21 1057 98.5 °F (36.9 °C) -- 16 (!) 147/71 100 %   03/19/21 0750 97.9 °F (36.6 °C) 96 16 138/70 97 %   03/19/21 0404 98.2 °F (36.8 °C) 95 16 132/62 99 %   03/18/21 2023 97.3 °F (36.3 °C) 97 18 (!) 166/93 100 %   03/18/21 1551 97.8 °F (36.6 °C) 100 18 134/63 92 %       Intake/Output Summary (Last 24 hours) at 3/19/2021 1452  Last data filed at 3/19/2021 0546  Gross per 24 hour   Intake --   Output 200 ml   Net -200 ml        I had a face to face encounter and independently examined this patient on 3/19/2021, as outlined below:  PHYSICAL EXAM:  General: WD, WN.  Alert, cooperative, elderly female in acute distress r/t back pain and spasms   EENT:  EOMI. Anicteric sclerae. MMM  Resp:  CTA bilaterally, no wheezing or rales. No accessory muscle use  CV:  Regular  rhythm,  No edema  GI:  Soft, Non distended, Non tender. +Bowel sounds  Neurologic:  Alert and oriented X 3, normal speech,   Psych:   Good insight. Not anxious nor agitated  Skin:  No rashes. No jaundice    Reviewed most current lab test results and cultures  YES  Reviewed most current radiology test results   YES  Review and summation of old records today    NO  Reviewed patient's current orders and MAR    YES  PMH/SH reviewed - no change compared to H&P  ________________________________________________________________________  Care Plan discussed with:    Comments   Patient x    Family      RN x    Care Manager x    Consultant                        Multidiciplinary team rounds were held today with , nursing, pharmacist and clinical coordinator. Patient's plan of care was discussed; medications were reviewed and discharge planning was addressed. ________________________________________________________________________  Total NON critical care TIME:  25   Minutes    Total CRITICAL CARE TIME Spent:   Minutes non procedure based      Comments   >50% of visit spent in counseling and coordination of care x    ________________________________________________________________________  Garfield Castleman, NP     Procedures: see electronic medical records for all procedures/Xrays and details which were not copied into this note but were reviewed prior to creation of Plan. LABS:  I reviewed today's most current labs and imaging studies.   Pertinent labs include:  Recent Labs     03/18/21  0342   WBC 5.3   HGB 10.5*   HCT 33.1*   *     Recent Labs     03/18/21  1434 03/18/21  0342   NA  --  136   K  --  4.0   CL  --  104   CO2  --  27   GLU  --  89   BUN  --  13   CREA  --  0.59   CA  --  9.7   INR 1.0  --        Signed: Garfield Castleman, NP

## 2021-03-19 NOTE — BRIEF OP NOTE
Brief Postoperative Note    Patient: Kym Moulton  YOB: 1945  MRN: [id number]    Date of Procedure: 3/19/2021     Pre-Op Diagnosis: Thoracic 12 fracture with spinal canal stenosis    Post-Op Diagnosis: same     Procedure(s):  T10- L2  PEDICLE SCREW MADISON FUSION WITH KYPHON CEMENT, DECOMPRESSION OF SPINAL CORD AT THORACIC TWELVE, THORACIC TWELVE LEFT AND RIGHT BONE BIOPSIES    Surgeon(s):  Sofya Michael MD    Surgical Assistant: Surg Asst-1: Merdis Phoenix    Anesthesia: General     Estimated Blood Loss (mL): 197FN    Complications: none    Specimens:   ID Type Source Tests Collected by Time Destination   1 : Left thoracic twelve bone biopsy Preservative Spine  Sofya Michael MD 3/19/2021 1411 Pathology   2 : Right thoracic twelve bone biopsy Preservative Spine  Sofya Michael MD 3/19/2021 1436 Pathology        Implants:   Implant Name Type Inv. Item Serial No.  Lot No. LRB No. Used Action   CEMENT BNE Gladys Formica - [id number]  CEMENT BNE Gladys Formica [id number] [employer]_WD [id number] N/A 1 Implanted   GRAFT BNE SUB [id number] SPNL DEFORMITY MAGNIFUSE SC - [id number]  GRAFT BNE SUB [id number] SPNL DEFORMITY MAGNIFUSE SC [id number] [employer] SPINALGRAFT TECH_WD [id number] N/A 1 Implanted   SCREW SPNL FEN 5.5X50 MM MULTAXL CD HORZ SOLERA 5.5/6.0 - SN/A  SCREW SPNL FEN 5.5X50 MM MULTAXL CD HORZ SOLERA 5.5/6.0 N/A [employer] SOFAMOR DANEK_WD N/A N/A 3 Implanted   SCREW SPNL FEN 5.5X45 MM MULTAXL CD HORZ SOLERA 5.5/6.0 - SN/A  SCREW SPNL FEN 5.5X45 MM MULTAXL CD HORZ SOLERA 5.5/6.0 N/A [employer] SOFAMOR DANEK_WD N/A N/A 1 Implanted   SCREW SPNL L50MM [id number] FEN SET CDH - SN/A  SCREW SPNL L50MM [id number] FEN SET CDH N/A [employer] SOFAMOR DANEK_WD N/A N/A 2 Implanted   SCREW SPNL MULTIAXIAL FEN 55 60 SOLERA 65MM X 55MM - SN/A  SCREW SPNL MULTIAXIAL FEN 55 60 SOLERA 65MM X 55MM N/A [employer] SOFAMOR DANEK_WD N/A N/A 2 Implanted   MADISON SPNL L500MM DIA5. 5MM LN ANT POST THORACOLUMBOSACRAL CP - SN/A  MADISON SPNL L500MM DIA5. 5MM LN ANT POST THORACOLUMBOSACRAL CP N/A [employer] SOFAMOR DANEK_WD N/A N/A 1 Implanted   SET SCR SPNL L6MM DIA5. 5MM TI BRK OFF AMANDA W/ DETACH [address] - SN/A  SET SCR SPNL L6MM DIA5. 5MM TI BRK OFF AMANDA W/ DETACH [address] N/A [employer] SOFAMOR DANEK_WD N/A N/A 8 Implanted       Drains:   Hemovac Left Back (Active)       Hemovac Right Back (Active)       Findings: marked instability at T12, very soft bone, good placement of instrumentation verified with intraoperative xray    Electronically Signed by Lorena Hathaway MD on 3/19/2021 at 4:44 PM

## 2021-03-19 NOTE — PROGRESS NOTES
0730-Report taken from Nieves Comment. Patient yelling out during bedside report c/o pain. She is very restless, wanting her pulse oximetry sticker removed stating \"it is hurting my leg. \" Patient will not be redirected, just continues to move around in the bed. Pain medication given by night shift RN.

## 2021-03-19 NOTE — PERIOP NOTES
78 yo wf arrived to pre=op from inpatient unit, moaning and crying out in pain, stating pain is in thighs describes as constant, cramping, ache. Patient is alert to name and place, unable to tell me year, just kept repeating dec, 19th. . After medicated with fentanyl and versed patient able to give correct year. Brace on left arm, hx of arm and shoulder surgery in dec, 2020. Patent #20 iv right medial, lower arm. Skin appears intact, sacrum non tender, no redness or signs of breakdown. Patient states had covid test recently and was negative at [facility]. denies know contact or S/S. Resting quietly on left side, 02 2 liters N/C. Side rails x4 call bell in reach.   1200: dr Janise Galeazzi medicated with 1 versed and 25 fenanyl

## 2021-03-19 NOTE — PROGRESS NOTES
1840-TRANSFER - IN REPORT:    Verbal report received from KAREN Stanford(name) on Oscar Coates  being received from [facility]) for routine post - op      Report consisted of patients Situation, Background, Assessment and   Recommendations(SBAR). Information from the following report(s) SBAR, Kardex, Procedure Summary, Intake/Output, MAR and Recent Results was reviewed with the receiving nurse. Opportunity for questions and clarification was provided. Assessment completed upon patients arrival to unit and care assumed. Patient has excessive shivering upon arrival, heart rate in the 130s, temp WDL. Patient is arousable, 98% on 2L. I spoke with Gianni Bermudez who states to keep patient on continuous pulse oximetry, Q30 min vitals and she will be up to see the patient. Report given to KAREN Mcmullen who will be assuming care for the night shift.

## 2021-03-19 NOTE — ANESTHESIA POSTPROCEDURE EVALUATION
Procedure(s): 
T10- L2  PEDICLE SCREW MADISON FUSION WITH KYPHON CEMENT, DECOMPRESSION OF SPINAL CORD AT THORACIC TWELVE, THORACIC TWELVE LEFT AND RIGHT BONE BIOPSIES. general 
 
Anesthesia Post Evaluation Patient location during evaluation: PACU Note status: Adequate. Level of consciousness: responsive to verbal stimuli and sleepy but conscious Pain management: satisfactory to patient Airway patency: patent Anesthetic complications: no 
Cardiovascular status: acceptable Respiratory status: acceptable Hydration status: acceptable Comments: +Post-Anesthesia Evaluation and Assessment Patient: Gama Wood MRN: [id number]  SSN: [id number] YOB: 1945  Age: 77 y.o. Sex: female Cardiovascular Function/Vital Signs /80 (BP 1 Location: Right upper arm, BP Patient Position: At rest)   Pulse (!) 135   Temp 36.7 °C (98 °F)   Resp 19   Ht 5' 7\" (1.702 m)   Wt 68 kg (150 lb)   SpO2 100%   BMI 23.49 kg/m² Patient is status post Procedure(s) with comments: 
T10- L2  PEDICLE SCREW MADISON FUSION WITH KYPHON CEMENT, DECOMPRESSION OF SPINAL CORD AT THORACIC TWELVE, THORACIC TWELVE LEFT AND RIGHT BONE BIOPSIES - THORACIC TEN THRU LUMBAR TWO SPINE. Nausea/Vomiting: Controlled. Postoperative hydration reviewed and adequate. Pain: 
Pain Scale 1: Numeric (0 - 10) (03/19/21 1903) Pain Intensity 1: 0 (03/19/21 1903) Managed. Neurological Status:  
Neuro (WDL): Exceptions to WDL (03/19/21 1815) At baseline. Mental Status and Level of Consciousness: Arousable. Pulmonary Status:  
O2 Device: Nasal cannula (03/19/21 1903) Adequate oxygenation and airway patent. Complications related to anesthesia: None Post-anesthesia assessment completed. No concerns. Signed By: Cindy Jimenez MD  
 3/19/2021 Post anesthesia nausea and vomiting:  controlled INITIAL Post-op Vital signs:  
Vitals Value Taken Time /73 03/19/21 1845 Temp 36.8 °C (98.3 °F) 03/19/21 1815 Pulse 101 03/19/21 1845 Resp 13 03/19/21 1845 SpO2 100 % 03/19/21 1845

## 2021-03-19 NOTE — PROGRESS NOTES
Patient scheduled for T10-L2 fusion today. Will follow up post surgery for evaluation.     Lashaun Massey MS, OTR/L, CSRS

## 2021-03-19 NOTE — PROGRESS NOTES
Problem: Falls - Risk of  Goal: *Absence of Falls  Description: Document [place] Fall Risk and appropriate interventions in the flowsheet. Outcome: Progressing Towards Goal  Note: Fall Risk Interventions:  Mobility Interventions: Communicate number of staff needed for ambulation/transfer         Medication Interventions: Patient to call before getting OOB    Elimination Interventions: Call light in reach    History of Falls Interventions: Room close to nurse's station         Problem: Patient Education: Go to Patient Education Activity  Goal: Patient/Family Education  Outcome: Progressing Towards Goal     Problem: Pressure Injury - Risk of  Goal: *Prevention of pressure injury  Description: Document Kwabena Scale and appropriate interventions in the flowsheet.   Outcome: Progressing Towards Goal  Note: Pressure Injury Interventions:  Sensory Interventions: Assess changes in LOC         Activity Interventions: Increase time out of bed    Mobility Interventions: Float heels    Nutrition Interventions: Document food/fluid/supplement intake    Friction and Shear Interventions: Lift sheet                Problem: Patient Education: Go to Patient Education Activity  Goal: Patient/Family Education  Outcome: Progressing Towards Goal

## 2021-03-20 ENCOUNTER — APPOINTMENT (OUTPATIENT)
Dept: GENERAL RADIOLOGY | Age: 76
DRG: 457 | End: 2021-03-20
Attending: NURSE PRACTITIONER
Payer: MEDICARE

## 2021-03-20 LAB
ANION GAP SERPL CALC-SCNC: 11 MMOL/L (ref 5–15)
BASOPHILS # BLD: 0 K/UL (ref 0–0.1)
BASOPHILS NFR BLD: 0 % (ref 0–1)
BUN SERPL-MCNC: 15 MG/DL (ref 6–20)
BUN/CREAT SERPL: 32 (ref 12–20)
CALCIUM SERPL-MCNC: 9.1 MG/DL (ref 8.5–10.1)
CHLORIDE SERPL-SCNC: 109 MMOL/L (ref 97–108)
CO2 SERPL-SCNC: 18 MMOL/L (ref 21–32)
CREAT SERPL-MCNC: 0.47 MG/DL (ref 0.55–1.02)
DIFFERENTIAL METHOD BLD: ABNORMAL
EOSINOPHIL # BLD: 0 K/UL (ref 0–0.4)
EOSINOPHIL NFR BLD: 0 % (ref 0–7)
ERYTHROCYTE [DISTWIDTH] IN BLOOD BY AUTOMATED COUNT: 14.9 % (ref 11.5–14.5)
GLUCOSE BLD STRIP.AUTO-MCNC: 131 MG/DL (ref 65–100)
GLUCOSE BLD STRIP.AUTO-MCNC: 136 MG/DL (ref 65–100)
GLUCOSE BLD STRIP.AUTO-MCNC: 140 MG/DL (ref 65–100)
GLUCOSE BLD STRIP.AUTO-MCNC: 164 MG/DL (ref 65–100)
GLUCOSE SERPL-MCNC: 125 MG/DL (ref 65–100)
HCT VFR BLD AUTO: 30.2 % (ref 35–47)
HGB BLD-MCNC: 9.8 G/DL (ref 11.5–16)
HGB BLD-MCNC: 9.8 G/DL (ref 11.5–16)
IMM GRANULOCYTES # BLD AUTO: 0.1 K/UL (ref 0–0.04)
IMM GRANULOCYTES NFR BLD AUTO: 1 % (ref 0–0.5)
LACTATE SERPL-SCNC: 1.1 MMOL/L (ref 0.4–2)
LYMPHOCYTES # BLD: 1 K/UL (ref 0.8–3.5)
LYMPHOCYTES NFR BLD: 10 % (ref 12–49)
MCH RBC QN AUTO: 32.7 PG (ref 26–34)
MCHC RBC AUTO-ENTMCNC: 32.5 G/DL (ref 30–36.5)
MCV RBC AUTO: 100.7 FL (ref 80–99)
MONOCYTES # BLD: 0.8 K/UL (ref 0–1)
MONOCYTES NFR BLD: 8 % (ref 5–13)
NEUTS SEG # BLD: 8.4 K/UL (ref 1.8–8)
NEUTS SEG NFR BLD: 81 % (ref 32–75)
NRBC # BLD: 0 K/UL (ref 0–0.01)
NRBC BLD-RTO: 0 PER 100 WBC
PLATELET # BLD AUTO: 437 K/UL (ref 150–400)
PMV BLD AUTO: 8.6 FL (ref 8.9–12.9)
POTASSIUM SERPL-SCNC: 4.1 MMOL/L (ref 3.5–5.1)
RBC # BLD AUTO: 3 M/UL (ref 3.8–5.2)
SERVICE CMNT-IMP: ABNORMAL
SODIUM SERPL-SCNC: 138 MMOL/L (ref 136–145)
WBC # BLD AUTO: 10.3 K/UL (ref 3.6–11)

## 2021-03-20 PROCEDURE — 74011000250 HC RX REV CODE- 250: Performed by: NURSE PRACTITIONER

## 2021-03-20 PROCEDURE — 2709999900 HC NON-CHARGEABLE SUPPLY

## 2021-03-20 PROCEDURE — 74011250636 HC RX REV CODE- 250/636: Performed by: NURSE PRACTITIONER

## 2021-03-20 PROCEDURE — 97530 THERAPEUTIC ACTIVITIES: CPT

## 2021-03-20 PROCEDURE — 80048 BASIC METABOLIC PNL TOTAL CA: CPT

## 2021-03-20 PROCEDURE — 74011250637 HC RX REV CODE- 250/637: Performed by: NEUROLOGICAL SURGERY

## 2021-03-20 PROCEDURE — 51798 US URINE CAPACITY MEASURE: CPT

## 2021-03-20 PROCEDURE — 36415 COLL VENOUS BLD VENIPUNCTURE: CPT

## 2021-03-20 PROCEDURE — 87040 BLOOD CULTURE FOR BACTERIA: CPT

## 2021-03-20 PROCEDURE — 77010033678 HC OXYGEN DAILY

## 2021-03-20 PROCEDURE — 97166 OT EVAL MOD COMPLEX 45 MIN: CPT

## 2021-03-20 PROCEDURE — 85025 COMPLETE CBC W/AUTO DIFF WBC: CPT

## 2021-03-20 PROCEDURE — 71045 X-RAY EXAM CHEST 1 VIEW: CPT

## 2021-03-20 PROCEDURE — 97163 PT EVAL HIGH COMPLEX 45 MIN: CPT

## 2021-03-20 PROCEDURE — 74011250636 HC RX REV CODE- 250/636: Performed by: NEUROLOGICAL SURGERY

## 2021-03-20 PROCEDURE — 82962 GLUCOSE BLOOD TEST: CPT

## 2021-03-20 PROCEDURE — 83605 ASSAY OF LACTIC ACID: CPT

## 2021-03-20 PROCEDURE — 74011636637 HC RX REV CODE- 636/637: Performed by: NEUROLOGICAL SURGERY

## 2021-03-20 PROCEDURE — 65270000029 HC RM PRIVATE

## 2021-03-20 PROCEDURE — 94760 N-INVAS EAR/PLS OXIMETRY 1: CPT

## 2021-03-20 PROCEDURE — 74011000250 HC RX REV CODE- 250: Performed by: NEUROLOGICAL SURGERY

## 2021-03-20 PROCEDURE — 97110 THERAPEUTIC EXERCISES: CPT

## 2021-03-20 PROCEDURE — 97535 SELF CARE MNGMENT TRAINING: CPT

## 2021-03-20 PROCEDURE — A4565 SLINGS: HCPCS

## 2021-03-20 PROCEDURE — 85018 HEMOGLOBIN: CPT

## 2021-03-20 PROCEDURE — 77030038269 HC DRN EXT URIN PURWCK BARD -A

## 2021-03-20 RX ORDER — ENOXAPARIN SODIUM 100 MG/ML
40 INJECTION SUBCUTANEOUS EVERY 12 HOURS
Status: DISCONTINUED | OUTPATIENT
Start: 2021-03-20 | End: 2021-03-20

## 2021-03-20 RX ORDER — METOPROLOL TARTRATE 5 MG/5ML
5 INJECTION INTRAVENOUS EVERY 6 HOURS
Status: DISCONTINUED | OUTPATIENT
Start: 2021-03-20 | End: 2021-03-21

## 2021-03-20 RX ORDER — ENOXAPARIN SODIUM 100 MG/ML
40 INJECTION SUBCUTANEOUS EVERY 24 HOURS
Status: DISCONTINUED | OUTPATIENT
Start: 2021-03-20 | End: 2021-03-22

## 2021-03-20 RX ADMIN — METOPROLOL TARTRATE 5 MG: 5 INJECTION INTRAVENOUS at 16:43

## 2021-03-20 RX ADMIN — DOCUSATE SODIUM 100 MG: 100 CAPSULE, LIQUID FILLED ORAL at 08:45

## 2021-03-20 RX ADMIN — BACLOFEN 10 MG: 10 TABLET ORAL at 21:34

## 2021-03-20 RX ADMIN — HYDRALAZINE HYDROCHLORIDE 10 MG: 20 INJECTION INTRAMUSCULAR; INTRAVENOUS at 08:07

## 2021-03-20 RX ADMIN — Medication 10 ML: at 21:37

## 2021-03-20 RX ADMIN — BACLOFEN 10 MG: 10 TABLET ORAL at 16:43

## 2021-03-20 RX ADMIN — HYDROMORPHONE HYDROCHLORIDE 2 MG: 2 TABLET ORAL at 22:58

## 2021-03-20 RX ADMIN — HYDROMORPHONE HYDROCHLORIDE 2 MG: 2 TABLET ORAL at 05:37

## 2021-03-20 RX ADMIN — CYCLOBENZAPRINE 10 MG: 10 TABLET, FILM COATED ORAL at 02:08

## 2021-03-20 RX ADMIN — BACLOFEN 10 MG: 10 TABLET ORAL at 08:45

## 2021-03-20 RX ADMIN — HYDRALAZINE HYDROCHLORIDE 10 MG: 20 INJECTION INTRAMUSCULAR; INTRAVENOUS at 03:08

## 2021-03-20 RX ADMIN — METOPROLOL TARTRATE 5 MG: 5 INJECTION INTRAVENOUS at 09:37

## 2021-03-20 RX ADMIN — METOPROLOL TARTRATE 5 MG: 5 INJECTION INTRAVENOUS at 21:35

## 2021-03-20 RX ADMIN — Medication 1 AMPULE: at 08:08

## 2021-03-20 RX ADMIN — Medication 10 ML: at 07:00

## 2021-03-20 RX ADMIN — INSULIN LISPRO 2 UNITS: 100 INJECTION, SOLUTION INTRAVENOUS; SUBCUTANEOUS at 16:43

## 2021-03-20 RX ADMIN — HYDROMORPHONE HYDROCHLORIDE 4 MG: 2 TABLET ORAL at 08:45

## 2021-03-20 RX ADMIN — HYDRALAZINE HYDROCHLORIDE 10 MG: 20 INJECTION INTRAMUSCULAR; INTRAVENOUS at 00:37

## 2021-03-20 RX ADMIN — Medication 1 AMPULE: at 21:35

## 2021-03-20 RX ADMIN — INSULIN LISPRO 2 UNITS: 100 INJECTION, SOLUTION INTRAVENOUS; SUBCUTANEOUS at 08:06

## 2021-03-20 RX ADMIN — ENOXAPARIN SODIUM 40 MG: 40 INJECTION SUBCUTANEOUS at 16:43

## 2021-03-20 RX ADMIN — SODIUM CHLORIDE 75 ML/HR: 9 INJECTION, SOLUTION INTRAVENOUS at 02:32

## 2021-03-20 RX ADMIN — Medication 10 ML: at 14:34

## 2021-03-20 NOTE — PROGRESS NOTES
End of Shift Note    Bedside shift change report given to John (oncoming nurse) by Jermaine Rashid (offgoing nurse). Report included the following information SBAR, Kardex, Intake/Output, MAR, Recent Results, Med Rec Status and Cardiac Rhythm Sinus Tach    Shift worked:  2551-9079     Shift summary and any significant changes:     Patient arrived to unit from PACU at 1900, patient shivering and had an elevated HR and BP. Blankets placed on patient and offgoing nurse called NP. Patient continued to shiver so NP and Anesthesia were called, Demerol was given x2. PRN hydralazine given as well almost q 2hrs. RR was called at 2100 for a mews score of 6, temp of 100.5, , RR 22, /60. See RR note. PAtient stabalized and was able to take PO meds at 2 am, labs drawn via art stick. HR remains in 110-125s     Concerns for physician to address:  Elevated HR     [facility]   [phone]       Activity:  Activity Level: Bed Rest  Number times ambulated in hallways past shift: 0  Number of times OOB to chair past shift: 0    Cardiac:   Cardiac Monitoring: Yes      Cardiac Rhythm: Sinus tachycardia    Access:   Current line(s): PIV     Genitourinary:   Urinary status: due to void    Respiratory:   O2 Device: 02 face tent, Humidifier  Chronic home O2 use?: NO  Incentive spirometer at bedside: NO     GI:  Last Bowel Movement Date: 03/18/21  Current diet:  DIET DIABETIC CONSISTENT CARB Regular  Passing flatus: YES  Tolerating current diet: YES       Pain Management:   Patient states pain is manageable on current regimen: NO    Skin:  Kwabena Score: 17  Interventions: float heels, increase time out of bed and foam dressing    Patient Safety:  Fall Score:  Total Score: 4  Interventions: bed/chair alarm, assistive device (walker, cane, etc) and gripper socks  High Fall Risk: Yes    Length of Stay:  Expected LOS: 2d 19h  Actual LOS: [facility]

## 2021-03-20 NOTE — PROGRESS NOTES
[facility] [facility] at [phone] regarding the patient needing the TLSO brace. Patient information was given along with my call back number [phone]. On call physician to call back with information. I was paged to the [place] office and they told me to try [place] at [phone]. A message was left because no one answered the phone Call back number [phone] and said that I needed to speak with the on call physician regarding the patient's TLSO brace.

## 2021-03-20 NOTE — PROGRESS NOTES
Feels pretty miserable. A lot of back pain. No leg pain, No n/t  Still shivering. T 100.5 @2056, has been afebrile since then      Motor 5/5 LE. Wound clean and dry    HB 9.8    Drain out 100 overnight, Will keep in today    Will try to mobilize slowly, talked with PT  Awaiting TLSO, however will let her get up in room without it, I do not want her on bedrest for days while waiting for it. At least start on side of bed and dangle. She is a still tachycardic, will go slow.

## 2021-03-20 NOTE — PROGRESS NOTES
End of Shift Note    Bedside shift change report given to [facility], RN (oncoming nurse) by Miri Franks (offgoing nurse). Report included the following information SBAR, Kardex, OR Summary, Procedure Summary, Intake/Output, MAR, Accordion, Recent Results and Cardiac Rhythm Stach    Shift worked:  3133-3732     Shift summary and any significant changes:     patient more confused with 4mg dilaudid     Concerns for physician to address:       Zone phone for oncoming shift:   [phone]       Activity:  Activity Level: Up with Assistance  Number times ambulated in hallways past shift: 0  Number of times OOB to chair past shift: 0    Cardiac:   Cardiac Monitoring: Yes      Cardiac Rhythm: Sinus tachycardia    Access:   Current line(s): PIV     Genitourinary:   Urinary status: external catheter    Respiratory:   O2 Device: Room air  Chronic home O2 use?: NO  Incentive spirometer at bedside: NO     GI:  Last Bowel Movement Date: 03/17/21  Current diet:  DIET DIABETIC CONSISTENT CARB Regular  Passing flatus: YES  Tolerating current diet: YES       Pain Management:   Patient states pain is manageable on current regimen: YES    Skin:  Kwabena Score: 14  Interventions: increase time out of bed and PT/OT consult    Patient Safety:  Fall Score:  Total Score: 4  Interventions: bed/chair alarm, assistive device (walker, cane, etc) and pt to call before getting OOB  High Fall Risk: Yes    Length of Stay:  Expected LOS: 2d 19h  Actual LOS: [facility]

## 2021-03-20 NOTE — ANESTHESIA POSTPROCEDURE EVALUATION
Procedure(s):  T10- L2  PEDICLE SCREW MADISON FUSION WITH KYPHON CEMENT, DECOMPRESSION OF SPINAL CORD AT THORACIC TWELVE, THORACIC TWELVE LEFT AND RIGHT BONE BIOPSIES. general    Anesthesia Post Evaluation      Multimodal analgesia: multimodal analgesia used between 6 hours prior to anesthesia start to PACU discharge  Patient location during evaluation: PACU  Patient participation: complete - patient participated  Level of consciousness: responsive to verbal stimuli and sleepy but conscious  Pain management: satisfactory to patient  Airway patency: patent  Anesthetic complications: no  Cardiovascular status: acceptable  Respiratory status: acceptable  Hydration status: acceptable  Comments: +Post-Anesthesia Evaluation and Assessment    Patient: Marcela Trotter MRN: [id number]  SSN: [id number]   YOB: 1945  Age: 77 y.o. Sex: female      Cardiovascular Function/Vital Signs    Vitals Value Taken Time  /73 03/19/21 1845  Temp 36.8 °C (98.3 °F) 03/19/21 1815  Pulse 101 03/19/21 1845  Resp 13 03/19/21 1845  SpO2 100 % 03/19/21 1845    Patient is status post Procedure(s) with comments:  T10- L2  PEDICLE SCREW MADISON FUSION WITH KYPHON CEMENT, DECOMPRESSION OF SPINAL CORD AT THORACIC TWELVE, THORACIC TWELVE LEFT AND RIGHT BONE BIOPSIES - THORACIC TEN THRU LUMBAR TWO SPINE. Nausea/Vomiting: Controlled. Postoperative hydration reviewed and adequate. Pain:  Pain Scale 1: Numeric (0 - 10) (03/19/21 2005)  Pain Intensity 1: 5 (03/19/21 2005)   Managed. Neurological Status:   Neuro (WDL): Exceptions to WDL (03/19/21 1815)   At baseline. Mental Status and Level of Consciousness: Arousable. Pulmonary Status:   O2 Device: Nasal cannula (03/19/21 1936)   Adequate oxygenation and airway patent. Complications related to anesthesia: None    Post-anesthesia assessment completed. No concerns.     Signed By: Fco Osorio MD    3/19/2021  Post anesthesia nausea and vomiting:  controlled      INITIAL Post-op Vital signs:   Vitals Value Taken Time   /73 03/19/21 1845   Temp 36.8 °C (98.3 °F) 03/19/21 1815   Pulse 101 03/19/21 1845   Resp 13 03/19/21 1845   SpO2 100 % 03/19/21 1845

## 2021-03-20 NOTE — PROGRESS NOTES
7:25 PM  Report received from Bj Spaulding. Patient in bed resting, opens eyes spontaneously and spoke to this RN. Per previous RN, the NP has been contacted regarding patients elevated HR and shivering. 8:30 PM  Anesthesia called per NP. Spoke to dr. Nataliia Gould. Telephone order for 12.5 mg of demerol stat for shivering, to be repeated x1 if first dose does not work.     8:58 PM  MEWS score of 6, RR nurse Adrian Joshua and supervisor at bedside

## 2021-03-20 NOTE — PROGRESS NOTES
RAPID RESPONSE TEAM    Stopped while rounding over concern of patient recently admitted from PACU s/p T10- L2  PEDICLE SCREW MADISON FUSION WITH KYPHON CEMENT, DECOMPRESSION OF SPINAL CORD AT THORACIC TWELVE, THORACIC TWELVE LEFT AND RIGHT BONE BIOPSIES, now with tremors, increased HR, increased BP and increased RR. Primary RN Kemi has already notified Juwan Ricardo NP and anesthesiologist MD Jose Newman, orders were received to give demerol 12.5 mg twice PRN. RN has given the initial dose of demerol 12.5 mg. Upon arrival to bedside, patient has noticeable generalized tremors, feels warm to touch, due to recent spinal surgery and respiratory distress, axillary temp checked, 100.5, increased from 98.3 thirty minutes prior. [place] adult rapid response called. NP uJwan Ricardo returned to bedside, second dose of demerol 12.5 mg IV given. Blankets removed, bilateral ice packs placed in both groin and axillary sites. Lungs diminished, face tent applied to provide humidified oxygen. 0.9% NaCl 250 ml bolus initiated. Patient states she is in pain and requesting something for pain, order received for morphine 1 mg IV once for pain.      Patient Vitals for the past 12 hrs:   Temp Pulse Resp BP SpO2   03/19/21 2124 -- (!) 132 -- 136/61 --   03/19/21 2114 98.6 °F (37 °C) (!) 133 24 (!) 124/57 97 %   03/19/21 2100 -- -- 26 -- 95 %   03/19/21 2056 (!) 100.5 °F (38.1 °C) (!) 139 22 130/60 95 %   03/19/21 2030 98.3 °F (36.8 °C) (!) 139 20 125/60 97 %   03/19/21 2005 98.3 °F (36.8 °C) (!) 136 20 (!) 150/68 97 %   03/19/21 1936 98.3 °F (36.8 °C) (!) 135 18 (!) 149/80 100 %   03/19/21 1903 98 °F (36.7 °C) (!) 135 19 138/80 100 %   03/19/21 1845 -- (!) 101 13 130/73 100 %   03/19/21 1837 -- (!) 104 18 (!) 145/64 100 %   03/19/21 1815 98.3 °F (36.8 °C) 93 13 (!) 140/63 100 %   03/19/21 1800 -- 83 15 137/61 100 %   03/19/21 1749 -- (!) 105 22 (!) 151/65 100 %   03/19/21 1745 -- (!) 104 19 (!) 163/104 100 %   03/19/21 1730 -- 80 11 (!) 146/63 100 % 03/19/21 1725 -- 84 12 (!) 145/60 100 %   03/19/21 1720 -- 88 13 (!) 143/61 100 %   03/19/21 1718 98.5 °F (36.9 °C) 92 13 (!) 152/63 100 %   03/19/21 1230 -- (!) 108 19 (!) 144/70 100 %   03/19/21 1215 -- (!) 105 17 (!) 174/96 100 %   03/19/21 1158 -- (!) 111 22 (!) 148/64 100 %   03/19/21 1130 -- (!) 116 -- (!) 142/79 --   03/19/21 1115 -- -- 20 (!) 146/95 99 %   03/19/21 1057 98.5 °F (36.9 °C) -- 16 (!) 147/71 100 %       ADDENDUM:  0013: Rounded on patient due to recent rapid response, patient more alert, talkative, continues to c/o pain, recently medicated with morphine 2 mg IV. Some tremors still noted in BUE, breath sounds clear,  sinus tach. Please call with any needs or concerns.      Cheryl Lopez  Rapid Response KAREN Alva

## 2021-03-20 NOTE — PROGRESS NOTES
Hospitalist Progress Note    NAME: Sage Suh   :  1945   MRN:  [id number]     I reviewed pertinent labs and imaging, and discussed /agreed on the plan of care with Dr. Abraham Young. Assessment / Plan:  Severe Lumbago   Severe Compression Deformity at T12   · CT Spine 3/17             1. Severe compression deformity at T12, which may be subacute or acute, with 5 mm associated retropulsion of bone at the superior endplate and central stenosis at T11-12. 2. Multilevel degenerative change otherwise with central and foraminal stenosis. 3. Left adrenal adenoma or cyst.             4. Generalized osteopenia with a single focal area of low density in the vertebral body of E03, of uncertain if any clinical significance. Correlate with clinical history. Further evaluation would require lumbar spine MRI. · MRI Thoracic Spine            1. Acute or subacute T12 compression deformity with retropulsion of bone toward the thecal sac, effacing the distal spinal cord. 2. Overall mild thoracic kyphosis and multilevel degenerative change with no other compression deformity.    · Appreciate Neurosurgery - S/p T10-L2 fusion, decompression of spinal cord at T12 and biopsy of T12 vertebral body POD #1   · Continue scheduled baclofen, lidocaine patch, PRN cyclobenzaprine, PRN dilaudid for pain and muscle spasms    · Continue stool softeners  · PT/OT evaluations after surgery     New Onset Tachycardia   Fever with Chills Overnight   · EKG ST and has been all night   · Start IV metoprolol   · No further fevers since 2100 last night   · Check blood cultures   · Lactate WNL   · Check procalcitonin in AM   · Obtain CXR - if negative may consider CT Chest to rule out PE   · So far not hypoxic     Hypertension   · Does not appear to take any anti-hypertensives  · So far BP controlled   · Monitor     Type II Diabetes  · History of DM but not on any coverage OP   · HgbA1c 5.3    S/p ORIF of Left Humerus Fracture x 5 years    New Limited ROM to Left Arm s/p Fall 2 weeks Ago   · XR Humerus/Forearm - Surgical fixation of left humerus fracture   · CXR - Left shoulder dislocation. Right clavicle and upper rib fractures, age indeterminate. · Appreciate Ortho Input - S/p closed reduction of should dislocation  · Follow up with [facility]   · Continue wrist brace   · PT/OT evaluations   · Patient reports some swelling to arm - dopplers NEGATIVE for DVT     18.5 - 24.9 Normal weight / Body mass index is 23.49 kg/m². Code status: Full  Prophylaxis: Lovenox   Recommended Disposition: HH vs SNF     Subjective:     Chief Complaint / Reason for Physician Visit  Patient seen at bedside, she stated she doesn't feel well and had a rough night. Discussed plan of care and patient in agreement  Discussed with RN events overnight. Review of Systems:  Symptom Y/N Comments  Symptom Y/N Comments   Fever/Chills y   Chest Pain n    Poor Appetite n   Edema n    Cough n   Abdominal Pain n    Sputum n   Joint Pain n    SOB/COULTER n   Pruritis/Rash n    Nausea/vomit n   Tolerating PT/OT     Diarrhea n   Tolerating Diet     Constipation n   Other y Back pain      Could NOT obtain due to:      Objective:     VITALS:   Last 24hrs VS reviewed since prior progress note.  Most recent are:  Patient Vitals for the past 24 hrs:   Temp Pulse Resp BP SpO2   03/20/21 1240 -- (!) 109 -- (!) 152/66 --   03/20/21 1200 -- (!) 112 -- 130/60 --   03/20/21 1115 98.8 °F (37.1 °C) (!) 112 20 (!) 120/57 96 %   03/20/21 0937 -- (!) 127 -- (!) 131/59 --   03/20/21 0736 98.5 °F (36.9 °C) (!) 120 20 (!) 144/65 98 %   03/20/21 0406 97.8 °F (36.6 °C) (!) 122 20 134/61 100 %   03/20/21 0338 97.8 °F (36.6 °C) (!) 120 20 134/63 100 %   03/20/21 0308 -- (!) 117 -- (!) 158/69 --   03/20/21 0236 -- (!) 115 20 (!) 148/64 100 %   03/20/21 0126 -- (!) 114 -- (!) 151/67 --   03/20/21 0104 -- (!) 123 -- (!) 152/67 --   03/20/21 0037 97.7 °F (36.5 °C) (!) 112 20 (!) 154/70 100 %   03/19/21 2338 98.1 °F (36.7 °C) (!) 117 22 (!) 157/77 100 %   03/19/21 2335 -- (!) 123 -- (!) 150/71 --   03/19/21 2306 98.1 °F (36.7 °C) (!) 121 22 (!) 151/68 98 %   03/19/21 2234 97.5 °F (36.4 °C) (!) 122 22 (!) 146/67 100 %   03/19/21 2210 97.5 °F (36.4 °C) (!) 118 20 (!) 144/63 100 %   03/19/21 2157 97.7 °F (36.5 °C) (!) 122 -- -- --   03/19/21 2124 -- (!) 132 -- 136/61 --   03/19/21 2114 98.6 °F (37 °C) (!) 133 24 (!) 124/57 97 %   03/19/21 2103 -- (!) 135 26 120/63 96 %   03/19/21 2100 -- -- 26 -- 95 %   03/19/21 2056 (!) 100.5 °F (38.1 °C) (!) 139 22 130/60 95 %   03/19/21 2030 98.3 °F (36.8 °C) (!) 139 20 125/60 97 %   03/19/21 2005 98.3 °F (36.8 °C) (!) 136 20 (!) 150/68 97 %   03/19/21 1936 98.3 °F (36.8 °C) (!) 135 18 (!) 149/80 100 %   03/19/21 1903 98 °F (36.7 °C) (!) 135 19 138/80 100 %   03/19/21 1845 -- (!) 101 13 130/73 100 %   03/19/21 1837 -- (!) 104 18 (!) 145/64 100 %   03/19/21 1815 98.3 °F (36.8 °C) 93 13 (!) 140/63 100 %   03/19/21 1800 -- 83 15 137/61 100 %   03/19/21 1749 -- (!) 105 22 (!) 151/65 100 %   03/19/21 1745 -- (!) 104 19 (!) 163/104 100 %   03/19/21 1730 -- 80 11 (!) 146/63 100 %   03/19/21 1725 -- 84 12 (!) 145/60 100 %   03/19/21 1720 -- 88 13 (!) 143/61 100 %   03/19/21 1718 98.5 °F (36.9 °C) 92 13 (!) 152/63 100 %       Intake/Output Summary (Last 24 hours) at 3/20/2021 1443  Last data filed at 3/20/2021 4122  Gross per 24 hour   Intake 1400 ml   Output 1150 ml   Net 250 ml        I had a face to face encounter and independently examined this patient on 3/20/2021, as outlined below:  PHYSICAL EXAM:  General: WD, WN. Alert, cooperative, elderly female in no acute distress  EENT:  EOMI. Anicteric sclerae. MMM  Resp:  CTA bilaterally, no wheezing or rales. No accessory muscle use  CV:  ST,  No edema  GI:  Soft, Non distended, Non tender. +Bowel sounds  Neurologic:  Alert and oriented X 3, normal speech,   Psych:   Good insight. Not anxious nor agitated  Skin:  No rashes. No jaundice    Reviewed most current lab test results and cultures  YES  Reviewed most current radiology test results   YES  Review and summation of old records today    NO  Reviewed patient's current orders and MAR    YES  PMH/SH reviewed - no change compared to H&P  ________________________________________________________________________  Care Plan discussed with:    Comments   Patient x    Family      RN x    Care Manager     Consultant                        Multidiciplinary team rounds were held today with , nursing, pharmacist and clinical coordinator. Patient's plan of care was discussed; medications were reviewed and discharge planning was addressed. ________________________________________________________________________  Total NON critical care TIME:  25   Minutes    Total CRITICAL CARE TIME Spent:   Minutes non procedure based      Comments   >50% of visit spent in counseling and coordination of care x    ________________________________________________________________________  Terry Cancer, NP     Procedures: see electronic medical records for all procedures/Xrays and details which were not copied into this note but were reviewed prior to creation of Plan. LABS:  I reviewed today's most current labs and imaging studies.   Pertinent labs include:  Recent Labs     03/20/21  0954 03/20/21  0532 03/18/21  0342   WBC 10.3  --  5.3   HGB 9.8* 9.8* 10.5*   HCT 30.2*  --  33.1*   *  --  421*     Recent Labs     03/20/21  0532 03/18/21  1434 03/18/21  0342     --  136   K 4.1  --  4.0   *  --  104   CO2 18*  --  27   *  --  89   BUN 15  --  13   CREA 0.47*  --  0.59   CA 9.1  --  9.7   INR  --  1.0  --        Signed: Terry Meeks, NP

## 2021-03-20 NOTE — OP NOTES
[address]  OPERATIVE REPORT    Name:  Domingo Joiner  MR#:  [id number]  :  1945  ACCOUNT #:  [de-identified]  DATE OF SERVICE:  2021    NEUROSURGERY CONSULTATION    PREOPERATIVE DIAGNOSIS:  Thoracic 12 fracture with retropulsion and spinal canal stenosis. POSTOPERATIVE DIAGNOSIS:  Thoracic 12 fracture with retropulsion and spinal canal stenosis. PROCEDURE PERFORMED:  T10 through L2 pedicle screw and lm fusion using fenestrated screws and Kyphon cement; fusion with allograft (morselized nonstructural) and autograft (morselized nonstructural); decompression of spinal cord with laminectomy at T12; [id number] left and right bone biopsies. SURGEON:  Tigre Flowers MD    ASSISTANT:  Jolanta Moise. ANESTHESIA:  General.    COMPLICATIONS:  None. SPECIMENS REMOVED:  Thoracic 12 vertebral body biopsy sent, 2 specimens right and left. IMPLANTS:  Please see operative record. DRAINS:  Hemovac. ESTIMATED BLOOD LOSS:  100 mL. FINDINGS:  Marked instability at T12, very soft osteoporotic bone, good placement of instrumentation verified with intraoperative x-ray. INDICATIONS FOR SURGERY:  This is a 80-year-old woman who said that she had a minor fall while cleaning her home and she thought that she had pulled her back. That happened approximately 2 weeks before admission. She had a progressive back pain. Two days prior to admission, she was unable to ambulate without significant pain. On the day of admission, she was unable to walk secondary to the pain. She denies any bowel or bladder dysfunction. She denies any weakness or numbness in her lower extremities. Workup included a CT scan that revealed a significant compression fracture with retropulsion at T12. MRI confirmed T12 compression fracture with retropulsion with marked narrowing of the spinal canal.  There was no cord signal change, but the fracture did abut the spinal cord at the T12 level.   There also was edema, suggesting fracture perhaps through the pedicles at this level. Given the degree of compression and spinal canal stenosis, it was felt it should be stabilized and decompressed surgically. Risks and benefits were discussed. She understood these and agreed to proceed. Of note, she does have osteoporosis; therefore, I decided to fuse her with fenestrated screws and Kyphon cement. OPERATIVE COURSE:  The patient was brought to the operating room. She was placed under general endotracheal anesthesia. She received vancomycin within 1 hour prior to incision as she has a PENICILLIN ALLERGY. She was turned prone onto gel rolls. Of note, she has a dislocated shoulder on the left and a clavicle fracture on the right. I opted to have her arms stay at her side and performed the surgery on gel rolls rather than a Garfield table. She had good positioning. We marked the midline incision and marked the T10 through L2 levels using preoperative intraoperative x-rays. She was sterilely prepped and draped in a standard fashion. She did have SCDs on and functioning during the entire case. I infiltrated the proposed incision with lidocaine with epinephrine. I used a #10 blade to incise the skin. Hemostasis was obtained with Bovie and bipolar cautery. Dissection was carried down to the spinous processes and then in a subperiosteal plane to minimize bleeding to expose the facet joints at T10, T11, T12, L1, L2. Self-retaining retractors were placed. I used intraoperative x-ray to verify my position. After I had good exposure, I then turned my attention to biopsy T12. I used Kyphon biopsy needles and performed biopsy using fluoroscopy, both on the right and left side at the T12 vertebral body going through the pedicles. This was a transpedicular bone biopsy. Specimen was sent to Pathology.   I then performed decompression behind the T12 vertebral body, decompressing the T11-12 level as well as the T12-L1 level. When I finished, there was no compression over the thecal sac. I verified the decompression levels with intraoperative x-ray. This was a complete laminectomy at T12, and partial laminectomies of T11 and L1. I did not perform foraminotomies. Hemostasis was obtained with FloSeal.  I then turned my attention to placement of pedicle screws. For placement of pedicle screws, I performed the following steps at each level:  First, I made a  hole using a matchstick drill. Second, I used a pedicle probe to find the pedicle. Third, I probed the pedicle with a ball point probe. Fourth, I placed the pedicle screw. I placed fenestrated screws at all levels. At T10, I placed 5.5 mm diameter 50 mm long screws with good purchase. At T11, on the left, I placed a 5.5 mm diameter screw, 50 mm in length. On the right, I placed a 5.5 mm, 45 mm length screw. I did not place screws into T12 as this level had significant fracture. At L1 and L2, because these were lumbar vertebral bodies,  I used  6.5 mm diameter screws. I placed 50 mm long screws at L1 and 55 mm long screws at L2. All instrumentation was verified with AP and lateral imaging within the operating room. After screws were all placed and I verified their positioning,   I inserted Kyphon cement into the screws. I placed approximately 1 mL of Kyphon cement into each of the fenestrated pedicle screws with good extravasation and interdigitation of the Kyphon cement verified with intraoperative xray. I then placed 130 mm rods bilaterally and  Secured them with set screws. I irrigated with over a liter of irrigation, I did irrigate with Irrisept and irrigated after with normal saline. I then decorticated at T10-T11, L1-L2. I placed autograft (morselized nonstructural) at both these levels and I placed Magnifuse (allograft nonstructural). I then placed Hemovac drain. I placed 2 drains connected to a single reservoir.   These were tunneled beneath the skin and then closed using 0 interrupted Vicryl for the muscle and muscle fascia, 2-0 interrupted Vicryl for Bertha's fascia, 3-0 interrupted Vicryl for the subcutaneous tissue, and 4-0 running Monocryl for the skin. The patient tolerated the surgery well and was taken to the postoperative care unit.       Jack Montoya MD      [id number]  D:  03/19/2021 17:35  T:  03/20/2021 4:24  JOB #:  [id number]

## 2021-03-20 NOTE — PROGRESS NOTES
Rapid Response Team Note    Called by RN at 9:07 PM to see patient for persistent post operative shivering, tachycardia, increasing temp stat. Upon entering the room the patient was lethargic but arousable recently received Demerol 12.5mg but still with persistent shivering. Events as described by RN caring for patient noted. Visit Vitals  /60   Pulse (!) 139   Temp (!) 100.5 °F (38.1 °C)   Resp 26   Ht 5' 7\" (1.702 m)   Wt 68 kg (150 lb)   SpO2 95%   BMI 23.49 kg/m²       Gen: lethargic, in mild distress  HEENT: WNL  Chest: symmetrical  Abd: soft, non-tender  Neuro: lethargic, follows some commands  Ext: move all extremities to command     Pertinent Recent Labs and Xrays:  Recent Labs     03/18/21  0342   WBC 5.3   HGB 10.5*   HCT 33.1*   *     Recent Labs     03/18/21  0342      K 4.0      CO2 27   BUN 13   CREA 0.59   GLU 89   CA 9.7     Recent Labs     03/18/21  1434   INR 1.0      No results for input(s): PH, PCO2, PO2 in the last 72 hours. No results for input(s): PHI, PO2I, PCO2I in the last 72 hours. No results for input(s): CPK, CKNDX, TROIQ in the last 72 hours.     No lab exists for component: CPKMB  Lab Results   Component Value Date/Time    Glucose (POC) 118 (H) 03/19/2021 05:24 PM    Glucose (POC) 82 03/19/2021 11:03 AM    Glucose (POC) 102 (H) 03/19/2021 07:54 AM    Glucose (POC) 105 (H) 03/18/2021 10:18 PM    Glucose (POC) 93 03/18/2021 03:57 PM       A/P: Post operative tachycardia  Post operative tremors/shivering  Post operative hyperthermia  Transient stridor  -  ml IV bolus x 1  - ice to groin and axillary area  - pain management   - anesthesia to manage shivering - Demerol 12.5 mg x 2 dose administered  - airway humidification with face tent  - transfer to stepdown if symptoms does not improve      Ifeoma Tony NP  Critical care time unrelated to prior notes: 30 minutes

## 2021-03-20 NOTE — PROGRESS NOTES
Problem: Self Care Deficits Care Plan (Adult)  Goal: *Acute Goals and Plan of Care (Insert Text)  Description: FUNCTIONAL STATUS PRIOR TO ADMISSION: Independent with majority of ADL tasks. Was receiving assist with showering from paid caregiver. No longer drives. Sustained at least x2 falls since December 2020. HOME SUPPORT: Lived alone with her 2 cats. Recently hired paid caregiver who was assisting Pt with showering and IADL tasks a few times per week for a few hours starting in December 2020. Caregiver recently had to assist Pt daily \"with everything\" s/p recent fall d/t Pts back and LUE p! Caregiver also drives Pt to appointments. Occupational Therapy Goals  Initiated 3/20/2021    1. Patient will perform self-feeding with modified independence within 7 days. 2.  Patient will perform upper body dressing with Mod A using R non-dominant hand and adaptive strategies PRN within 7 days. 3.  Patient will lower body dressing with Mod A using R non-dominant hand and adaptive strategies PRN within 7 days. 4.  Patient will don/doff back brace with Mod A within 7 days. 5.  Patient will verbalize/demonstrate 3/3 back precautions during ADL tasks without cues within 7 days. Outcome: Not Met   OCCUPATIONAL THERAPY EVALUATION  Patient: Ania Burrell (60 y.o. female)  Date: 3/20/2021  Primary Diagnosis: Back pain [M54.9]  Compression fracture of body of thoracic vertebra (HCC) [S22.000A]  Procedure(s) (LRB):  T10- L2  PEDICLE SCREW MADISON FUSION WITH KYPHON CEMENT, DECOMPRESSION OF SPINAL CORD AT THORACIC TWELVE, THORACIC TWELVE LEFT AND RIGHT BONE BIOPSIES (N/A) 1 Day Post-Op   Precautions:    NWB L UE with sling    ASSESSMENT  Based on the objective data described below, the patient presents with confusion, tachycardia, significant back and L UE p!, impaired functional mobility and poor endurance on POD #1 s/p T10-L2 fusion and decompression of SC at T12 with laminectomy.  L humerus XR on 3/18/21 also revealed prior surgical fixation of L humerus s/p fracture with addendum \"Anterior-inferior dislocation of the left humeral head. \" Pt is awaiting TLSO brace, yet neurosurgeon PA instructed OT/PT to proceed with evaluation at EOB only and to mobilize slowly given tachycardia until TLSO arrives given risks for prolonged immobility in bed. This therapist secured and donned sling to Pts L UE on arrival given L humeral head dislocation, as there was not a sling present in her room on arrival. Pt also with CTS brace on L forearm on arrival (d/t prior L wrist drop, per chart) with observed 3+ pitting edema at wrist and Pt c/o L wrist p! CTS brace was doffed as L wrist was then adequately supported by sling. Instructed Pt on NWB L UE precautions at this time and to keep sling on, pending further instruction from ortho MD/PA/NP. Pt tolerated EOB sitting for about 30 seconds before needing to be returned supine d/t c/o excruciating back p! HR elevated to 128 sitting EOB and returned to low 100s supine. She will require rehab upon D/C to [place] [id number]. She is L hand dominant and requiring up to Max-Total Assist x2 with bed mobility and ADL tasks, far below her baseline. Need further clarification from Ortho MD/PA/NP re: precautions and any interventions planned for L humeral head dislocation. Current Level of Function Impacting Discharge (ADLs/self-care): Min to Total A    Functional Outcome Measure: The patient scored 15/100 on the Barthel Index outcome measure which is indicative of requiring up to 85% assist with basic ADL tasks and related mobility. Other factors to consider for discharge: Confusion, High fall risk, Pain control, and L humeral head dislocation    Patient will benefit from skilled therapy intervention to address the above noted impairments.        PLAN :  Recommendations and Planned Interventions: self care training, functional mobility training, balance training, therapeutic activities, endurance activities, patient education and home safety training    Frequency/Duration: Patient will be followed by occupational therapy 5 times a week to address goals. Recommendation for discharge: (in order for the patient to meet his/her long term goals)  Therapy 5-7 days/week, up to 3 hours/day    This discharge recommendation:  Has been made in collaboration with the attending provider and/or case management    IF patient discharges home will need the following DME: TBD by rehab       SUBJECTIVE:   Patient stated I need to lay down!!\"    OBJECTIVE DATA SUMMARY:   HISTORY:   Past Medical History:   Diagnosis Date    Diabetes ([facility] [address]    History reviewed. No pertinent surgical history. Expanded or extensive additional review of patient history: s/p ORIF L humerus in January 2021, L wrist drop s/p surgery with brace    Home Situation  Home Environment: Private residence  # Steps to Enter: 0  Wheelchair Ramp: No  One/Two Story Residence: One story  Living Alone: Yes  Support Systems: Friends \ neighbors, Family member(s)(2 cats; sister POA; paid cgr q am)  Patient Expects to be Discharged to[de-identified] Unknown  Current DME Used/Available at Home: Cane, straight, Brace/Splint, Grab bars, Shower chair, Walker, rolling  Tub or Shower Type: Shower    Hand dominance: Left    EXAMINATION OF PERFORMANCE DEFICITS:  Cognitive/Behavioral Status:  Neurologic State: Confused; Alert  Orientation Level: Oriented to person;Oriented to place; Disoriented to time;Disoriented to situation  Cognition: Decreased command following;Decreased attention/concentration        Safety/Judgement: Decreased awareness of environment; Fall prevention;Lack of insight into deficits    Skin: Bruising along BUEs, otherwise intact. Edema: 3+ pitting edema at L forearm beneath CTS brace observed on arrival.     Hearing:   Auditory  Auditory Impairment: None  Hearing Aids/Status: Does not own    Vision/Perceptual: Acuity: Within Defined Limits    Corrective Lenses: Glasses    Range of Motion:  AROM: Generally decreased, functional(BLEs, RUE WFL, LUE not assessed 2/2 humeral head dislocation)                         Strength:  Strength: Generally decreased, functional                Coordination:  Coordination: Generally decreased, functional  Fine Motor Skills-Upper: Left Impaired;Right Intact(not assessed 2/2 brace on L wrist and significant edema)    Gross Motor Skills-Upper: Left Impaired;Right Intact(LUE not assessed 2/2 L humeral head dislocation-sling placed)    Tone & Sensation:    Tone: Normal  Sensation: (difficult to assess 2/2 confusion)       Balance:  Sitting: Impaired(2/2 significant c/o back p!)  Sitting - Static: Fair (occasional)  Sitting - Dynamic: Not tested  Standing: (NT d/t pain levels, unable to tolerate sitting)    Functional Mobility and Transfers for ADLs:  Bed Mobility:  Rolling: Maximum assistance;Assist x2(to R side only)  Supine to Sit: Maximum assistance;Assist x2(can assist bringing BLEs to EOB)  Scooting: Total assistance    Transfers:  Sit to Stand: Other (comment)(NT d/t intractable p! level in sitting)    ADL Assessment:  Feeding: Minimum assistance(using R non-dominant hand)    Oral Facial Hygiene/Grooming: Maximum assistance(using R non-dominant hand)    Bathing: Maximum assistance    Upper Body Dressing: Maximum assistance    Lower Body Dressing: Total assistance    Toileting: Total assistance     ADL Intervention and task modifications:    Patient instructed on back precautions with MAX multimodal cues t/o bed mobility. Donned sling to L UE given imaging results of L humeral head dislocation in chart. Guided Pt through safe log-roll technique at EOB. Upper Body Dressing Assistance  Orthotics(Brace): Total assistance (dependent)(LUE sling donned by therapist)    Lower Body Dressing Assistance  Socks:  Total assistance (dependent)  Position Performed: Supine    Cognitive Retraining  Safety/Judgement: Decreased awareness of environment; Fall prevention;Lack of insight into deficits    Functional Measure:  Barthel Index:    Bathin  Bladder: 5  Bowels: 5  Groomin  Dressin  Feedin  Mobility: 0  Stairs: 0  Toilet Use: 0  Transfer (Bed to Chair and Back): 0  Total: 15/100        The Barthel ADL Index: Guidelines  1. The index should be used as a record of what a patient does, not as a record of what a patient could do. 2. The main aim is to establish degree of independence from any help, physical or verbal, however minor and for whatever reason. 3. The need for supervision renders the patient not independent. 4. A patient's performance should be established using the best available evidence. Asking the patient, friends/relatives and nurses are the usual sources, but direct observation and common sense are also important. However direct testing is not needed. 5. Usually the patient's performance over the preceding 24-48 hours is important, but occasionally longer periods will be relevant. 6. Middle categories imply that the patient supplies over 50 per cent of the effort. 7. Use of aids to be independent is allowed. Monse Alfaro., Barthel, DLenoW. ([id number]). Functional evaluation: the Barthel Index. [address] [facility] (14)2. RICHIE Singh, Karlos Grissom., Stephanie Lloyd., [place], [address] [place] [address] (). Measuring the change indisability after inpatient rehabilitation; comparison of the responsiveness of the Barthel Index and Functional [place] Measure. Journal of Neurology, Neurosurgery, and Psychiatry, 66(4), 790-953. JACKLYN Olivares.SUDHIR, LUIS MIGUEL Camilo, & Jackie Waggoner MLenoA. (2004.) Assessment of post-stroke quality of life in cost-effectiveness studies: The usefulness of the Barthel Index and the EuroQoL-5D.  Quality of Life Research, 15, 371-38       Occupational Therapy Evaluation Charge Determination   History Examination Decision-Making   MEDIUM Complexity : Expanded review of history including physical, cognitive and psychosocial  history  MEDIUM Complexity : 3-5 performance deficits relating to physical, cognitive , or psychosocial skils that result in activity limitations and / or participation restrictions MEDIUM Complexity : Patient may present with comorbidities that affect occupational performnce. Miniml to moderate modification of tasks or assistance (eg, physical or verbal ) with assesment(s) is necessary to enable patient to complete evaluation       Based on the above components, the patient evaluation is determined to be of the following complexity level: MEDIUM  Pain Rating:  Pt with c/o 10/10 back pain and 8/10 L UE pain. Activity Tolerance:   Poor and d/t pain levels. After treatment patient left in no apparent distress:    Supine in bed, Heels elevated for pressure relief, Call bell within reach, Bed / chair alarm activated and Side rails x 3    COMMUNICATION/EDUCATION:   The patients plan of care was discussed with: Physical therapist, Registered nurse and Patient. Patient is unable to participate in goal setting and plan of care.       Thank you for this referral.  London Johnson OTR/L  Time Calculation: 33 mins

## 2021-03-20 NOTE — PROGRESS NOTES
Problem: Mobility Impaired (Adult and Pediatric)  Goal: *Acute Goals and Plan of Care (Insert Text)  Description: FUNCTIONAL STATUS PRIOR TO ADMISSION: Patient required moderate assistance for basic and instrumental ADLs due to back pain. Her ability to walk and transfer progressively declined following a fall 2 weeks ago. Her LUE was non-functional due to pain following recent fall - poor ability to use it prior to that since falling in December fracturing her humerus - ORIF at [facility]. HOME SUPPORT PRIOR TO ADMISSION: The patient lived alone with paid caregiver to provide assistance during the day. POA lives outside the state. Home has no steps to enter. Physical Therapy Goals  Initiated 3/20/2021    1. Patient will move from supine to sit and sit to supine  and roll side to side in bed with moderate assistance  within 4 days. 2. Patient will perform sit to stand with moderate assistance x 2 within 4 days. 3. Patient will ambulate with moderate assistance x 2 for 15 feet with the least restrictive device within 4 days. 4. Patient will verbalize and demonstrate understanding of spinal precautions (No bending, lifting greater than 5 lbs, or twisting; log-roll technique; frequent repositioning as instructed) within 4 days.      Outcome: Not Met  PHYSICAL THERAPY EVALUATION  Patient: Shakila Santoyo (26 y.o. female)  Date: 3/20/2021  Primary Diagnosis: Back pain [M54.9]  Compression fracture of body of thoracic vertebra (HCC) [S22.000A]  Procedure(s) (LRB):  T10- L2  PEDICLE SCREW MADISON FUSION WITH KYPHON CEMENT, DECOMPRESSION OF SPINAL CORD AT THORACIC TWELVE, THORACIC TWELVE LEFT AND RIGHT BONE BIOPSIES (N/A) 1 Day Post-Op   Precautions:  Spinal, Skin, Fall, Other (comment)(TLSO needed - RN called [place])    ASSESSMENT  Based on the objective data described below, the patient presents as oriented and interactive with conversational perseveration following Dilaudid dose - she pulled out her neck IV prior to PT seeing her. Of note, her LUE is very painful to pressure and movement - xray notes she has an anterior/inferior humerus dislocation. OT applied a sling to improve positioning and comfort with good success. Her sensation and motor control in BLEs is intact. She was educated on spine precautions and log roll technique was used for transfers supine to sit and back to supine. She was unable to tolerate sitting more than 2-3 minutes due to severe increase in her back pain in this position. Once back in bed and positioned correctly she was unable to tolerate the bed sitting position more than 30 degrees. Updated the nurse on patients status and the need for a TSLO brace which she has called some vzaar and left messages. Patient will likely need rehab post hospitalization to regain functional independence and safety with mobility. Current Level of Function Impacting Discharge (mobility/balance): max a x 2 supine to sit with log roll; total assist x 2 sit to supine with log roll. Functional Outcome Measure: The patient scored 15/100 on the Barthel outcome measure which is indicative of 85% functional impairment with high level of dependency for ADLs and mobility. Other factors to consider for discharge: pain management; needs TLSO, high fall risk, high risk for skin breakdown, lives alone, limited help available at home     Patient will benefit from skilled therapy intervention to address the above noted impairments. PLAN :  Recommendations and Planned Interventions: bed mobility training, transfer training, gait training, therapeutic exercises, neuromuscular re-education, patient and family training/education, and therapeutic activities      Frequency/Duration: Patient will be followed by physical therapy:  daily to address goals.     Recommendation for discharge: (in order for the patient to meet his/her long term goals)  Therapy 3 hours per day 5-7 days per week    This discharge recommendation:  Has not yet been discussed the attending provider and/or case management    IF patient discharges home will need the following DME: bedside commode, hospital bed, and wheelchair       SUBJECTIVE:   Patient stated I can't take this!  - after sitting on the side of bed with PT/OT    OBJECTIVE DATA SUMMARY:   HISTORY:    Past Medical History:   Diagnosis Date    Diabetes ([facility] [address]    History reviewed. No pertinent surgical history. Personal factors and/or comorbidities impacting plan of care: lives alone; high fall risk, pain management    Home Situation  Home Environment: Private residence  # Steps to Enter: 0  Wheelchair Ramp: No  One/Two Story Residence: One story  Living Alone: Yes  Support Systems: Friends \ neighbors, Family member(s)(2 cats; sister POA; paid cgr q am)  Patient Expects to be Discharged to[de-identified] Unknown  Current DME Used/Available at Home: [place], straight, Brace/Splint, Grab bars, Shower chair, Walker, rolling  Tub or Shower Type: Shower    EXAMINATION/PRESENTATION/DECISION MAKING:   Critical Behavior:  Neurologic State: Confused, Alert  Orientation Level: Oriented to person, Oriented to place, Disoriented to time, Disoriented to situation  Cognition: Decreased command following, Decreased attention/concentration  Safety/Judgement: Decreased awareness of environment, Fall prevention, Lack of insight into deficits  Hearing:   Auditory  Auditory Impairment: None  Hearing Aids/Status: Does not own  Skin:  back incision is CDI  Edema: moderate in L arm and hand  Range Of Motion:  AROM: Generally decreased, functional(BLEs, RUE WFL, LUE not assessed 2/2 humeral head dislocation)                       Strength:    Strength: Generally decreased, functional                    Tone & Sensation:   Tone: Normal              Sensation: (difficult to assess 2/2 confusion)               Coordination:  Coordination: Generally decreased, functional  Vision:   Acuity: Within Defined Limits  Corrective Lenses: Glasses  Functional Mobility:  Bed Mobility:  Rolling: Maximum assistance;Assist x2(to R side only; log roll)  Supine to Sit: Maximum assistance;Assist x2(can assist bringing BLEs to EOB/log roll)  Sit to Supine: Total assistance;Assist x2(log roll)  Scooting: Total assistance  Transfers:  Sit to Stand: Other (comment)(NT d/t intractable p! level in sitting)                          Balance:   Sitting: Impaired(2/2 significant c/o back p!)  Sitting - Static: Fair (occasional)  Sitting - Dynamic: Not tested  Standing: (NT d/t pain levels, unable to tolerate sitting)    Therapeutic Exercises: Ankle pumps, quad sets, glut sets, heel slides    Functional Measure:  Barthel Index:    Bathin  Bladder: 5  Bowels: 5  Groomin  Dressin  Feedin  Mobility: 0  Stairs: 0  Toilet Use: 0  Transfer (Bed to Chair and Back): 0  Total: 15/100       The Barthel ADL Index: Guidelines  1. The index should be used as a record of what a patient does, not as a record of what a patient could do. 2. The main aim is to establish degree of independence from any help, physical or verbal, however minor and for whatever reason. 3. The need for supervision renders the patient not independent. 4. A patient's performance should be established using the best available evidence. Asking the patient, friends/relatives and nurses are the usual sources, but direct observation and common sense are also important. However direct testing is not needed. 5. Usually the patient's performance over the preceding 24-48 hours is important, but occasionally longer periods will be relevant. 6. Middle categories imply that the patient supplies over 50 per cent of the effort. 7. Use of aids to be independent is allowed. Keisha Lu., Barthel, D.W. ([id number]). Functional evaluation: the Barthel Index. [address] [facility] (14)2. Andre Ochoa baltazar RICHIE Richard, Deann Kelvin., Mira Tineo., [place], [address] [place] [address] ().  Measuring the change indisability after inpatient rehabilitation; comparison of the responsiveness of the Barthel Index and Functional [place] Measure. Journal of Neurology, Neurosurgery, and Psychiatry, 66(4), 403-314. WARREN Almazan, LUIS MIGUEL Camilo, & Litzy Womack M.A. (2004.) Assessment of post-stroke quality of life in cost-effectiveness studies: The usefulness of the Barthel Index and the EuroQoL-5D. Quality of Life Research, 15, 784-90        Physical Therapy Evaluation Charge Determination   History Examination Presentation Decision-Making   MEDIUM  Complexity : 1-2 comorbidities / personal factors will impact the outcome/ POC  HIGH Complexity : 4+ Standardized tests and measures addressing body structure, function, activity limitation and / or participation in recreation  HIGH Complexity : Unstable and unpredictable characteristics  Other outcome measures Barthel  HIGH       Based on the above components, the patient evaluation is determined to be of the following complexity level: HIGH     Pain Rating:  10/10 sitting on edge of bed; 8/10 once back to bed - RN notified    Activity Tolerance:   Poor, SpO2 stable on RA, requires frequent rest breaks, and limited by pain    After treatment patient left in no apparent distress:   Supine in bed, Call bell within reach, Side rails x 3, and hob and knees raised 30 degrees. COMMUNICATION/EDUCATION:   The patients plan of care was discussed with: Occupational therapist, Registered nurse, Physician, and Rehabilitation technician. Fall prevention education was provided and the patient/caregiver indicated understanding., Patient/family have participated as able in goal setting and plan of care. , and Patient/family agree to work toward stated goals and plan of care.     Thank you for this referral.  Irma Olson, PT   Time Calculation: 41 mins

## 2021-03-21 ENCOUNTER — APPOINTMENT (OUTPATIENT)
Dept: CT IMAGING | Age: 76
DRG: 457 | End: 2021-03-21
Attending: NURSE PRACTITIONER
Payer: MEDICARE

## 2021-03-21 LAB
ANION GAP SERPL CALC-SCNC: 9 MMOL/L (ref 5–15)
BASOPHILS # BLD: 0 K/UL (ref 0–0.1)
BASOPHILS NFR BLD: 0 % (ref 0–1)
BUN SERPL-MCNC: 9 MG/DL (ref 6–20)
BUN/CREAT SERPL: 19 (ref 12–20)
CALCIUM SERPL-MCNC: 8.9 MG/DL (ref 8.5–10.1)
CHLORIDE SERPL-SCNC: 107 MMOL/L (ref 97–108)
CO2 SERPL-SCNC: 24 MMOL/L (ref 21–32)
CREAT SERPL-MCNC: 0.47 MG/DL (ref 0.55–1.02)
DIFFERENTIAL METHOD BLD: ABNORMAL
EOSINOPHIL # BLD: 0.1 K/UL (ref 0–0.4)
EOSINOPHIL NFR BLD: 1 % (ref 0–7)
ERYTHROCYTE [DISTWIDTH] IN BLOOD BY AUTOMATED COUNT: 15 % (ref 11.5–14.5)
GLUCOSE BLD STRIP.AUTO-MCNC: 126 MG/DL (ref 65–100)
GLUCOSE BLD STRIP.AUTO-MCNC: 127 MG/DL (ref 65–100)
GLUCOSE BLD STRIP.AUTO-MCNC: 128 MG/DL (ref 65–100)
GLUCOSE BLD STRIP.AUTO-MCNC: 133 MG/DL (ref 65–100)
GLUCOSE SERPL-MCNC: 107 MG/DL (ref 65–100)
HCT VFR BLD AUTO: 26.9 % (ref 35–47)
HGB BLD-MCNC: 8.7 G/DL (ref 11.5–16)
IMM GRANULOCYTES # BLD AUTO: 0 K/UL (ref 0–0.04)
IMM GRANULOCYTES NFR BLD AUTO: 0 % (ref 0–0.5)
LYMPHOCYTES # BLD: 1.2 K/UL (ref 0.8–3.5)
LYMPHOCYTES NFR BLD: 16 % (ref 12–49)
MCH RBC QN AUTO: 32.5 PG (ref 26–34)
MCHC RBC AUTO-ENTMCNC: 32.3 G/DL (ref 30–36.5)
MCV RBC AUTO: 100.4 FL (ref 80–99)
MONOCYTES # BLD: 0.7 K/UL (ref 0–1)
MONOCYTES NFR BLD: 9 % (ref 5–13)
NEUTS SEG # BLD: 5.6 K/UL (ref 1.8–8)
NEUTS SEG NFR BLD: 74 % (ref 32–75)
NRBC # BLD: 0 K/UL (ref 0–0.01)
NRBC BLD-RTO: 0 PER 100 WBC
PLATELET # BLD AUTO: 348 K/UL (ref 150–400)
PMV BLD AUTO: 9.1 FL (ref 8.9–12.9)
POTASSIUM SERPL-SCNC: 3.2 MMOL/L (ref 3.5–5.1)
PROCALCITONIN SERPL-MCNC: 0.09 NG/ML
RBC # BLD AUTO: 2.68 M/UL (ref 3.8–5.2)
SARS-COV-2, COV2: NORMAL
SERVICE CMNT-IMP: ABNORMAL
SODIUM SERPL-SCNC: 140 MMOL/L (ref 136–145)
WBC # BLD AUTO: 7.5 K/UL (ref 3.6–11)

## 2021-03-21 PROCEDURE — 36415 COLL VENOUS BLD VENIPUNCTURE: CPT

## 2021-03-21 PROCEDURE — 80048 BASIC METABOLIC PNL TOTAL CA: CPT

## 2021-03-21 PROCEDURE — 74011000250 HC RX REV CODE- 250: Performed by: NEUROLOGICAL SURGERY

## 2021-03-21 PROCEDURE — 77030038269 HC DRN EXT URIN PURWCK BARD -A

## 2021-03-21 PROCEDURE — 94760 N-INVAS EAR/PLS OXIMETRY 1: CPT

## 2021-03-21 PROCEDURE — 65270000029 HC RM PRIVATE

## 2021-03-21 PROCEDURE — 74011250637 HC RX REV CODE- 250/637: Performed by: NURSE PRACTITIONER

## 2021-03-21 PROCEDURE — 97116 GAIT TRAINING THERAPY: CPT

## 2021-03-21 PROCEDURE — 2709999900 HC NON-CHARGEABLE SUPPLY

## 2021-03-21 PROCEDURE — 71275 CT ANGIOGRAPHY CHEST: CPT

## 2021-03-21 PROCEDURE — 97530 THERAPEUTIC ACTIVITIES: CPT

## 2021-03-21 PROCEDURE — 74011000636 HC RX REV CODE- 636: Performed by: INTERNAL MEDICINE

## 2021-03-21 PROCEDURE — 74011250636 HC RX REV CODE- 250/636: Performed by: NEUROLOGICAL SURGERY

## 2021-03-21 PROCEDURE — 74011000250 HC RX REV CODE- 250: Performed by: NURSE PRACTITIONER

## 2021-03-21 PROCEDURE — U0005 INFEC AGEN DETEC AMPLI PROBE: HCPCS

## 2021-03-21 PROCEDURE — 74011250637 HC RX REV CODE- 250/637: Performed by: NEUROLOGICAL SURGERY

## 2021-03-21 PROCEDURE — 82962 GLUCOSE BLOOD TEST: CPT

## 2021-03-21 PROCEDURE — 84145 PROCALCITONIN (PCT): CPT

## 2021-03-21 PROCEDURE — 85025 COMPLETE CBC W/AUTO DIFF WBC: CPT

## 2021-03-21 PROCEDURE — 74011250636 HC RX REV CODE- 250/636: Performed by: NURSE PRACTITIONER

## 2021-03-21 RX ORDER — OXYCODONE HYDROCHLORIDE 5 MG/1
10 TABLET ORAL
Status: DISCONTINUED | OUTPATIENT
Start: 2021-03-21 | End: 2021-03-22

## 2021-03-21 RX ORDER — POTASSIUM CHLORIDE 20 MEQ/1
40 TABLET, EXTENDED RELEASE ORAL 2 TIMES DAILY
Status: COMPLETED | OUTPATIENT
Start: 2021-03-21 | End: 2021-03-21

## 2021-03-21 RX ORDER — OXYCODONE HYDROCHLORIDE 5 MG/1
5 TABLET ORAL
Status: DISCONTINUED | OUTPATIENT
Start: 2021-03-21 | End: 2021-03-22

## 2021-03-21 RX ORDER — DOCUSATE SODIUM 100 MG/1
100 CAPSULE, LIQUID FILLED ORAL 2 TIMES DAILY
Status: DISCONTINUED | OUTPATIENT
Start: 2021-03-21 | End: 2021-03-25 | Stop reason: HOSPADM

## 2021-03-21 RX ORDER — POLYETHYLENE GLYCOL 3350 17 G/17G
17 POWDER, FOR SOLUTION ORAL DAILY
Status: DISCONTINUED | OUTPATIENT
Start: 2021-03-21 | End: 2021-03-21 | Stop reason: SDUPTHER

## 2021-03-21 RX ORDER — POLYETHYLENE GLYCOL 3350 17 G/17G
17 POWDER, FOR SOLUTION ORAL DAILY
Status: DISCONTINUED | OUTPATIENT
Start: 2021-03-21 | End: 2021-03-25 | Stop reason: HOSPADM

## 2021-03-21 RX ADMIN — METOPROLOL TARTRATE 5 MG: 5 INJECTION INTRAVENOUS at 05:51

## 2021-03-21 RX ADMIN — OXYCODONE 5 MG: 5 TABLET ORAL at 12:25

## 2021-03-21 RX ADMIN — Medication 10 ML: at 06:07

## 2021-03-21 RX ADMIN — ENOXAPARIN SODIUM 40 MG: 40 INJECTION SUBCUTANEOUS at 14:12

## 2021-03-21 RX ADMIN — DOCUSATE SODIUM 100 MG: 100 CAPSULE, LIQUID FILLED ORAL at 09:16

## 2021-03-21 RX ADMIN — SODIUM CHLORIDE 75 ML/HR: 9 INJECTION, SOLUTION INTRAVENOUS at 06:05

## 2021-03-21 RX ADMIN — DIPHENHYDRAMINE HYDROCHLORIDE 25 MG: 25 CAPSULE ORAL at 00:36

## 2021-03-21 RX ADMIN — Medication 10 ML: at 21:21

## 2021-03-21 RX ADMIN — Medication 10 ML: at 14:12

## 2021-03-21 RX ADMIN — BACLOFEN 10 MG: 10 TABLET ORAL at 21:21

## 2021-03-21 RX ADMIN — Medication 1 AMPULE: at 20:47

## 2021-03-21 RX ADMIN — Medication 1 AMPULE: at 08:23

## 2021-03-21 RX ADMIN — BACLOFEN 10 MG: 10 TABLET ORAL at 15:44

## 2021-03-21 RX ADMIN — DOCUSATE SODIUM 100 MG: 100 CAPSULE, LIQUID FILLED ORAL at 17:00

## 2021-03-21 RX ADMIN — IOPAMIDOL 100 ML: 755 INJECTION, SOLUTION INTRAVENOUS at 16:41

## 2021-03-21 RX ADMIN — BACLOFEN 10 MG: 10 TABLET ORAL at 09:16

## 2021-03-21 RX ADMIN — CYCLOBENZAPRINE 10 MG: 10 TABLET, FILM COATED ORAL at 20:47

## 2021-03-21 RX ADMIN — POTASSIUM CHLORIDE 40 MEQ: 20 TABLET, EXTENDED RELEASE ORAL at 17:00

## 2021-03-21 RX ADMIN — POTASSIUM CHLORIDE 40 MEQ: 20 TABLET, EXTENDED RELEASE ORAL at 12:25

## 2021-03-21 RX ADMIN — POLYETHYLENE GLYCOL 3350 17 G: 17 POWDER, FOR SOLUTION ORAL at 12:25

## 2021-03-21 RX ADMIN — METOPROLOL TARTRATE 5 MG: 5 INJECTION INTRAVENOUS at 09:21

## 2021-03-21 NOTE — PROGRESS NOTES
RAPID RESPONSE TEAM- Follow Up    Rounded on patient due to recent rapid response. Discussed with primary RN, Marshall Puga. No acute concerns, VSS, MEWS 2. Patient Vitals for the past 12 hrs:   Temp Pulse Resp BP SpO2   03/21/21 0342 98.6 °F (37 °C) (!) 104 14 139/61 97 %   03/20/21 2336 98 °F (36.7 °C) (!) 101 16 (!) 151/69 99 %   03/20/21 2134 -- (!) 104 -- (!) 141/63 --   03/20/21 1953 98.2 °F (36.8 °C) (!) 109 18 132/61 96 %         No RRT interventions indicated at this time. Please call with any questions or concerns.      Teo Santiago  Rapid Response RN  [facility]

## 2021-03-21 NOTE — PROGRESS NOTES
Hospitalist Progress Note    NAME: Josef Garnett   :  1945   MRN:  [id number]     I reviewed pertinent labs and imaging, and discussed /agreed on the plan of care with Dr. Almas Hooks. Assessment / Plan:  Severe Lumbago   Severe Compression Deformity at T12   · CT Spine 3/17             1. Severe compression deformity at T12, which may be subacute or acute, with 5 mm associated retropulsion of bone at the superior endplate and central stenosis at T11-12. 2. Multilevel degenerative change otherwise with central and foraminal stenosis. 3. Left adrenal adenoma or cyst.             4. Generalized osteopenia with a single focal area of low density in the vertebral body of P00, of uncertain if any clinical significance. Correlate with clinical history. Further evaluation would require lumbar spine MRI. · MRI Thoracic Spine            1. Acute or subacute T12 compression deformity with retropulsion of bone toward the thecal sac, effacing the distal spinal cord. 2. Overall mild thoracic kyphosis and multilevel degenerative change with no other compression deformity.    · Appreciate Neurosurgery - S/p T10-L2 fusion, decompression of spinal cord at T12 and biopsy of T12 vertebral body POD #2   · Continue scheduled baclofen, lidocaine patch, PRN cyclobenzaprine, PRN oxycodone for pain and muscle spasms    · Increase frequency of stool softeners - No BM since admission   · Appreciate PT/OT evaluations     New Onset Tachycardia 3/20   Fever with Chills Overnight   Rule Out PE  · EKG ST and has been all night   · Afebrile now 24 hours   · Blood Cultures - NG so far    · Lactate WNL   · Procalcitonin NEGATIVE    · CXR - No acute cardiopulmonary process   · Check CTA to rule out PE    Hypokalemia  · K 3.2, replete with PO   · Recheck in AM     Hypertension   · Does not appear to take any anti-hypertensives  · BP elevated due to pain - monitor   · PRN hydralazine   · Monitor     Type II Diabetes  · History of DM but not on any coverage OP   · HgbA1c 5.3    S/p ORIF of Left Humerus Fracture x 5 years    New Limited ROM to Left Arm s/p Fall 2 weeks Ago   · XR Humerus/Forearm - Surgical fixation of left humerus fracture   · CXR - Left shoulder dislocation. Right clavicle and upper rib fractures, age indeterminate. · Appreciate Ortho Input - S/p closed reduction of should dislocation  · Follow up with [facility]   · Continue wrist brace   · PT/OT evaluations   · Patient reports some swelling to arm - dopplers NEGATIVE for DVT     18.5 - 24.9 Normal weight / Body mass index is 23.49 kg/m². Code status: Full  Prophylaxis: Lovenox   Recommended Disposition: HH vs SNF     Subjective:     Chief Complaint / Reason for Physician Visit  Patient seen at bedside, she stated she is feeling better this morning. Discussed plan of care and patient in agreement  Discussed with RN events overnight. Review of Systems:  Symptom Y/N Comments  Symptom Y/N Comments   Fever/Chills y   Chest Pain n    Poor Appetite n   Edema n    Cough n   Abdominal Pain n    Sputum n   Joint Pain n    SOB/COULTER n   Pruritis/Rash n    Nausea/vomit n   Tolerating PT/OT     Diarrhea n   Tolerating Diet y    Constipation n   Other y Back pain      Could NOT obtain due to:      Objective:     VITALS:   Last 24hrs VS reviewed since prior progress note.  Most recent are:  Patient Vitals for the past 24 hrs:   Temp Pulse Resp BP SpO2   03/21/21 0741 98.1 °F (36.7 °C) 98 14 (!) 171/73 97 %   03/21/21 0342 98.6 °F (37 °C) (!) 104 14 139/61 97 %   03/20/21 2336 98 °F (36.7 °C) (!) 101 16 (!) 151/69 99 %   03/20/21 2134 -- (!) 104 -- (!) 141/63 --   03/20/21 1953 98.2 °F (36.8 °C) (!) 109 18 132/61 96 %   03/20/21 1547 98.3 °F (36.8 °C) (!) 103 20 137/65 97 %   03/20/21 1240 -- (!) 109 -- (!) 152/66 --   03/20/21 1200 -- (!) 112 -- 130/60 --   03/20/21 1115 98.8 °F (37.1 °C) (!) 112 20 (!) 120/57 96 %       Intake/Output Summary (Last 24 hours) at 3/21/2021 1034  Last data filed at 3/21/2021 2261  Gross per 24 hour   Intake 2000 ml   Output 1125 ml   Net 875 ml        I had a face to face encounter and independently examined this patient on 3/21/2021, as outlined below:  PHYSICAL EXAM:  General: WD, WN. Alert, cooperative, elderly female in no acute distress  EENT:  EOMI. Anicteric sclerae. MMM  Resp:  CTA bilaterally, no wheezing or rales. No accessory muscle use  CV:  ST,  No edema  GI:  Soft, Non distended, Non tender. +Bowel sounds  Neurologic:  Alert and oriented X 3, normal speech,   Psych:   Good insight. Not anxious nor agitated  Skin:  No rashes. No jaundice    Reviewed most current lab test results and cultures  YES  Reviewed most current radiology test results   YES  Review and summation of old records today    NO  Reviewed patient's current orders and MAR    YES  PMH/ reviewed - no change compared to H&P  ________________________________________________________________________  Care Plan discussed with:    Comments   Patient x    Family      RN x    Care Manager     Consultant                        Multidiciplinary team rounds were held today with , nursing, pharmacist and clinical coordinator. Patient's plan of care was discussed; medications were reviewed and discharge planning was addressed. ________________________________________________________________________  Total NON critical care TIME:  25   Minutes    Total CRITICAL CARE TIME Spent:   Minutes non procedure based      Comments   >50% of visit spent in counseling and coordination of care x    ________________________________________________________________________  Lucy Massey NP     Procedures: see electronic medical records for all procedures/Xrays and details which were not copied into this note but were reviewed prior to creation of Plan. LABS:  I reviewed today's most current labs and imaging studies.   Pertinent labs include:  Recent Labs 03/21/21  0549 03/20/21  0954 03/20/21  0532   WBC 7.5 10.3  --    HGB 8.7* 9.8* 9.8*   HCT 26.9* 30.2*  --     437*  --      Recent Labs     03/21/21  0549 03/20/21  0532 03/18/21  1434    138  --    K 3.2* 4.1  --     109*  --    CO2 24 18*  --    * 125*  --    BUN 9 15  --    CREA 0.47* 0.47*  --    CA 8.9 9.1  --    INR  --   --  1.0       Signed: Madyson Dickey NP

## 2021-03-21 NOTE — PROGRESS NOTES
Reason for Admission: Back Pain; Compression Fracture of Body of Thoracic Vertebrae                    RUR Score: 11                   Plan for utilizing home health: PT/OT recommendations for IPR. FOC offered. EUNICE selected. Referral sent to [facility] via [employer], awaiting response. PCP: First and Last name:  Ayad Christiansen MD     Name of Practice:    Are you a current patient: Yes/No: Yes   Approximate date of last visit: \"A While Ago\"   Can you participate in a virtual visit with your PCP: Yes                    Current Advanced Directive/Advance Care Plan: Full Code - No ACP on file at this time. Single. No adult children. Reports NOK is Sister (Rosi Brooks, c: [phone], h: [phone]) lives in [place]. Healthcare Decision Maker: Rosi Brooks, c: [phone], h: [phone]  Click here to complete [address] including selection of the Healthcare Decision Maker Relationship (ie \"Primary\")                    Transition of Care Plan:      1) EUNICE (referral pending)  2) COVID-19 PCR test for placement  3) S EMTALA transport  4) Follow-up Care Appointments  5) 2nd IM Letter    78 YO White Female admitted on 3/17/21 for Back Pain, Compression Fracture of Body of Thoracic Vertebrae. Lives alone in [facility] ([place] [address]) with 2 cats. Unmarried. No Children. Sister is legal NOK (Rosi Brooks) that lives in [place]. Prior to injury, reports independent with ADLs/IADLs to include driving. Denies hx of HH, SNF, or IPR. Has Cane, Brace/splint, grab bars, shower chair, and RW at home. Preferred Rx is [facility] ([address]). Has Medicare A&B and BCBS. Demographic Info verified. Assessment completed. Met with pt at bedside. PT/OT evaluations recommending IPR placement. Pt agreeable. FOC offered. EUNICE selected. Referral sent to [facility] via [employer], awaiting response at this time.              **PATIENT WILL NEED COVID-19 PCR TEST ORDERED FOR PLACEMENT**    CM will continue to remain available to assist with dc planning    Care Management Interventions  PCP Verified by CM: Yes  Palliative Care Criteria Met (RRAT>21 & CHF Dx)?: No  Mode of Transport at Discharge: BLS  Transition of Care Consult (CM Consult): Discharge Planning  Discharge Durable Medical Equipment: No(Cane, Brace/splint, Grab Bars, Shower Chair, RW at home)  Health Maintenance Reviewed: Yes  Physical Therapy Consult: Yes  Occupational Therapy Consult: Yes  Speech Therapy Consult: No  Current Support Network: Lives Alone, Other(One-story house ([address]) with 2 cats;  Sister lives out of state)  Confirm Follow Up Transport: Self  The Plan for Transition of Care is Related to the Following Treatment Goals : IPR, Follow-up Care Appointments  The Patient and/or Patient Representative was Provided with a Choice of Provider and Agrees with the Discharge Plan?: Yes  Name of the Patient Representative Who was Provided with a Choice of Provider and Agrees with the Discharge Plan: Tulio Mederos (pt)  Freedom of Choice List was Provided with Basic Dialogue that Supports the Patient's Individualized Plan of Care/Goals, Treatment Preferences and Shares the Quality Data Associated with the Providers?: Yes  Discharge Location  Discharge Placement: Rehab hospital/unit acute    Abdon Whipple 29 Manager  [phone]

## 2021-03-21 NOTE — PROGRESS NOTES
End of Shift Note    Bedside shift change report given to [address] (oncoming nurse) by Khang Santiago RN (offgoing nurse). Report included the following information SBAR, Kardex, Intake/Output, MAR, Recent Results and Cardiac Rhythm nsr    Shift worked:  [id number]     Shift summary and any significant changes:     pt up to chair with 2 assist and the walker with chair alarm for lunch. Tolerating pills and food good. covid test done for placement. Prn oxycodone 5 mg given for pain post pt     Concerns for physician to address:  discharge     Zone phone for oncoming shift:   [phone]       Activity:  Activity Level: Up with Assistance  Number times ambulated in hallways past shift: 0  Number of times OOB to chair past shift: 1    Cardiac:   Cardiac Monitoring: Yes      Cardiac Rhythm: Normal sinus rhythm    Access:   Current line(s): PIV     Genitourinary:   Urinary status: voiding, incontinent and external catheter    Respiratory:   O2 Device: Room air  Chronic home O2 use?: NO  Incentive spirometer at bedside: YES     GI:  Last Bowel Movement Date: 03/17/21  Current diet:  DIET DIABETIC CONSISTENT CARB Regular  Passing flatus: YES  Tolerating current diet: YES       Pain Management:   Patient states pain is manageable on current regimen: YES    Skin:  Kwabena Score: 15  Interventions: turn team, float heels, increase time out of bed, PT/OT consult and internal/external urinary devices    Patient Safety:  Fall Score:  Total Score: 5  Interventions: bed/chair alarm, assistive device (walker, cane, etc), gripper socks, pt to call before getting OOB and stay with me (per policy)  High Fall Risk: Yes    Length of Stay:  Expected LOS: 2d 19h  Actual LOS: 4      Kim Talley, KAREN

## 2021-03-21 NOTE — PROGRESS NOTES
Problem: Falls - Risk of  Goal: *Absence of Falls  Description: Document Boby Quinn Fall Risk and appropriate interventions in the flowsheet.   Outcome: Progressing Towards Goal  Note: Fall Risk Interventions:  Mobility Interventions: Assess mobility with egress test, Communicate number of staff needed for ambulation/transfer, Bed/chair exit alarm, OT consult for ADLs, Patient to call before getting OOB, PT Consult for assist device competence, Strengthening exercises (ROM-active/passive), Utilize walker, cane, or other assistive device, PT Consult for mobility concerns, Utilize gait belt for transfers/ambulation    Mentation Interventions: Adequate sleep, hydration, pain control, Bed/chair exit alarm, Door open when patient unattended, Evaluate medications/consider consulting pharmacy, Eyeglasses and hearing aids, Familiar objects from home, Family/sitter at bedside, Gait belt with transfers/ambulation, Increase mobility, More frequent rounding, Reorient patient, Room close to nurse's station, Toileting rounds, Update white board    Medication Interventions: Assess postural VS orthostatic hypotension, Bed/chair exit alarm, Evaluate medications/consider consulting pharmacy, Patient to call before getting OOB, Teach patient to arise slowly, Utilize gait belt for transfers/ambulation    Elimination Interventions: Bed/chair exit alarm, Call light in reach, Elevated toilet seat, Patient to call for help with toileting needs, Stay With Me (per policy), Toilet paper/wipes in reach, Toileting schedule/hourly rounds    History of Falls Interventions: Bed/chair exit alarm, Consult care management for discharge planning, Door open when patient unattended, Evaluate medications/consider consulting pharmacy, Investigate reason for fall, Utilize gait belt for transfer/ambulation, Assess for delayed presentation/identification of injury for 48 hrs (comment for end date), Vital signs minimum Q4HRs X 24 hrs (comment for end date) Problem: Patient Education: Go to Patient Education Activity  Goal: Patient/Family Education  Outcome: Progressing Towards Goal     Problem: Pressure Injury - Risk of  Goal: *Prevention of pressure injury  Description: Document Kwabena Scale and appropriate interventions in the flowsheet. Outcome: Progressing Towards Goal  Note: Pressure Injury Interventions:  Sensory Interventions: Assess changes in LOC, Assess need for specialty bed, Avoid rigorous massage over bony prominences, Chair cushion, Check visual cues for pain, Discuss PT/OT consult with provider, Float heels, Keep linens dry and wrinkle-free, Maintain/enhance activity level, Minimize linen layers, Monitor skin under medical devices, Pad between skin to skin, Pressure redistribution bed/mattress (bed type), Turn and reposition approx. every two hours (pillows and wedges if needed), Use 30-degree side-lying position    Moisture Interventions: Absorbent underpads, Apply protective barrier, creams and emollients, Assess need for specialty bed, Check for incontinence Q2 hours and as needed, Contain wound drainage, Internal/External urinary devices, Limit adult briefs, Maintain skin hydration (lotion/cream), Minimize layers, Moisture barrier, Offer toileting Q_hr    Activity Interventions: Assess need for specialty bed, Chair cushion, Increase time out of bed, Pressure redistribution bed/mattress(bed type), PT/OT evaluation    Mobility Interventions: Assess need for specialty bed, Chair cushion, PT/OT evaluation, Pressure redistribution bed/mattress (bed type), HOB 30 degrees or less, Float heels, Turn and reposition approx.  every two hours(pillow and wedges)    Nutrition Interventions: Document food/fluid/supplement intake, Discuss nutritional consult with provider, Offer support with meals,snacks and hydration    Friction and Shear Interventions: Apply protective barrier, creams and emollients, HOB 30 degrees or less, Lift sheet, Foam dressings/transparent film/skin sealants, Feet elevated on foot rest, Lift team/patient mobility team, Minimize layers                Problem: Patient Education: Go to Patient Education Activity  Goal: Patient/Family Education  Outcome: Progressing Towards Goal     Problem: Patient Education: Go to Patient Education Activity  Goal: Patient/Family Education  Outcome: Progressing Towards Goal

## 2021-03-21 NOTE — PROGRESS NOTES
Problem: Mobility Impaired (Adult and Pediatric)  Goal: *Acute Goals and Plan of Care (Insert Text)  Description: FUNCTIONAL STATUS PRIOR TO ADMISSION: Patient required moderate assistance for basic and instrumental ADLs due to back pain. Her ability to walk and transfer progressively declined following a fall 2 weeks ago. Her LUE was non-functional due to pain following recent fall - poor ability to use it prior to that since falling in December fracturing her humerus - ORIF at [facility]. HOME SUPPORT PRIOR TO ADMISSION: The patient lived alone with paid caregiver to provide assistance during the day. POA lives outside the state. Home has no steps to enter. Physical Therapy Goals  Initiated 3/20/2021    1. Patient will move from supine to sit and sit to supine  and roll side to side in bed with moderate assistance  within 4 days. 2. Patient will perform sit to stand with moderate assistance x 2 within 4 days. 3. Patient will ambulate with moderate assistance x 2 for 15 feet with the least restrictive device within 4 days. 4. Patient will verbalize and demonstrate understanding of spinal precautions (No bending, lifting greater than 5 lbs, or twisting; log-roll technique; frequent repositioning as instructed) within 4 days.        Outcome: Progressing Towards Goal    .PHYSICAL THERAPY TREATMENT  Patient: Kenton Campos (32 y.o. female)  Date: 3/21/2021  Diagnosis: Back pain [M54.9]  Compression fracture of body of thoracic vertebra (HCC) [S22.000A] <principal problem not specified>  Procedure(s) (LRB):  T10- L2  PEDICLE SCREW MADISON FUSION WITH KYPHON CEMENT, DECOMPRESSION OF SPINAL CORD AT THORACIC TWELVE, THORACIC TWELVE LEFT AND RIGHT BONE BIOPSIES (N/A) 2 Days Post-Op  Precautions: Spinal, Skin, Fall, Other (comment)(TLSO needed - RN called [employer]) No bending, no lifting greater than 5 lbs, no twisting, log-roll technique, repositioning every 20-30 min except when sleeping, brace when OOB (if ordered)  Chart, physical therapy assessment, plan of care and goals were reviewed. ASSESSMENT  Patient continues with skilled PT services and is progressing towards goals. Ms. Vera Birmingham tolerated todays session well. Performed bed mobility with min A, transfers at mod-min A x 2, and was able to take a few steps with HHA at min A x 2 level. She continues to demonstrate generalized weakness, increased pain, decreased safety, gait dysfunction, and balance impairment. She will continue to benefit from skilled PT services to address deficits and facilitate safe return home. Current Level of Function Impacting Discharge (mobility/balance): min-mod A x 2    Other factors to consider for discharge: lives alone, paid caregiver         PLAN :  Patient continues to benefit from skilled intervention to address the above impairments. Continue treatment per established plan of care. to address goals. Recommendation for discharge: (in order for the patient to meet his/her long term goals)  To be determined: Post acute rehab in SNF vs IPR setting depending on tolerance for therapy and progress acutely    This discharge recommendation:  Has been made in collaboration with the attending provider and/or case management    IF patient discharges home will need the following DME: to be determined (TBD)       SUBJECTIVE:   Patient stated I am feeling better today I think.     OBJECTIVE DATA SUMMARY:   Critical Behavior:  Neurologic State: Alert, Confused  Orientation Level: Oriented to person, Oriented to place, Disoriented to situation, Disoriented to time  Cognition: Decreased attention/concentration, Decreased command following, Impaired decision making, Memory loss, Poor safety awareness  Safety/Judgement: Decreased awareness of environment, Fall prevention, Lack of insight into deficits    Spinal diagnosis intervention:  The patient stated 0/3 back precautions when prompted.  Reviewed all 3 back precautions, log roll technique, and sitting for 30 minutes at a time. Functional Mobility Training:    Bed Mobility:  Log    Supine to Sit: Minimum assistance  Light assist for trunk and cues needed for log roll technique. Sit to Supine: Minimum assistance  Patient impulsive with laying down, requires min A to perform log roll appropriately  Scooting: Minimum assistance  Level of Assistance: Minimum assistance     Transfers:  Sit to Stand: Moderate assistance;Assist x2  Cues needed for positioning. Assist for boost to stand and steadying upon rising. 1 trial from bed and 2 trials from chair  Stand to Sit: Moderate assistance;Assist x2  Assist for safe lower. 2 trials to chair, 1 trial to bed        Bed to Chair: Minimum assistance;Assist x2  Chair to Bed: Min A x2. HHA, cues for safety              Interventions: Tactile cues; Verbal cues;Manual cues; Safety awareness training  Level of Assistance: Minimum assistance;Assist x2  Balance:  Sitting: Intact; With support  Standing: Impaired  Standing - Static: Occasional  Standing - Dynamic : Poor  Ambulation/Gait Training:  Distance (ft): 3 Feet (ft)     Ambulation - Level of Assistance: Minimal assistance;Assist x2        Gait Abnormalities: Decreased step clearance; Path deviations; Shuffling gait        Base of Support: Narrowed        Step Length: Left shortened;Right shortened        Interventions: Manual cues; Safety awareness training; Tactile cues; Verbal cues         Patient performed with HHA and cues for safety. Did not ambulate further due to continued lack of TLSO. Pain Rating:  Does not rate but indicates pain in L shoulder at rest in bed and significant pain in back with mobility.      Activity Tolerance:   Fair and requires rest breaks    After treatment patient left in no apparent distress:   Supine in bed, Call bell within reach, Bed / chair alarm activated, and Side rails x 3    COMMUNICATION/COLLABORATION:   The patients plan of care was discussed with: Occupational therapist and Registered nurse. [facility]   Time Calculation: 20mins and 8 mins for a total of 28 minutes.

## 2021-03-21 NOTE — PROGRESS NOTES
End of Shift Note    Bedside shift change report given to KAREN Lopez (oncoming nurse) by Bernardino Martins RN (offgoing nurse). Report included the following information SBAR, Kardex, OR Summary, Procedure Summary, Intake/Output, MAR, Accordion, Recent Results and Cardiac Rhythm Stach    Shift worked:  7p-7a     Shift summary and any significant changes:     patient pleasantly confused overnight. No c/o pain. Concerns for physician to address:       Zone phone for oncoming shift:   [phone]       Activity:  Activity Level: Bed Rest  Number times ambulated in hallways past shift: 0  Number of times OOB to chair past shift: 0    Cardiac:   Cardiac Monitoring: Yes      Cardiac Rhythm: Sinus tachycardia    Access:   Current line(s): PIV     Genitourinary:   Urinary status: external catheter    Respiratory:   O2 Device: Room air  Chronic home O2 use?: NO  Incentive spirometer at bedside: NO     GI:  Last Bowel Movement Date: 03/17/21  Current diet:  DIET DIABETIC CONSISTENT CARB Regular  Passing flatus: YES  Tolerating current diet: YES       Pain Management:   Patient states pain is manageable on current regimen: YES    Skin:  Kwabena Score: 17  Interventions: increase time out of bed and PT/OT consult    Patient Safety:  Fall Score:  Total Score: 5  Interventions: bed/chair alarm, assistive device (walker, cane, etc) and pt to call before getting OOB  High Fall Risk: Yes    Length of Stay:  Expected LOS: 2d 19h  Actual LOS: 4      Edis Cohn RN

## 2021-03-22 ENCOUNTER — ANESTHESIA EVENT (OUTPATIENT)
Dept: SURGERY | Age: 76
DRG: 457 | End: 2021-03-22
Payer: MEDICARE

## 2021-03-22 LAB
ANION GAP SERPL CALC-SCNC: 6 MMOL/L (ref 5–15)
BUN SERPL-MCNC: 8 MG/DL (ref 6–20)
BUN/CREAT SERPL: 19 (ref 12–20)
CALCIUM SERPL-MCNC: 9 MG/DL (ref 8.5–10.1)
CHLORIDE SERPL-SCNC: 108 MMOL/L (ref 97–108)
CO2 SERPL-SCNC: 25 MMOL/L (ref 21–32)
CREAT SERPL-MCNC: 0.43 MG/DL (ref 0.55–1.02)
GLUCOSE BLD STRIP.AUTO-MCNC: 114 MG/DL (ref 65–100)
GLUCOSE BLD STRIP.AUTO-MCNC: 140 MG/DL (ref 65–100)
GLUCOSE BLD STRIP.AUTO-MCNC: 96 MG/DL (ref 65–100)
GLUCOSE SERPL-MCNC: 110 MG/DL (ref 65–100)
HCT VFR BLD AUTO: 25.5 % (ref 35–47)
HGB BLD-MCNC: 8.3 G/DL (ref 11.5–16)
POTASSIUM SERPL-SCNC: 4.3 MMOL/L (ref 3.5–5.1)
SARS-COV-2, XPLCVT: NOT DETECTED
SERVICE CMNT-IMP: ABNORMAL
SERVICE CMNT-IMP: ABNORMAL
SERVICE CMNT-IMP: NORMAL
SODIUM SERPL-SCNC: 139 MMOL/L (ref 136–145)
SOURCE, COVRS: NORMAL

## 2021-03-22 PROCEDURE — 85018 HEMOGLOBIN: CPT

## 2021-03-22 PROCEDURE — 74011250637 HC RX REV CODE- 250/637: Performed by: NEUROLOGICAL SURGERY

## 2021-03-22 PROCEDURE — 65270000029 HC RM PRIVATE

## 2021-03-22 PROCEDURE — 74011636637 HC RX REV CODE- 636/637: Performed by: NEUROLOGICAL SURGERY

## 2021-03-22 PROCEDURE — 74011000250 HC RX REV CODE- 250: Performed by: NEUROLOGICAL SURGERY

## 2021-03-22 PROCEDURE — 97530 THERAPEUTIC ACTIVITIES: CPT

## 2021-03-22 PROCEDURE — 82962 GLUCOSE BLOOD TEST: CPT

## 2021-03-22 PROCEDURE — 74011250637 HC RX REV CODE- 250/637: Performed by: NURSE PRACTITIONER

## 2021-03-22 PROCEDURE — 80048 BASIC METABOLIC PNL TOTAL CA: CPT

## 2021-03-22 PROCEDURE — 97530 THERAPEUTIC ACTIVITIES: CPT | Performed by: OCCUPATIONAL THERAPIST

## 2021-03-22 PROCEDURE — 36415 COLL VENOUS BLD VENIPUNCTURE: CPT

## 2021-03-22 RX ORDER — ACETAMINOPHEN 325 MG/1
650 TABLET ORAL EVERY 6 HOURS
Status: DISCONTINUED | OUTPATIENT
Start: 2021-03-22 | End: 2021-03-25 | Stop reason: HOSPADM

## 2021-03-22 RX ORDER — TRAMADOL HYDROCHLORIDE 50 MG/1
50 TABLET ORAL
Status: DISCONTINUED | OUTPATIENT
Start: 2021-03-22 | End: 2021-03-25 | Stop reason: HOSPADM

## 2021-03-22 RX ADMIN — Medication 10 ML: at 18:27

## 2021-03-22 RX ADMIN — ACETAMINOPHEN 650 MG: 325 TABLET, FILM COATED ORAL at 23:26

## 2021-03-22 RX ADMIN — BACLOFEN 10 MG: 10 TABLET ORAL at 21:52

## 2021-03-22 RX ADMIN — BACLOFEN 10 MG: 10 TABLET ORAL at 18:25

## 2021-03-22 RX ADMIN — OXYCODONE 5 MG: 5 TABLET ORAL at 08:29

## 2021-03-22 RX ADMIN — Medication 20 ML: at 06:00

## 2021-03-22 RX ADMIN — Medication 1 AMPULE: at 08:29

## 2021-03-22 RX ADMIN — ACETAMINOPHEN 650 MG: 325 TABLET, FILM COATED ORAL at 12:11

## 2021-03-22 RX ADMIN — BACLOFEN 10 MG: 10 TABLET ORAL at 08:17

## 2021-03-22 RX ADMIN — INSULIN LISPRO 2 UNITS: 100 INJECTION, SOLUTION INTRAVENOUS; SUBCUTANEOUS at 18:26

## 2021-03-22 RX ADMIN — ACETAMINOPHEN 650 MG: 325 TABLET, FILM COATED ORAL at 18:25

## 2021-03-22 RX ADMIN — Medication 10 ML: at 21:52

## 2021-03-22 RX ADMIN — DOCUSATE SODIUM 100 MG: 100 CAPSULE, LIQUID FILLED ORAL at 18:25

## 2021-03-22 RX ADMIN — Medication 1 AMPULE: at 20:32

## 2021-03-22 NOTE — PROGRESS NOTES
Hospitalist Progress Note    NAME: Alejandra Borja   :  1945   MRN:  [id number]     I reviewed pertinent labs and imaging, and discussed /agreed on the plan of care with Dr. Pollo Laurent. Assessment / Plan:  Severe Lumbago   Severe Compression Deformity at T12   · CT Spine 3/17             1. Severe compression deformity at T12, which may be subacute or acute, with 5 mm associated retropulsion of bone at the superior endplate and central stenosis at T11-12. 2. Multilevel degenerative change otherwise with central and foraminal stenosis. 3. Left adrenal adenoma or cyst.             4. Generalized osteopenia with a single focal area of low density in the vertebral body of Y79, of uncertain if any clinical significance. Correlate with clinical history. Further evaluation would require lumbar spine MRI. · MRI Thoracic Spine            1. Acute or subacute T12 compression deformity with retropulsion of bone toward the thecal sac, effacing the distal spinal cord. 2. Overall mild thoracic kyphosis and multilevel degenerative change with no other compression deformity.    · Appreciate Neurosurgery - S/p T10-L2 fusion, decompression of spinal cord at T12 and biopsy of T12 vertebral body POD #3  · Continue scheduled baclofen, lidocaine patch, PRN cyclobenzaprine, PRN oxycodone for pain and muscle spasms    · Continue bowel regime  · Appreciate PT/OT evaluations   · Appreciate CM - Accepted to [facility]   · Awaiting TLSO brace fitting   · COVID screening pending   · Plan to take to OR Tuesday for hemovac removal as hemovac broke when removal attempted today     Tachycardia - resolved    Fever with Chills post op - resolved   Ruled Out PE  · Afebrile now 24 hours   · Blood Cultures - NG so far    · Lactate WNL   · Procalcitonin NEGATIVE    · CXR - No acute cardiopulmonary process   · Check CTA negative for PE     Hypokalemia - resolved     Hypertension   · Does not appear to take any anti-hypertensives  · BP elevated at times due to pain - monitor   · PRN hydralazine     Type II Diabetes  · History of DM but not on any coverage OP   · HgbA1c 5.3    S/p ORIF of Left Humerus Fracture x 5 years    New Limited ROM to Left Arm s/p Fall 2 weeks Ago   · XR Humerus/Forearm - Surgical fixation of left humerus fracture   · CXR - Left shoulder dislocation. Right clavicle and upper rib fractures, age indeterminate. · Appreciate Ortho Input - S/p closed reduction of should dislocation  · Follow up with [facility]   · Continue wrist brace   · Patient reports some swelling to arm - dopplers NEGATIVE for DVT     18.5 - 24.9 Normal weight / Body mass index is 23.49 kg/m². Code status: Full  Prophylaxis: Lovenox   Recommended Disposition: HH vs SNF     Subjective:     Chief Complaint / Reason for Physician Visit  Patient seen at bedside, she stated she is feeling better this morning. She is somewhat confused this morning after administration of narcotics for pain. Discussed plan of care and patient in agreement  Discussed with RN events overnight. Review of Systems:  Symptom Y/N Comments  Symptom Y/N Comments   Fever/Chills n   Chest Pain n    Poor Appetite n   Edema n    Cough n   Abdominal Pain n    Sputum n   Joint Pain n    SOB/COULTER n   Pruritis/Rash n    Nausea/vomit n   Tolerating PT/OT     Diarrhea n   Tolerating Diet y    Constipation n   Other y Back pain      Could NOT obtain due to:      Objective:     VITALS:   Last 24hrs VS reviewed since prior progress note.  Most recent are:  Patient Vitals for the past 24 hrs:   Temp Pulse Resp BP SpO2   03/22/21 0733 98.1 °F (36.7 °C) 95 18 135/63 98 %   03/22/21 0513 97.9 °F (36.6 °C) 97 17 (!) 136/55 97 %   03/21/21 2259 97.5 °F (36.4 °C) (!) 101 16 135/63 96 %   03/21/21 1930 98.1 °F (36.7 °C) (!) 107 16 121/60 96 %   03/21/21 1539 97.9 °F (36.6 °C) (!) 107 14 139/65 97 %   03/21/21 1200 -- (!) 122 -- (!) 149/76 97 %   03/21/21 1129 98.2 °F (36.8 °C) (!) 105 14 (!) 150/69 94 %       Intake/Output Summary (Last 24 hours) at 3/22/2021 1059  Last data filed at 3/22/2021 0734  Gross per 24 hour   Intake 927.5 ml   Output 1545 ml   Net -617.5 ml        I had a face to face encounter and independently examined this patient on 3/22/2021, as outlined below:  PHYSICAL EXAM:  General: WD, WN. Alert, cooperative, elderly female in no acute distress  EENT:  EOMI. Anicteric sclerae. MMM  Resp:  CTA bilaterally, no wheezing or rales. No accessory muscle use  CV:  ST,  No edema  GI:  Soft, Non distended, Non tender. +Bowel sounds  Neurologic:  Alert and oriented X 2-3, normal speech,   Psych:   Good insight. Not anxious nor agitated  Skin:  No rashes. No jaundice    Reviewed most current lab test results and cultures  YES  Reviewed most current radiology test results   YES  Review and summation of old records today    NO  Reviewed patient's current orders and MAR    YES  PMH/ reviewed - no change compared to H&P  ________________________________________________________________________  Care Plan discussed with:    Comments   Patient x    Family      RN x    Care Manager     Consultant  x Neurosurgery                      Multidiciplinary team rounds were held today with , nursing, pharmacist and clinical coordinator. Patient's plan of care was discussed; medications were reviewed and discharge planning was addressed. ________________________________________________________________________  Total NON critical care TIME:  25   Minutes    Total CRITICAL CARE TIME Spent:   Minutes non procedure based      Comments   >50% of visit spent in counseling and coordination of care x    ________________________________________________________________________  Oval Money, NP     Procedures: see electronic medical records for all procedures/Xrays and details which were not copied into this note but were reviewed prior to creation of Plan.       LABS:  I reviewed today's most current labs and imaging studies.   Pertinent labs include:  Recent Labs     03/22/21  0511 03/21/21  0549 03/20/21  0954   WBC  --  7.5 10.3   HGB 8.3* 8.7* 9.8*   HCT 25.5* 26.9* 30.2*   PLT  --  348 437*     Recent Labs     03/22/21  0511 03/21/21  0549 03/20/21  0532    140 138   K 4.3 3.2* 4.1    107 109*   CO2 25 24 18*   * 107* 125*   BUN 8 9 15   CREA 0.43* 0.47* 0.47*   CA 9.0 8.9 9.1       Signed: Octaviano Bello NP

## 2021-03-22 NOTE — PROGRESS NOTES
POD3  afeb tachycardic - improving  hgb 8.3  CTA yesterday neg for PE  hemovac - 170cc yesterday  Back pain improved  Dressing C/D/I  No erythema  SHETTY  Good strength in LE  Sensation intact  S/P: T10-L2 fusion for T12 fracture with T111/T12 and T12/L1 decompression of thecal sac  hemovac drain broke when attempted to remove it  Will take to OR tomorrow 10:30am for wound exploration removal of drain (30 minutes)  Encourage PT  Brace ordered, should arrive Wednesday am

## 2021-03-22 NOTE — PROGRESS NOTES
Problem: Mobility Impaired (Adult and Pediatric)  Goal: *Acute Goals and Plan of Care (Insert Text)  Description: FUNCTIONAL STATUS PRIOR TO ADMISSION: Patient required moderate assistance for basic and instrumental ADLs due to back pain. Her ability to walk and transfer progressively declined following a fall 2 weeks ago. Her LUE was non-functional due to pain following recent fall - poor ability to use it prior to that since falling in December fracturing her humerus - ORIF at [facility]. HOME SUPPORT PRIOR TO ADMISSION: The patient lived alone with paid caregiver to provide assistance during the day. [place]A lives outside the state. Home has no steps to enter. Physical Therapy Goals  Initiated 3/20/2021    1. Patient will move from supine to sit and sit to supine  and roll side to side in bed with moderate assistance  within 4 days. 2. Patient will perform sit to stand with moderate assistance x 2 within 4 days. 3. Patient will ambulate with moderate assistance x 2 for 15 feet with the least restrictive device within 4 days. 4. Patient will verbalize and demonstrate understanding of spinal precautions (No bending, lifting greater than 5 lbs, or twisting; log-roll technique; frequent repositioning as instructed) within 4 days. Outcome: Progressing Towards Goal    PHYSICAL THERAPY TREATMENT  Patient: Veronica Joy (5 y.o. female)  Date: 3/22/2021  Diagnosis: Back pain [M54.9]  Compression fracture of body of thoracic vertebra ([address] [S22.000A] <principal problem not specified>  Procedure(s) (LRB):  T10- L2  PEDICLE SCREW MADISON FUSION WITH KYPHON CEMENT, DECOMPRESSION OF SPINAL CORD AT THORACIC TWELVE, THORACIC TWELVE LEFT AND RIGHT BONE BIOPSIES (N/A) 3 Days Post-Op  Precautions: Spinal, Skin, Fall, Other (comment)(TLSO needed - RN called [employer])  Chart, physical therapy assessment, plan of care and goals were reviewed.     ASSESSMENT  Patient continues with skilled PT services and is progressing towards goals. Pt was received in supine with drain still in place and cleared by nursing to mobilize. Still awaiting for brace to be fitting, but cleared to mobilize without. Cued sequencing to perform log roll at the EOB. Stood 2x with HHA with significant encouragement. She wanted to lay down immediately after sitting EOB. Was able to side step to [facility] with encouragement and verbal cues. She was returned to supine, refusing to attempt any further mobility. Left with OT. Current Level of Function Impacting Discharge (mobility/balance): min/mod A    Other factors to consider for discharge: poor pain control         PLAN :  Patient continues to benefit from skilled intervention to address the above impairments. Continue treatment per established plan of care. to address goals. Recommendation for discharge: (in order for the patient to meet his/her long term goals)  Therapy up to 5 days/week in SNF setting    This discharge recommendation:  Has been made in collaboration with the attending provider and/or case management    IF patient discharges home will need the following DME: to be determined (TBD)       SUBJECTIVE:   Patient stated please let me lay down.     OBJECTIVE DATA SUMMARY:   Critical Behavior:  Neurologic State: Alert, Confused  Orientation Level: Disoriented to situation, Disoriented to time, Oriented to person, Oriented to place  Cognition: Follows commands  Safety/Judgement: Decreased awareness of environment, Fall prevention, Lack of insight into deficits  Functional Mobility Training:  Bed Mobility:     Supine to Sit: Minimum assistance  Sit to Supine:  Moderate assistance     Level of Assistance: Minimum assistance     Transfers:  Sit to Stand: Minimum assistance;Assist x2  Stand to Sit: Minimum assistance;Assist x2                             Balance:  Sitting: Impaired  Sitting - Static: Good (unsupported)  Sitting - Dynamic: Fair (occasional)  Standing: Impaired  Standing - Static: Fair  Standing - Dynamic : Fair  Ambulation/Gait Training:      Side stepped to [facility] with HHA and CGA/min A          Pain Rating:  No numerical value provided    Activity Tolerance:   Fair    After treatment patient left in no apparent distress:   Supine in bed and Call bell within reach    COMMUNICATION/COLLABORATION:   The patients plan of care was discussed with: Occupational therapist and Registered nurse.      Ciro Reina, PT, DPT   Time Calculation: 23 mins

## 2021-03-22 NOTE — PROGRESS NOTES
Transition of Care Plan:  RUR: 11%  Disposition: SNF pending choice   Follow up appointments: ortho  DME needed: n/a  Transportation at Discharge: likely BLS  Ryder or means to access home:  n/a      IM Medicare letter: to be provided prior to d/c  Caregiver Contact: sisterMarnie [phone]  Discharge Caregiver contacted prior to discharge? 3:55pm  CM spoke with Dewayne Josue at [facility] who reported that she does not feel patient is a good candidate for their rehab facility as she feels patient will benefit from a longer rehab stay. Per Dewayne Josue [facility] is only able to provide patient with a 10-14 day stay. CM made room visit with patient to inform her of this information. CM provided patient with a SNF list and requested to have some choices by the morning. 10:00am  [facility] has accepted patient for inpatient rehab. COVID test is pending.      Mouna FigueroalesterLens, [address] [facility]

## 2021-03-22 NOTE — ANESTHESIA PREPROCEDURE EVALUATION
Relevant Problems   No relevant active problems       Anesthetic History   No history of anesthetic complications            Review of Systems / Medical History  Patient summary reviewed, nursing notes reviewed and pertinent labs reviewed    Pulmonary          Smoker      Comments: Former smoker   Neuro/Psych         Dementia    Comments: T 12 compression deformity with T 11-12 stenosis Cardiovascular    Hypertension: well controlled              Exercise tolerance: <4 METS     GI/Hepatic/Renal  Within defined limits              Endo/Other    Diabetes: well controlled, type 2    Arthritis     Other Findings            Physical Exam    Airway  Mallampati: II  TM Distance: 4 - 6 cm  Neck ROM: normal range of motion   Mouth opening: Normal     Cardiovascular  Regular rate and rhythm,  S1 and S2 normal,  no murmur, click, rub, or gallop  Rhythm: regular  Rate: normal         Dental    Dentition: Caps/crowns and Implants  Comments: Some missing, denies any loose   Pulmonary  Breath sounds clear to auscultation               Abdominal  GI exam deferred       Other Findings   Comments: Patient has a left anterior shoulder dislocation of unknown duration.          Anesthetic Plan    ASA: 3  Anesthesia type: general    Monitoring Plan: BIS      Induction: Intravenous  Anesthetic plan and risks discussed with: Patient      Conventional grade 1 view here 3/19/21

## 2021-03-22 NOTE — PROGRESS NOTES
Problem: Self Care Deficits Care Plan (Adult)  Goal: *Acute Goals and Plan of Care (Insert Text)  Description: FUNCTIONAL STATUS PRIOR TO ADMISSION: Independent with majority of ADL tasks. Was receiving assist with showering from paid caregiver. No longer drives. Sustained at least x2 falls since December 2020. HOME SUPPORT: Lived alone with her 2 cats. Recently hired paid caregiver who was assisting Pt with showering and IADL tasks a few times per week for a few hours starting in December 2020. Caregiver recently had to assist Pt daily \"with everything\" s/p recent fall d/t Pts back and LUE p! Caregiver also drives Pt to appointments. Occupational Therapy Goals  Initiated 3/20/2021    1. Patient will perform self-feeding with modified independence within 7 days. 2.  Patient will perform upper body dressing with Mod A using R non-dominant hand and adaptive strategies PRN within 7 days. 3.  Patient will lower body dressing with Mod A using R non-dominant hand and adaptive strategies PRN within 7 days. 4.  Patient will don/doff back brace with Mod A within 7 days. 5.  Patient will verbalize/demonstrate 3/3 back precautions during ADL tasks without cues within 7 days. Outcome: Not Progressing Towards Goal   OCCUPATIONAL THERAPY TREATMENT  Patient: Messi Roach (61 y.o. female)  Date: 3/22/2021  Diagnosis: Back pain [M54.9]  Compression fracture of body of thoracic vertebra ([address] [S22.000A] <principal problem not specified>  Procedure(s) (LRB):  T10- L2  PEDICLE SCREW MADISON FUSION WITH KYPHON CEMENT, DECOMPRESSION OF SPINAL CORD AT THORACIC TWELVE, THORACIC TWELVE LEFT AND RIGHT BONE BIOPSIES (N/A) 3 Days Post-Op  Precautions: Spinal, Skin, Fall, Other (comment)(TLSO needed - RN called [place])  noted L shoulder dislocation? Chart, occupational therapy assessment, plan of care, and goals were reviewed.     ASSESSMENT  Patient continues with skilled OT services and is not progressing towards goals. Pt was educated in log roll technique, requiring moderate assistance to perform. Pt sat EOB briefly, standing twice, then requesting return to bed. Poor tolerance for mobility this date due to discomfort. Pt declined further mobility. Per chart, pt will go to OR tomorrow to remove drain. Noted L shoulder impairment. Will benefit from rehab at discharge. Current Level of Function Impacting Discharge (ADLs): assist X2 for mobility--poor tolerance for adls and mobility    Other factors to consider for discharge: TLSO  brace per  orthotist pending         PLAN :  Patient continues to benefit from skilled intervention to address the above impairments. Continue treatment per established plan of care. to address goals. Recommend with staff: encourage upright and log roll      Recommendation for discharge: (in order for the patient to meet his/her long term goals)  Therapy up to 5 days/week in SNF setting    This discharge recommendation:  Has not yet been discussed the attending provider and/or case management    IF patient discharges home will need the following DME: tbd       SUBJECTIVE:   Patient stated that she did not want to sit up    OBJECTIVE DATA SUMMARY:   Cognitive/Behavioral Status:  Neurologic State: Alert;Confused  Orientation Level: Oriented to person  Cognition: Decreased attention/concentration; Follows commands; Impaired decision making; Impulsive  Perception: Appears intact     Safety/Judgement: Decreased awareness of environment;Decreased awareness of need for assistance;Decreased awareness of need for safety;Decreased insight into deficits; Fall prevention    Functional Mobility and Transfers for ADLs:  Bed Mobility:  Supine to Sit: Minimum assistance  Sit to Supine:  Moderate assistance    Transfers:  Sit to Stand: Minimum assistance;Assist x2  Functional Transfers  Bathroom Mobility: pt requested to return to bed(llimited mobility due to pt's poor tolerance)  Bed to Chair: pt declined requested back to bed  Balance:  Sitting: Impaired  Sitting - Static: Good (unsupported)  Sitting - Dynamic: Fair (occasional)  Standing: Impaired  Standing - Static: Fair  Standing - Dynamic : Fair    ADL Intervention:  Feeding  Feeding Assistance: Set-up  Food to Mouth: Independent                             Toileting  Bladder Hygiene: (using pure wick, protective brief)    Cognitive Retraining  Safety/Judgement: Decreased awareness of environment;Decreased awareness of need for assistance;Decreased awareness of need for safety;Decreased insight into deficits; Fall prevention      Pain:  Did not rate.       Activity Tolerance:   Poor and wished to be in bed    After treatment patient left in no apparent distress:   Supine in bed, HOB raised inprep for meal, Call bell within reach, and Side rails x 3    COMMUNICATION/COLLABORATION:   The patients plan of care was discussed with: Physical therapist.     Manjinder Borjas OTR/L  Time Calculation: 20 mins

## 2021-03-22 NOTE — PROGRESS NOTES
Ortho / Neurosurgery NP Note    POD# 3  s/p T10- L2  PEDICLE SCREW MADISON FUSION WITH KYPHON CEMENT, DECOMPRESSION OF SPINAL CORD AT THORACIC TWELVE, THORACIC TWELVE LEFT AND RIGHT BONE BIOPSIES   Pt seen with no visitor present. Pt resting in bed, in NAD. Stares off in room during conversation. AO to self, disoriented to time, situation, with multiple questions able to orient to place  Discussed confusion with hospitalist, felt most likely 2/2 narcotics and anesthesia - pain medication adjusted to oxycodone over the weekend. Assessment limited by confusion, not consistent in responses to pain and symptoms   Reports back pain and anterior thigh pain - state the thigh pain is much better since surgery. Fevers and tachycardia over the weekend have resolved. So far workup has been negative. VSS Afebrile. Room air.    Remote Tele: NSR     Visit Vitals  /63 (BP 1 Location: Right upper arm, BP Patient Position: At rest)   Pulse 95   Temp 98.1 °F (36.7 °C)   Resp 18   Ht 5' 7\" (1.702 m)   Wt 68 kg (150 lb)   SpO2 98%   BMI 23.49 kg/m²       Voiding status: purwick and brief in place   Output (mL)  Urine Voided: 500 ml (03/22/21 0734)  Last Bowel Movement Date: 03/17/21 (03/21/21 1129)  Unmeasurable Output  Urine Occurrence(s): 2 (03/21/21 1750)      Labs    Lab Results   Component Value Date/Time    HGB 8.3 (L) 03/22/2021 05:11 AM      Lab Results   Component Value Date/Time    INR 1.0 03/18/2021 02:34 PM      Lab Results   Component Value Date/Time    Sodium 139 03/22/2021 05:11 AM    Potassium 4.3 03/22/2021 05:11 AM    Chloride 108 03/22/2021 05:11 AM    CO2 25 03/22/2021 05:11 AM    Glucose 110 (H) 03/22/2021 05:11 AM    BUN 8 03/22/2021 05:11 AM    Creatinine 0.43 (L) 03/22/2021 05:11 AM    Calcium 9.0 03/22/2021 05:11 AM     Recent Glucose Results:   Lab Results   Component Value Date/Time     (H) 03/22/2021 05:11 AM    GLUCPOC 96 03/22/2021 07:33 AM    GLUCPOC 133 (H) 03/21/2021 09:20 PM [facility] 127 (H) 03/21/2021 03:43 PM           Body mass index is 23.49 kg/m². : A BMI > 30 is classified as obesity and > 40 is classified as morbid obesity. Optifoam dressing intact with scant sanguinous breakthrough drainage at left distal end of dressing   HV drain - charged   Calves soft and supple; No pain with passive stretch  Sensation and motor intact - PF/DF 4-/5, +EHL. Unable to lift lashanda legs off bed  SCDs for mechanical DVT proph while in bed     PLAN:  1) Neurovascular assessment q4 hours   2) PT/OT - to be fitted for TLSO today by JUAN PABLO Richardson. Therapy should not be held while awaiting brace. Recommendations for IP Rehab. [facility] accepted, pending COVID test.    3) Pain control -  prn oxycodone, currently tylenol is prn, will change to schedule; hopefully will aid in using less narcotics and improve mental status. 4) Readniess for discharge:     [x] Vital Signs stable    [x] Hgb stable    [x] + Voiding    [x] Wound intact, drainage minimal    [x] Tolerating PO intake     [] Cleared by PT (OT if applicable)     [] Stair training completed (if applicable)    [] Independent / Contact Guard Assist (household distance)     [] Bed mobility     [] Car transfers     [] ADLs    [x] Adequate pain control on oral medication alone     Remove HV today. [facility] placement pending COVID test.   Dr Jono Clifford will be in later this morning to see patient.      Brennon Nuñez NP

## 2021-03-22 NOTE — PROGRESS NOTES
Tried to call Baltazar Hines the pts sister to get consent because the patient is not consent able I called both numbers on file with no answer

## 2021-03-22 NOTE — PROGRESS NOTES
End of Shift Note    Bedside shift change report given to  (oncoming nurse) by Jaimee Byrd (offgoing nurse). Report included the following information SBAR, Kardex, Intake/Output, MAR, Recent Results and Med Rec Status    Shift worked:  0700 - 1930     Shift summary and any significant changes:     pt is supposed to be having an exploration for drain removal tomorrow but cant get a hold of family for consent     Concerns for physician to address:  consent     Zone phone for oncoming shift:   [phone]       Activity:  Activity Level: Up with Assistance  Number times ambulated in hallways past shift: 0  Number of times OOB to chair past shift: 0    Cardiac:   Cardiac Monitoring: Yes      Cardiac Rhythm: Sinus tachycardia    Access:   Current line(s): PIV     Genitourinary:   Urinary status: external catheter    Respiratory:   O2 Device: Room air  Chronic home O2 use?: NO  Incentive spirometer at bedside: NO     GI:  Last Bowel Movement Date: 03/17/21  Current diet:  DIET DIABETIC CONSISTENT CARB Regular  DIET NPO  Passing flatus: YES  Tolerating current diet: YES       Pain Management:   Patient states pain is manageable on current regimen: YES    Skin:  Kwabena Score: 15  Interventions: increase time out of bed    Patient Safety:  Fall Score:  Total Score: 5  Interventions: pt to call before getting OOB  High Fall Risk: Yes    Length of Stay:  Expected LOS: 5d 4h  Actual LOS: [address]

## 2021-03-23 ENCOUNTER — ANESTHESIA (OUTPATIENT)
Dept: SURGERY | Age: 76
DRG: 457 | End: 2021-03-23
Payer: MEDICARE

## 2021-03-23 LAB
ABO + RH BLD: NORMAL
BLOOD GROUP ANTIBODIES SERPL: NORMAL
GLUCOSE BLD STRIP.AUTO-MCNC: 114 MG/DL (ref 65–100)
GLUCOSE BLD STRIP.AUTO-MCNC: 117 MG/DL (ref 65–100)
GLUCOSE BLD STRIP.AUTO-MCNC: 122 MG/DL (ref 65–100)
GLUCOSE BLD STRIP.AUTO-MCNC: 143 MG/DL (ref 65–100)
GLUCOSE BLD STRIP.AUTO-MCNC: 153 MG/DL (ref 65–100)
SERVICE CMNT-IMP: ABNORMAL
SPECIMEN EXP DATE BLD: NORMAL

## 2021-03-23 PROCEDURE — 74011250636 HC RX REV CODE- 250/636: Performed by: ANESTHESIOLOGY

## 2021-03-23 PROCEDURE — 77030026438 HC STYL ET INTUB CARD -A: Performed by: NURSE ANESTHETIST, CERTIFIED REGISTERED

## 2021-03-23 PROCEDURE — 86901 BLOOD TYPING SEROLOGIC RH(D): CPT

## 2021-03-23 PROCEDURE — 74011250636 HC RX REV CODE- 250/636: Performed by: NEUROLOGICAL SURGERY

## 2021-03-23 PROCEDURE — 76210000016 HC OR PH I REC 1 TO 1.5 HR: Performed by: NEUROLOGICAL SURGERY

## 2021-03-23 PROCEDURE — 77030010507 HC ADH SKN DERMBND J&J -B: Performed by: NEUROLOGICAL SURGERY

## 2021-03-23 PROCEDURE — 77030014650 HC SEAL MTRX FLOSEL BAXT -C: Performed by: NEUROLOGICAL SURGERY

## 2021-03-23 PROCEDURE — 74011636637 HC RX REV CODE- 636/637: Performed by: NEUROLOGICAL SURGERY

## 2021-03-23 PROCEDURE — 74011000250 HC RX REV CODE- 250: Performed by: NEUROLOGICAL SURGERY

## 2021-03-23 PROCEDURE — 77030011266 HC ELECTRD BLD INSL COVD -A: Performed by: NEUROLOGICAL SURGERY

## 2021-03-23 PROCEDURE — 94760 N-INVAS EAR/PLS OXIMETRY 1: CPT

## 2021-03-23 PROCEDURE — 76010000161 HC OR TIME 1 TO 1.5 HR INTENSV-TIER 1: Performed by: NEUROLOGICAL SURGERY

## 2021-03-23 PROCEDURE — 74011000250 HC RX REV CODE- 250: Performed by: NURSE ANESTHETIST, CERTIFIED REGISTERED

## 2021-03-23 PROCEDURE — 0KPX00Z REMOVAL OF DRAINAGE DEVICE FROM UPPER MUSCLE, OPEN APPROACH: ICD-10-PCS | Performed by: NEUROLOGICAL SURGERY

## 2021-03-23 PROCEDURE — 76060000033 HC ANESTHESIA 1 TO 1.5 HR: Performed by: NEUROLOGICAL SURGERY

## 2021-03-23 PROCEDURE — 77030019908 HC STETH ESOPH SIMS -A: Performed by: NURSE ANESTHETIST, CERTIFIED REGISTERED

## 2021-03-23 PROCEDURE — 77030040361 HC SLV COMPR DVT MDII -B: Performed by: NEUROLOGICAL SURGERY

## 2021-03-23 PROCEDURE — 36415 COLL VENOUS BLD VENIPUNCTURE: CPT

## 2021-03-23 PROCEDURE — 74011250636 HC RX REV CODE- 250/636: Performed by: NURSE ANESTHETIST, CERTIFIED REGISTERED

## 2021-03-23 PROCEDURE — 74011250637 HC RX REV CODE- 250/637: Performed by: NURSE PRACTITIONER

## 2021-03-23 PROCEDURE — 74011250637 HC RX REV CODE- 250/637: Performed by: NEUROLOGICAL SURGERY

## 2021-03-23 PROCEDURE — 2709999900 HC NON-CHARGEABLE SUPPLY: Performed by: NEUROLOGICAL SURGERY

## 2021-03-23 PROCEDURE — 77030014647 HC SEAL FBRN TISSL BAXT -D: Performed by: NEUROLOGICAL SURGERY

## 2021-03-23 PROCEDURE — 74011250636 HC RX REV CODE- 250/636: Performed by: INTERNAL MEDICINE

## 2021-03-23 PROCEDURE — 77030029099 HC BN WAX SSPC -A: Performed by: NEUROLOGICAL SURGERY

## 2021-03-23 PROCEDURE — 77030013079 HC BLNKT BAIR HGGR 3M -A: Performed by: NURSE ANESTHETIST, CERTIFIED REGISTERED

## 2021-03-23 PROCEDURE — 77030003028 HC SUT VCRL J&J -A: Performed by: NEUROLOGICAL SURGERY

## 2021-03-23 PROCEDURE — 77030031139 HC SUT VCRL2 J&J -A: Performed by: NEUROLOGICAL SURGERY

## 2021-03-23 PROCEDURE — 77030008684 HC TU ET CUF COVD -B: Performed by: NURSE ANESTHETIST, CERTIFIED REGISTERED

## 2021-03-23 PROCEDURE — 77030002933 HC SUT MCRYL J&J -A: Performed by: NEUROLOGICAL SURGERY

## 2021-03-23 PROCEDURE — 77030002916 HC SUT ETHLN J&J -A: Performed by: NEUROLOGICAL SURGERY

## 2021-03-23 PROCEDURE — 65270000029 HC RM PRIVATE

## 2021-03-23 PROCEDURE — 77030038692 HC WND DEB SYS IRMX -B: Performed by: NEUROLOGICAL SURGERY

## 2021-03-23 PROCEDURE — 82962 GLUCOSE BLOOD TEST: CPT

## 2021-03-23 RX ORDER — SODIUM CHLORIDE 0.9 % (FLUSH) 0.9 %
5-40 SYRINGE (ML) INJECTION AS NEEDED
Status: DISCONTINUED | OUTPATIENT
Start: 2021-03-23 | End: 2021-03-23 | Stop reason: HOSPADM

## 2021-03-23 RX ORDER — SODIUM CHLORIDE 0.9 % (FLUSH) 0.9 %
5-40 SYRINGE (ML) INJECTION EVERY 8 HOURS
Status: DISCONTINUED | OUTPATIENT
Start: 2021-03-23 | End: 2021-03-23 | Stop reason: HOSPADM

## 2021-03-23 RX ORDER — HYDROMORPHONE HYDROCHLORIDE 2 MG/ML
INJECTION, SOLUTION INTRAMUSCULAR; INTRAVENOUS; SUBCUTANEOUS AS NEEDED
Status: DISCONTINUED | OUTPATIENT
Start: 2021-03-23 | End: 2021-03-23 | Stop reason: HOSPADM

## 2021-03-23 RX ORDER — DEXAMETHASONE SODIUM PHOSPHATE 4 MG/ML
INJECTION, SOLUTION INTRA-ARTICULAR; INTRALESIONAL; INTRAMUSCULAR; INTRAVENOUS; SOFT TISSUE AS NEEDED
Status: DISCONTINUED | OUTPATIENT
Start: 2021-03-23 | End: 2021-03-23 | Stop reason: HOSPADM

## 2021-03-23 RX ORDER — SODIUM CHLORIDE, SODIUM LACTATE, POTASSIUM CHLORIDE, CALCIUM CHLORIDE 600; 310; 30; 20 MG/100ML; MG/100ML; MG/100ML; MG/100ML
25 INJECTION, SOLUTION INTRAVENOUS CONTINUOUS
Status: DISCONTINUED | OUTPATIENT
Start: 2021-03-23 | End: 2021-03-23 | Stop reason: HOSPADM

## 2021-03-23 RX ORDER — ROCURONIUM BROMIDE 10 MG/ML
INJECTION, SOLUTION INTRAVENOUS AS NEEDED
Status: DISCONTINUED | OUTPATIENT
Start: 2021-03-23 | End: 2021-03-23 | Stop reason: HOSPADM

## 2021-03-23 RX ORDER — ONDANSETRON 2 MG/ML
4 INJECTION INTRAMUSCULAR; INTRAVENOUS AS NEEDED
Status: DISCONTINUED | OUTPATIENT
Start: 2021-03-23 | End: 2021-03-23 | Stop reason: HOSPADM

## 2021-03-23 RX ORDER — LIDOCAINE HYDROCHLORIDE 10 MG/ML
0.1 INJECTION, SOLUTION EPIDURAL; INFILTRATION; INTRACAUDAL; PERINEURAL AS NEEDED
Status: DISCONTINUED | OUTPATIENT
Start: 2021-03-23 | End: 2021-03-23 | Stop reason: HOSPADM

## 2021-03-23 RX ORDER — PHENYLEPHRINE HCL IN 0.9% NACL 0.4MG/10ML
SYRINGE (ML) INTRAVENOUS AS NEEDED
Status: DISCONTINUED | OUTPATIENT
Start: 2021-03-23 | End: 2021-03-23 | Stop reason: HOSPADM

## 2021-03-23 RX ORDER — PROPOFOL 10 MG/ML
INJECTION, EMULSION INTRAVENOUS AS NEEDED
Status: DISCONTINUED | OUTPATIENT
Start: 2021-03-23 | End: 2021-03-23 | Stop reason: HOSPADM

## 2021-03-23 RX ORDER — DIPHENHYDRAMINE HYDROCHLORIDE 50 MG/ML
12.5 INJECTION, SOLUTION INTRAMUSCULAR; INTRAVENOUS
Status: DISCONTINUED | OUTPATIENT
Start: 2021-03-23 | End: 2021-03-23 | Stop reason: HOSPADM

## 2021-03-23 RX ORDER — ONDANSETRON 2 MG/ML
INJECTION INTRAMUSCULAR; INTRAVENOUS AS NEEDED
Status: DISCONTINUED | OUTPATIENT
Start: 2021-03-23 | End: 2021-03-23 | Stop reason: HOSPADM

## 2021-03-23 RX ORDER — LIDOCAINE HYDROCHLORIDE 20 MG/ML
INJECTION, SOLUTION EPIDURAL; INFILTRATION; INTRACAUDAL; PERINEURAL AS NEEDED
Status: DISCONTINUED | OUTPATIENT
Start: 2021-03-23 | End: 2021-03-23 | Stop reason: HOSPADM

## 2021-03-23 RX ORDER — MORPHINE SULFATE 2 MG/ML
2 INJECTION, SOLUTION INTRAMUSCULAR; INTRAVENOUS
Status: DISCONTINUED | OUTPATIENT
Start: 2021-03-23 | End: 2021-03-23 | Stop reason: HOSPADM

## 2021-03-23 RX ORDER — FENTANYL CITRATE 50 UG/ML
INJECTION, SOLUTION INTRAMUSCULAR; INTRAVENOUS AS NEEDED
Status: DISCONTINUED | OUTPATIENT
Start: 2021-03-23 | End: 2021-03-23 | Stop reason: HOSPADM

## 2021-03-23 RX ORDER — SUCCINYLCHOLINE CHLORIDE 20 MG/ML
INJECTION INTRAMUSCULAR; INTRAVENOUS AS NEEDED
Status: DISCONTINUED | OUTPATIENT
Start: 2021-03-23 | End: 2021-03-23 | Stop reason: HOSPADM

## 2021-03-23 RX ORDER — FENTANYL CITRATE 50 UG/ML
25 INJECTION, SOLUTION INTRAMUSCULAR; INTRAVENOUS
Status: DISCONTINUED | OUTPATIENT
Start: 2021-03-23 | End: 2021-03-23 | Stop reason: HOSPADM

## 2021-03-23 RX ADMIN — DOCUSATE SODIUM 100 MG: 100 CAPSULE, LIQUID FILLED ORAL at 17:20

## 2021-03-23 RX ADMIN — FENTANYL CITRATE 50 MCG: 50 INJECTION, SOLUTION INTRAMUSCULAR; INTRAVENOUS at 11:17

## 2021-03-23 RX ADMIN — ACETAMINOPHEN 650 MG: 325 TABLET, FILM COATED ORAL at 05:46

## 2021-03-23 RX ADMIN — INSULIN LISPRO 2 UNITS: 100 INJECTION, SOLUTION INTRAVENOUS; SUBCUTANEOUS at 17:20

## 2021-03-23 RX ADMIN — Medication 10 ML: at 22:18

## 2021-03-23 RX ADMIN — Medication 1 AMPULE: at 22:22

## 2021-03-23 RX ADMIN — SODIUM CHLORIDE, POTASSIUM CHLORIDE, SODIUM LACTATE AND CALCIUM CHLORIDE 25 ML/HR: 600; 310; 30; 20 INJECTION, SOLUTION INTRAVENOUS at 10:09

## 2021-03-23 RX ADMIN — Medication 40 MCG: at 11:04

## 2021-03-23 RX ADMIN — Medication 80 MCG: at 11:00

## 2021-03-23 RX ADMIN — DEXAMETHASONE SODIUM PHOSPHATE 8 MG: 4 INJECTION, SOLUTION INTRAMUSCULAR; INTRAVENOUS at 10:45

## 2021-03-23 RX ADMIN — Medication 3 AMPULE: at 10:10

## 2021-03-23 RX ADMIN — SODIUM CHLORIDE 40 MCG/MIN: 900 INJECTION, SOLUTION INTRAVENOUS at 11:05

## 2021-03-23 RX ADMIN — VANCOMYCIN HYDROCHLORIDE 1000 MG: 1 INJECTION, POWDER, LYOPHILIZED, FOR SOLUTION INTRAVENOUS at 22:24

## 2021-03-23 RX ADMIN — DOCUSATE SODIUM 100 MG: 100 CAPSULE, LIQUID FILLED ORAL at 08:13

## 2021-03-23 RX ADMIN — PROPOFOL 150 MG: 10 INJECTION, EMULSION INTRAVENOUS at 10:39

## 2021-03-23 RX ADMIN — ACETAMINOPHEN 650 MG: 325 TABLET, FILM COATED ORAL at 13:44

## 2021-03-23 RX ADMIN — Medication 10 ML: at 05:46

## 2021-03-23 RX ADMIN — SUCCINYLCHOLINE CHLORIDE 160 MG: 20 INJECTION, SOLUTION INTRAMUSCULAR; INTRAVENOUS at 10:39

## 2021-03-23 RX ADMIN — HYDRALAZINE HYDROCHLORIDE 10 MG: 20 INJECTION INTRAMUSCULAR; INTRAVENOUS at 14:10

## 2021-03-23 RX ADMIN — HYDROMORPHONE HYDROCHLORIDE 1 MG: 2 INJECTION, SOLUTION INTRAMUSCULAR; INTRAVENOUS; SUBCUTANEOUS at 11:35

## 2021-03-23 RX ADMIN — ONDANSETRON HYDROCHLORIDE 4 MG: 2 INJECTION, SOLUTION INTRAMUSCULAR; INTRAVENOUS at 11:35

## 2021-03-23 RX ADMIN — FENTANYL CITRATE 25 MCG: 50 INJECTION, SOLUTION INTRAMUSCULAR; INTRAVENOUS at 10:39

## 2021-03-23 RX ADMIN — LIDOCAINE HYDROCHLORIDE 80 MG: 20 INJECTION, SOLUTION EPIDURAL; INFILTRATION; INTRACAUDAL; PERINEURAL at 10:39

## 2021-03-23 RX ADMIN — ACETAMINOPHEN 650 MG: 325 TABLET, FILM COATED ORAL at 23:24

## 2021-03-23 RX ADMIN — Medication 10 ML: at 17:21

## 2021-03-23 RX ADMIN — ROCURONIUM BROMIDE 5 MG: 10 INJECTION INTRAVENOUS at 10:39

## 2021-03-23 RX ADMIN — Medication 80 MCG: at 10:44

## 2021-03-23 RX ADMIN — BACLOFEN 10 MG: 10 TABLET ORAL at 08:13

## 2021-03-23 RX ADMIN — FENTANYL CITRATE 25 MCG: 50 INJECTION, SOLUTION INTRAMUSCULAR; INTRAVENOUS at 10:53

## 2021-03-23 RX ADMIN — VANCOMYCIN HYDROCHLORIDE 1000 MG: 1 INJECTION, POWDER, LYOPHILIZED, FOR SOLUTION INTRAVENOUS at 10:29

## 2021-03-23 RX ADMIN — Medication 1 AMPULE: at 08:13

## 2021-03-23 NOTE — PERIOP NOTES
Irrisept Wound Debridement and Cleansing System  Ref: [facility]: [id number] LOT: [id number] Expiration Date: 10/31/2022

## 2021-03-23 NOTE — PROGRESS NOTES
Speech pathology note  Orders received for cognitive evaluation. Given patient is POD #0 for wound exploration and removal of drain and received dilaudid, fentanyl, and propofol for surgery (which could all impact cognitive status) and is POD #4 for T10-L2 fusion, decompression of spinal cord at T12 and biopsy of T12 vertebral body with confusion after administration of narcotics, will hold cognitive evaluation today and follow up tomorrow as medically appropriate. Thank you.     Yakov Cagle., CCC-SLP

## 2021-03-23 NOTE — PROGRESS NOTES
Hospitalist Progress Note    NAME: Lisa Trevino   :  1945   MRN:  [id number]       Assessment / Plan:    Severe Lumbago   Severe Compression Deformity at T12   CT Spine 3/17             1. Severe compression deformity at T12, which may be subacute or acute, with 5 mm associated retropulsion of bone at the superior endplate and central stenosis at T11-12. 2. Multilevel degenerative change otherwise with central and foraminal stenosis. 3. Left adrenal adenoma or cyst.             4. Generalized osteopenia with a single focal area of low density in the vertebral body of X76, of uncertain if any clinical significance. Correlate with clinical history. Further evaluation would require lumbar spine MRI. MRI Thoracic Spine            1. Acute or subacute T12 compression deformity with retropulsion of bone toward the thecal sac, effacing the distal spinal cord. 2. Overall mild thoracic kyphosis and multilevel degenerative change with no other compression deformity. Appreciate Neurosurgery - S/p T10-L2 fusion, decompression of spinal cord at T12 and biopsy of T12 vertebral body POD #3  S/P REMOVAL OF DRAIN 03  Continue  PRN cyclobenzaprine  Continue bowel regime  Appreciate PT/OT evaluations   Appreciate CM - Accepted to [facility]   Awaiting TLSO brace fitting   COVID screening COMPLETED    One dose on Vancomycin.  I discussed with Dr Jeannie Carter, input is appreciated     Tachycardia - resolved    Fever with Chills post op - resolved   Ruled Out PE  Afebrile now 48 hours   Blood Cultures - NG so far    Lactate WNL   Procalcitonin NEGATIVE    CXR - No acute cardiopulmonary process   CTA negative for PE        Hypokalemia - resolved      Hypertension   Does not appear to take any anti-hypertensives  BP elevated at times due to pain - monitor   PRN hydralazine      Type II Diabetes  History of DM but not on any coverage OP   HgbA1c 5.3%     S/p ORIF of Left Humerus Fracture x 5 years New Limited ROM to Left Arm s/p Fall 2 weeks Ago   XR Humerus/Forearm - Surgical fixation of left humerus fracture   CXR - Left shoulder dislocation. Right clavicle and upper rib fractures, age indeterminate. Appreciate Ortho Input - S/p closed reduction of should dislocation  Follow up with [facility]   Continue wrist brace   Patient reports some swelling to arm - dopplers NEGATIVE for DVT        18.5 - 24.9 Normal weight / Body mass index is 23.49 kg/m². Code status: Full  Prophylaxis: Lovenox  Recommended Disposition: TBD     Subjective:     Chief Complaint / Reason for Physician Visit  Discussed with RN events overnight. Patient was seen and examined. No acute events overnight. \"feeling tired\"    Review of Systems:  Symptom Y/N Comments  Symptom Y/N Comments   Fever/Chills n   Chest Pain n    Poor Appetite    Edema     Cough n   Abdominal Pain n    Sputum    Joint Pain     SOB/COULTER n   Pruritis/Rash     Nausea/vomit n   Tolerating PT/OT     Diarrhea    Tolerating Diet y    Constipation    Other       Could NOT obtain due to:          Objective:     VITALS:   Last 24hrs VS reviewed since prior progress note.  Most recent are:  Patient Vitals for the past 24 hrs:   Temp Pulse Resp BP SpO2   03/23/21 1406 97.7 °F (36.5 °C) 84 14 (!) 182/81 95 %   03/23/21 1321 97.8 °F (36.6 °C) 84 13 (!) 169/80 95 %   03/23/21 1300 -- 79 13 (!) 151/64 94 %   03/23/21 1245 97.7 °F (36.5 °C) 78 13 (!) 160/68 95 %   03/23/21 1230 -- 80 22 (!) 168/76 100 %   03/23/21 1215 -- 83 13 (!) 157/70 100 %   03/23/21 1210 -- 79 12 (!) 148/65 100 %   03/23/21 1205 -- 85 24 (!) 168/69 100 %   03/23/21 1200 -- 87 22 (!) 165/86 100 %   03/23/21 1159 97.7 °F (36.5 °C) 87 20 (!) 157/70 100 %   03/23/21 1155 -- 84 15 (!) 157/70 100 %   03/23/21 0944 98.3 °F (36.8 °C) 92 22 (!) 149/75 99 %   03/23/21 0744 98.3 °F (36.8 °C) 85 18 (!) 145/65 100 %   03/23/21 0337 97.6 °F (36.4 °C) 85 20 (!) 130/53 100 %   03/22/21 2324 98 °F (36.7 °C) 98 20 (!) 122/52 98 %   03/22/21 2030 97.9 °F (36.6 °C) 90 20 136/67 97 %   03/22/21 1444 97.8 °F (36.6 °C) 100 19 132/62 99 %       Intake/Output Summary (Last 24 hours) at 3/23/2021 1410  Last data filed at 3/23/2021 1151  Gross per 24 hour   Intake 650 ml   Output 1600 ml   Net -950 ml        I had a face to face encounter and independently examined this patient on 3/23/2021, as outlined below:  PHYSICAL EXAM:  General: WD, WN. Alert, cooperative, no acute distress    EENT:  EOMI. Anicteric sclerae. MMM  Resp:  CTA bilaterally, no wheezing or rales. No accessory muscle use  CV:  Regular  rhythm,  No edema  GI:  Soft, Non distended, Non tender. +Bowel sounds  Neurologic:  Alert and oriented X 3, normal speech,   Psych:   Good insight. Not anxious nor agitated  Skin:  No rashes. No jaundice    Reviewed most current lab test results and cultures  YES  Reviewed most current radiology test results   YES  Review and summation of old records today    NO  Reviewed patient's current orders and MAR    YES  PMH/ reviewed - no change compared to H&P  ________________________________________________________________________  Care Plan discussed with:    Comments   Patient x    Family      RN x    Care Manager     Consultant  x Dr Dell Sharp team rounds were held today with , nursing, pharmacist and clinical coordinator. Patient's plan of care was discussed; medications were reviewed and discharge planning was addressed.      ________________________________________________________________________  Total NON critical care TIME:  35   Minutes    Total CRITICAL CARE TIME Spent:   Minutes non procedure based      Comments   >50% of visit spent in counseling and coordination of care     ________________________________________________________________________  Nathalie Borja MD     Procedures: see electronic medical records for all procedures/Xrays and details which were not copied into this note but were reviewed prior to creation of Plan. LABS:  I reviewed today's most current labs and imaging studies.   Pertinent labs include:  Recent Labs     03/22/21  0511 03/21/21  0549   WBC  --  7.5   HGB 8.3* 8.7*   HCT 25.5* 26.9*   PLT  --  348     Recent Labs     03/22/21  0511 03/21/21  0549    140   K 4.3 3.2*    107   CO2 25 24   * 107*   BUN 8 9   CREA 0.43* 0.47*   CA 9.0 8.9       Signed: Anand Campos MD

## 2021-03-23 NOTE — PROGRESS NOTES
End of Shift Note    Bedside shift change report given to [address] (oncoming nurse) by Lolis Pena (offgoing nurse). Report included the following information SBAR, Kardex, Intake/Output, MAR and Recent Results    Shift worked:  7p-7a     Shift summary and any significant changes:    Surgery prep is complete     Concerns for physician to address:       Zone phone for oncoming shift:          Activity:  Activity Level: Up with Assistance  Number times ambulated in hallways past shift: 0  Number of times OOB to chair past shift: 0    Cardiac:   Cardiac Monitoring: Yes      Cardiac Rhythm: Normal sinus rhythm    Access:   Current line(s): PIV     Genitourinary:   Urinary status: voiding and external catheter    Respiratory:   O2 Device: Room air  Chronic home O2 use?: NO  Incentive spirometer at bedside: YES     GI:  Last Bowel Movement Date: 03/23/21  Current diet:  DIET NPO  Passing flatus: YES  Tolerating current diet: YES       Pain Management:   Patient states pain is manageable on current regimen: YES    Skin:  Kwabena Score: 15  Interventions: float heels and foam dressing    Patient Safety:  Fall Score:  Total Score: 5  Interventions: bed/chair alarm, assistive device (walker, cane, etc), gripper socks and pt to call before getting OOB  High Fall Risk: Yes    Length of Stay:  Expected LOS: 5d 4h  Actual LOS: [place]

## 2021-03-23 NOTE — PERIOP NOTES
TRANSFER - OUT REPORT:    Verbal report given to Elyse Chinchilla RN(name) on Kym Moulton  being transferred to [facility](unit) for routine post - op       Report consisted of patients Situation, Background, Assessment and   Recommendations(SBAR). Information from the following report(s) OR Summary, Procedure Summary, Intake/Output and MAR was reviewed with the receiving nurse. Opportunity for questions and clarification was provided.       Patient transported with:   [place]

## 2021-03-23 NOTE — PROGRESS NOTES
Occupational Therapy  Will defer OT as pt is off the floor for surgery.    Will continue to follow as appropriate

## 2021-03-23 NOTE — PERIOP NOTES
Handoff Report from Operating Room to PACU    Report received from Jarocho French RN and Geovanna Ferrell CRNA regarding Ofelia Reed. Surgeon(s):  Sonya Banegas MD  And Procedure(s) (LRB):  WOUND EXPLORATION AND REMOVAL OF DRAIN (N/A)  confirmed   with dressings discussed. Anesthesia type, drugs, patient history, complications, estimated blood loss, vital signs, intake and output, and last pain medication, lines and temperature were reviewed.

## 2021-03-23 NOTE — BRIEF OP NOTE
Brief Postoperative Note    Patient: Kelli Enamorado  YOB: 1945  MRN: [id number]    Date of Procedure: 3/23/2021     Pre-Op Diagnosis: REMOVAL OF DRAIN    Post-Op Diagnosis: same     Procedure(s):  WOUND EXPLORATION AND REMOVAL OF DRAIN    Surgeon(s):  Ron Gracia MD    Surgical Assistant: Surg Asst-1: Willi Key    Anesthesia: General     Estimated Blood Loss (mL): 0cc    Complications: none    Specimens: * No specimens in log *     Implants: * No implants in log *    Drains:   Hemovac Left Back (Active)   Site Assessment Clean, dry, & intact 03/22/21 0123   Dressing Status Clean, dry, & intact 03/22/21 0123   Drainage Description Sanguinous 03/22/21 0123   Status Patent; Charged 03/22/21 0123   Output (ml) 30 ml 03/22/21 0515       Hemovac Right Back (Active)   Site Assessment Clean, dry, & intact 03/22/21 0123   Dressing Status Clean, dry, & intact 03/22/21 0123   Drainage Description Sanguinous 03/22/21 0123   Status Charged 03/22/21 0123       Findings: retained drain, removed without difficulty    Electronically Signed by Doroteo Siemens, MD on 3/23/2021 at 11:57 AM

## 2021-03-23 NOTE — PERIOP NOTES
TRANSFER - IN REPORT:    Verbal report received from invi on [facility]  being received from  for ordered procedure      Report consisted of patients Situation, Background, Assessment and   Recommendations(SBAR). Information from the following report(s) SBAR, ED Summary, OR Summary, Procedure Summary, Intake/Output and MAR was reviewed with the receiving nurse. Opportunity for questions and clarification was provided. Assessment completed upon patients arrival to unit and care assumed.

## 2021-03-23 NOTE — ANESTHESIA POSTPROCEDURE EVALUATION
Procedure(s):  WOUND EXPLORATION AND REMOVAL OF DRAIN. general    Anesthesia Post Evaluation        Patient location during evaluation: PACU  Note status: Adequate. Level of consciousness: responsive to verbal stimuli and sleepy but conscious  Pain management: satisfactory to patient  Airway patency: patent  Anesthetic complications: no  Cardiovascular status: acceptable  Respiratory status: acceptable  Hydration status: acceptable  Comments: +Post-Anesthesia Evaluation and Assessment    Patient: Marcela Trotter MRN: [id number]  SSN: [id number]   YOB: 1945  Age: 77 y.o. Sex: female      Cardiovascular Function/Vital Signs    BP (!) 168/76   Pulse 80   Temp 36.5 °C (97.7 °F)   Resp 22   Ht 5' 7\" (1.702 m)   Wt 68 kg (150 lb)   SpO2 100%   BMI 23.49 kg/m²     Patient is status post Procedure(s):  WOUND EXPLORATION AND REMOVAL OF DRAIN. Nausea/Vomiting: Controlled. Postoperative hydration reviewed and adequate. Pain:  Pain Scale 1: Numeric (0 - 10) (03/23/21 1230)  Pain Intensity 1: 0 (03/23/21 1230)   Managed. Neurological Status:   Neuro (WDL): Exceptions to WDL (03/23/21 1159)   At baseline. Mental Status and Level of Consciousness: Arousable. Pulmonary Status:   O2 Device: Nasal cannula (03/23/21 1230)   Adequate oxygenation and airway patent. Complications related to anesthesia: None    Post-anesthesia assessment completed. No concerns. Signed By: Sarah Wise MD    3/23/2021  Post anesthesia nausea and vomiting:  controlled      INITIAL Post-op Vital signs:   Vitals Value Taken Time   /76 03/23/21 1230   Temp 36.5 °C (97.7 °F) 03/23/21 1159   Pulse 83 03/23/21 1236   Resp 17 03/23/21 1236   SpO2 98 % 03/23/21 1236   Vitals shown include unvalidated device data.

## 2021-03-23 NOTE — PROGRESS NOTES
Attempted to see pt for PT tx. Currently being taken off the floor for surgery. Will continue to follow.

## 2021-03-23 NOTE — PROGRESS NOTES
Transition of Care Plan:  RUR: 12% low  Disposition: SNF pending choice   Follow up appointments: ortho  DME needed: n/a  Transportation at Discharge: likely BLS  [place] or means to access home:  n/a      IM Medicare letter: to be provided prior to d/c  Caregiver Contact: Lois espino [phone]  Discharge Caregiver contacted prior to discharge? 9:45am  [facility] currently not accepting any patients at this time. CM attempted to make room visit with patient to obtain another choice for SNF but she was already off the floor in OR for procedure. Will follow up post op.     9:00am  CM made room visit with patient who reported she would like to go to [facility]. FOC signed and placed on bedside chart. Referral sent to [facility] via cclink.      Carina Alvarez, [address] [facility] [place]

## 2021-03-23 NOTE — ROUTINE PROCESS
sbar in note pt to holding area identifies self and procedure for today. Sister   Did telephone consent with two nurses as pt is confused to day and  Exact procedure. Pt is pleasant and cooperative. Pt has been npo except for meds. covid test negative from the 21st   Wears mask in public. Reports no s.s covid virus nor has she been around anyone with the coovid virus that she knows of.   bang completed. Dr. Vargas Sink to  wnts vancomycin as no antibiotic ordered on pt has pcn allergy. Pt attempted to void x2 in holding area with no success. Dry at the time going into the or.

## 2021-03-24 LAB
COVID-19 RAPID TEST, COVR: NOT DETECTED
GLUCOSE BLD STRIP.AUTO-MCNC: 112 MG/DL (ref 65–100)
GLUCOSE BLD STRIP.AUTO-MCNC: 126 MG/DL (ref 65–100)
GLUCOSE BLD STRIP.AUTO-MCNC: 133 MG/DL (ref 65–100)
GLUCOSE BLD STRIP.AUTO-MCNC: 135 MG/DL (ref 65–100)
SERVICE CMNT-IMP: ABNORMAL
SOURCE, COVRS: NORMAL

## 2021-03-24 PROCEDURE — 74011250637 HC RX REV CODE- 250/637: Performed by: NEUROLOGICAL SURGERY

## 2021-03-24 PROCEDURE — L0627 LO SAG RI AN/POS PNL PRE CST: HCPCS

## 2021-03-24 PROCEDURE — 97530 THERAPEUTIC ACTIVITIES: CPT

## 2021-03-24 PROCEDURE — 65270000029 HC RM PRIVATE

## 2021-03-24 PROCEDURE — 82962 GLUCOSE BLOOD TEST: CPT

## 2021-03-24 PROCEDURE — 92523 SPEECH SOUND LANG COMPREHEN: CPT

## 2021-03-24 PROCEDURE — 87635 SARS-COV-2 COVID-19 AMP PRB: CPT

## 2021-03-24 PROCEDURE — 94760 N-INVAS EAR/PLS OXIMETRY 1: CPT

## 2021-03-24 PROCEDURE — 74011250637 HC RX REV CODE- 250/637: Performed by: NURSE PRACTITIONER

## 2021-03-24 PROCEDURE — 77030038269 HC DRN EXT URIN PURWCK BARD -A

## 2021-03-24 PROCEDURE — 97535 SELF CARE MNGMENT TRAINING: CPT | Performed by: OCCUPATIONAL THERAPIST

## 2021-03-24 PROCEDURE — 2709999900 HC NON-CHARGEABLE SUPPLY

## 2021-03-24 PROCEDURE — 74011000250 HC RX REV CODE- 250: Performed by: NEUROLOGICAL SURGERY

## 2021-03-24 RX ORDER — ADHESIVE BANDAGE
30 BANDAGE TOPICAL DAILY PRN
Status: DISCONTINUED | OUTPATIENT
Start: 2021-03-24 | End: 2021-03-25 | Stop reason: HOSPADM

## 2021-03-24 RX ADMIN — ACETAMINOPHEN 650 MG: 325 TABLET, FILM COATED ORAL at 23:16

## 2021-03-24 RX ADMIN — Medication 10 ML: at 13:28

## 2021-03-24 RX ADMIN — TRAMADOL HYDROCHLORIDE 50 MG: 50 TABLET, FILM COATED ORAL at 15:58

## 2021-03-24 RX ADMIN — ACETAMINOPHEN 650 MG: 325 TABLET, FILM COATED ORAL at 17:03

## 2021-03-24 RX ADMIN — Medication 10 ML: at 08:35

## 2021-03-24 RX ADMIN — Medication 1 AMPULE: at 21:55

## 2021-03-24 RX ADMIN — ACETAMINOPHEN 650 MG: 325 TABLET, FILM COATED ORAL at 11:50

## 2021-03-24 RX ADMIN — MAGNESIUM HYDROXIDE 30 ML: 400 SUSPENSION ORAL at 17:03

## 2021-03-24 RX ADMIN — POLYETHYLENE GLYCOL 3350 17 G: 17 POWDER, FOR SOLUTION ORAL at 08:33

## 2021-03-24 RX ADMIN — ACETAMINOPHEN 650 MG: 325 TABLET, FILM COATED ORAL at 05:58

## 2021-03-24 RX ADMIN — DOCUSATE SODIUM 100 MG: 100 CAPSULE, LIQUID FILLED ORAL at 08:33

## 2021-03-24 RX ADMIN — Medication 10 ML: at 21:56

## 2021-03-24 RX ADMIN — DOCUSATE SODIUM 100 MG: 100 CAPSULE, LIQUID FILLED ORAL at 17:03

## 2021-03-24 RX ADMIN — Medication 1 AMPULE: at 08:33

## 2021-03-24 NOTE — PROGRESS NOTES
Comprehensive Nutrition Assessment    Type and Reason for Visit: Initial, RD nutrition re-screen/LOS    Nutrition Recommendations/Plan:   Continue CCD  Glucerna added TID (chococate) to promote adequate nutrition for recovery s/p surgery  Please document % meals and supplements consumed in flowsheet I/O's under intake     Nutrition Assessment:      Chart reviewed for LOS. Pt noted for severe lumbago, compression deformity T12, HTN, DM2. S/p spinal surgery 3/19. No meal intake data has been recorded throughout 7 day admission. Pt reports decreased appetite x 1wk PTA after her fall, unable to ambulate much to make food. She lives alone but family brought her food, though she still did not eat much. Since admission, her appetite is doing much better and she is eating most of her meals plus Glucerna. She also drinks Glucerna at home. Will continue TID. Pt c/o constipation. Only a small BM was recorded yesterday which was the only one since admission. She is passing flatus. Colace and miralax on board. No wt hx in EMR. Pt reports usual weight of 150#. Wt Readings from Last 5 Encounters:   03/17/21 68 kg (150 lb)   ]    Estimated Daily Nutrient Needs:  Energy (kcal): 1576 kcal (BMR x 1. 3AF); Weight Used for Energy Requirements: Current  Protein (g): 82-95g (1.2-1.4g/kg); Weight Used for Protein Requirements: Current  Fluid (ml/day): 1600mL; Method Used for Fluid Requirements: 1 ml/kcal      Nutrition Related Findings:  Labs: -153mg/dL. Meds: colace, humalog, miralax. Edema, 2+LUE, trace BLE. BM 3/23 small.       Wounds:    Surgical incision       Current Nutrition Therapies:  DIET DIABETIC CONSISTENT CARB Regular  DIET NUTRITIONAL SUPPLEMENTS Breakfast, Lunch, Dinner; Glucerna Shake  DIET ONE TIME MESSAGE    Anthropometric Measures:  · Height:  5' 7\" (170.2 cm)  · Current Body Wt:  68 kg (149 lb 14.6 oz)   · Ideal Body Wt:  135 lbs:  111 %   · BMI Category:  Normal weight (BMI 22.0-24.9) age over 72       Nutrition Diagnosis:   · Increased nutrient needs related to (wound healing) as evidenced by (s/p back surgery)      Nutrition Interventions:   Food and/or Nutrient Delivery: Continue current diet, Continue oral nutrition supplement  Nutrition Education and Counseling: No recommendations at this time  Coordination of Nutrition Care: Continue to monitor while inpatient    Goals:  PO intake >70% meals and supplements next 4-6 days       Nutrition Monitoring and Evaluation:   Behavioral-Environmental Outcomes: None identified  Food/Nutrient Intake Outcomes: Food and nutrient intake, Supplement intake  Physical Signs/Symptoms Outcomes: Biochemical data, Skin, Weight, GI status, Fluid status or edema    Discharge Planning:    Continue current diet, Continue oral nutrition supplement     Electronically signed by Ceci Fraire RD on 3/24/2021 at 3:42 PM    Contact: [phone]  Pager [phone]

## 2021-03-24 NOTE — PROGRESS NOTES
End of Shift Note    Bedside shift change report given to Nathan Reynolds (oncoming nurse) by Leif Major (offgoing nurse). Report included the following information SBAR, Kardex, Intake/Output, MAR, Recent Results, Med Rec Status and Cardiac Rhythm Sinus Tachy    Shift worked:  [id number]     Shift summary and any significant changes:     no changes, patient is voiding, had a small BM, patient is now calling for when she has to void and purwick being placed at that time     Concerns for physician to address:  New complaint as of this AM of burning sensation to pubic area     Zone phone for oncoming shift:   [phone]       Activity:  Activity Level: Up with Assistance  Number times ambulated in hallways past shift: 0  Number of times OOB to chair past shift: 0    Cardiac:   Cardiac Monitoring: Yes      Cardiac Rhythm: Sinus tachycardia    Access:   Current line(s): PIV     Genitourinary:   Urinary status: voiding and external catheter    Respiratory:   O2 Device: Room air  Chronic home O2 use?: NO  Incentive spirometer at bedside: NO     GI:  Last Bowel Movement Date: 03/23/21  Current diet:  DIET DIABETIC CONSISTENT CARB Regular  Passing flatus: YES  Tolerating current diet: YES       Pain Management:   Patient states pain is manageable on current regimen: YES    Skin:  Kwabena Score: 15  Interventions: increase time out of bed, foam dressing and PT/OT consult    Patient Safety:  Fall Score:  Total Score: 4  Interventions: bed/chair alarm, assistive device (walker, cane, etc), gripper socks and pt to call before getting OOB  High Fall Risk: Yes    Length of Stay:  Expected LOS: 5d 4h  Actual LOS: [facility]

## 2021-03-24 NOTE — OP NOTES
[address]  OPERATIVE REPORT    Name:  Ludmila Gómez  MR#:  [id number]  :  1945  ACCOUNT #:  [de-identified]  DATE OF SERVICE:  2021    PREOPERATIVE DIAGNOSIS:  Retained drain. POSTOPERATIVE DIAGNOSIS:  Retained drain. PROCEDURE PERFORMED:  Wound exploration, removal of drain. SURGEON:  Piero Stanley MD    ASSISTANT:  France Avila. ANESTHESIA:  General.    COMPLICATIONS:  None. SPECIMENS REMOVED:  None. IMPLANTS:  None. ESTIMATED BLOOD LOSS:  Zero. DRAINS:  None. FINDINGS:  Retained drain removed without difficulty. INDICATIONS FOR SURGERY:  This is a 78-year-old woman who has a T12 compression fracture. She underwent a T10 to L2 instrumented fusion on Friday, surgery went well. I had placed 2 Hemovac drains and connected these to a single reservoir. She has done well over the weekend. Yesterday, a nurse practitioner noted difficulty pulling one of the drain. She asked my assistance, it did appear that the drain was stuck. I did pull and the drain broke. I discussed with removal of the retained drain fragment with the patient. I told her that, I thought it would be best to do it in the operating room in a sterile environment and that she would need to be prone as she has got a right clavicle fracture and a left dislocated shoulder, she is unable to lie on either side. Given that she would be prone, I thought the best for her to be under general anesthesia with securing her airway. Risks and benefits were discussed. She understood these and agreed to proceed. OPERATIVE COURSE:   The patient was brought to the operating room. She was given 1 g of vancomycin. She has a PENICILLIN ALLERGY. This was given within 1 hour of incision. She had  SCDs on and functioning during the entire case. She was intubated and turned prone on the gel rolls. All pressure points were padded. She was sterilely prepped and draped in standard fashion.   I opened the incision with scissors. There was a Monocryl subcutaneous stitch, all of this was removed. At the most caudal end of the incision, I  opened first the subcutaneous tissue, then Bertha's fascia and then the muscle fascia. I opened approximately 1 inch of the deep areas, I found the drain. It was removed without difficulty. I irrigated copiously with Irrisept and then with 750 mL of irrigation. I closed using 0 interrupted Vicryls for the muscle and muscle fascia, 2-0 interrupted Vicryl for Bertha's fascia, interrupted 3-0 Vicryl for the subcutaneous tissue, then Monocryl for the skin. Dermabond was placed on the skin. She tolerated the surgery well and was taken to postoperative care unit.       Rom Espino MD      [id number]  D:  03/23/2021 12:19  T:  03/23/2021 22:05  JOB #:  [id number]

## 2021-03-24 NOTE — PROGRESS NOTES
Problem: Mobility Impaired (Adult and Pediatric)  Goal: *Acute Goals and Plan of Care (Insert Text)  Description: FUNCTIONAL STATUS PRIOR TO ADMISSION: Patient required moderate assistance for basic and instrumental ADLs due to back pain. Her ability to walk and transfer progressively declined following a fall 2 weeks ago. Her LUE was non-functional due to pain following recent fall - poor ability to use it prior to that since falling in December fracturing her humerus - ORIF at [facility]. HOME SUPPORT PRIOR TO ADMISSION: The patient lived alone with paid caregiver to provide assistance during the day. POA lives outside the state. Home has no steps to enter. Physical Therapy Goals  Initiated 3/20/2021    1. Patient will move from supine to sit and sit to supine  and roll side to side in bed with moderate assistance  within 4 days. 2. Patient will perform sit to stand with moderate assistance x 2 within 4 days. 3. Patient will ambulate with moderate assistance x 2 for 15 feet with the least restrictive device within 4 days. 4. Patient will verbalize and demonstrate understanding of spinal precautions (No bending, lifting greater than 5 lbs, or twisting; log-roll technique; frequent repositioning as instructed) within 4 days. Outcome: Progressing Towards Goal    PHYSICAL THERAPY TREATMENT  Patient: Evelia Briones (55 y.o. female)  Date: 3/24/2021  Diagnosis: Back pain [M54.9]  Compression fracture of body of thoracic vertebra ([address] [S22.000A] <principal problem not specified>  Procedure(s) (LRB):  WOUND EXPLORATION AND REMOVAL OF DRAIN (N/A) 1 Day Post-Op  Precautions: Spinal, Skin, Fall, Other (comment)(TLSO needed - RN called [employer])  Chart, physical therapy assessment, plan of care and goals were reviewed. ASSESSMENT  Patient continues with skilled PT services and is progressing towards goals. Pt was received in supine and cleared by nursing to mobilize.  Pt with impaired safety awareness and poor problem solving/reasoning. She needed increased cues for log rolling. Assisted with  brace donning and adjusting of sling. Stood and able to take a few steps to get to the [facility]. HR increased to 150bpm when sitting on the BSC. Pt reported feelings of having to void and have a BM, but was unable to when attempting 2 different times. She bearing down and straining during standing oxygen saturation dropped to 74% on RA. Once she was returned to supine HR 113bpm and oxygen 98%. Nursing aware of vitals during session. Current Level of Function Impacting Discharge (mobility/balance): min A    Other factors to consider for discharge:          PLAN :  Patient continues to benefit from skilled intervention to address the above impairments. Continue treatment per established plan of care. to address goals. Recommendation for discharge: (in order for the patient to meet his/her long term goals)  Therapy up to 5 days/week in SNF setting    This discharge recommendation:  Has not yet been discussed the attending provider and/or case management    IF patient discharges home will need the following DME: to be determined (TBD)       SUBJECTIVE:   Patient stated i have to have a BM.     OBJECTIVE DATA SUMMARY:   Critical Behavior:  Neurologic State: Alert, Confused, Appropriate for age  Orientation Level: Oriented to person, Oriented to place, Oriented to situation(oriented to year but not month. knew it was [place])  Cognition: Follows commands  Safety/Judgement: Decreased insight into deficits  Functional Mobility Training:  Bed Mobility:  Rolling: Minimum assistance  Supine to Sit: Minimum assistance  Sit to Supine: Minimum assistance  Scooting: Contact guard assistance        Transfers:  Sit to Stand: Minimum assistance  Stand to Sit: Minimum assistance                             Balance:  Sitting: Intact  Standing: Impaired  Standing - Static: Fair;Constant support  Standing - Dynamic : Fair;Constant support  Ambulation/Gait Training:  Distance (ft): 3 Feet (ft)  Assistive Device: Brace/Splint;Gait belt(HHA)  Ambulation - Level of Assistance: Minimal assistance        Gait Abnormalities: Decreased step clearance; Path deviations; Shuffling gait        Base of Support: Narrowed     Speed/Genie: Pace decreased (<100 feet/min); Shuffled  Step Length: Left shortened;Right shortened            Activity Tolerance:   Fair    After treatment patient left in no apparent distress:   Supine in bed and Call bell within reach    COMMUNICATION/COLLABORATION:   The patients plan of care was discussed with: Occupational therapist and Registered nurse.      Loye Olszewski, PT, DPT   Time Calculation: 41 mins

## 2021-03-24 NOTE — PROGRESS NOTES
Problem: Self Care Deficits Care Plan (Adult)  Goal: *Acute Goals and Plan of Care (Insert Text)  Description: FUNCTIONAL STATUS PRIOR TO ADMISSION: Independent with majority of ADL tasks. Was receiving assist with showering from paid caregiver. No longer drives. Sustained at least x2 falls since December 2020. HOME SUPPORT: Lived alone with her 2 cats. Recently hired paid caregiver who was assisting Pt with showering and IADL tasks a few times per week for a few hours starting in December 2020. Caregiver recently had to assist Pt daily \"with everything\" s/p recent fall d/t Pts back and LUE p! Caregiver also drives Pt to appointments. Occupational Therapy Goals  Initiated 3/20/2021    1. Patient will perform self-feeding with modified independence within 7 days. 2.  Patient will perform upper body dressing with Mod A using R non-dominant hand and adaptive strategies PRN within 7 days. 3.  Patient will lower body dressing with Mod A using R non-dominant hand and adaptive strategies PRN within 7 days. 4.  Patient will don/doff back brace with Mod A within 7 days. 5.  Patient will verbalize/demonstrate 3/3 back precautions during ADL tasks without cues within 7 days. Outcome: Progressing Towards Goal   OCCUPATIONAL THERAPY TREATMENT  Patient: Enzo Mckeon (51 y.o. female)  Date: 3/24/2021  Diagnosis: Back pain [M54.9]  Compression fracture of body of thoracic vertebra ([address] [S22.000A] <principal problem not specified>  Procedure(s) (LRB):  WOUND EXPLORATION AND REMOVAL OF DRAIN (N/A) 1 Day Post-Op  Precautions: Spinal, Skin, Fall, Other (comment)(TLSO needed - RN called [employer])  Chart, occupational therapy assessment, plan of care, and goals were reviewed. ASSESSMENT  Patient continues with skilled OT services and is progressing towards goals. Pt is much more aware of her environment and situation this date, orientation is increased and she actively engages in tx session. Needs total assist for managing her L splint and sling, as well as depends garment. Pt is generally min A for adl mobility and used the RW for some transfers. Good tolerance for transfers to [facility] and [facility] needed. Pt will benefit from rehab at discharge     Current Level of Function Impacting Discharge (ADLs): up to maximal/total A for adls    Other factors to consider for discharge: L shoulder? Wrist spling--notes state to wear wrist splint. PLAN :  Patient continues to benefit from skilled intervention to address the above impairments. Continue treatment per established plan of care to address goals. Recommend with staff:  up to [facility]      Recommendation for discharge: (in order for the patient to meet his/her long term goals)  Therapy up to 5 days/week in SNF setting    This discharge recommendation:  Has not yet been discussed the attending provider and/or case management    IF patient discharges home will need the following DME: needs rehab       SUBJECTIVE:   Patient stated yes, I'd like to get out of bed.     OBJECTIVE DATA SUMMARY:   Cognitive/Behavioral Status:  Neurologic State: Alert; Appropriate for age  Orientation Level: Oriented to person;Oriented to place;Oriented to situation(oriented to year but not month. knew it was [place])  Cognition: Follows commands  Perception: Appears intact  Perseveration: No perseveration noted  Safety/Judgement: Decreased insight into deficits    Functional Mobility and Transfers for ADLs:  Bed Mobility:  Rolling: Minimum assistance  Supine to Sit: Minimum assistance  Sit to Supine: Minimum assistance  Scooting: Contact guard assistance    Transfers:  Sit to Stand: Minimum assistance  Functional Transfers  Toilet Transfer : Minimum assistance; Additional time  Adaptive Equipment: Bedside commode;Walker (comment)       Balance:  Sitting: Intact  Standing: Impaired  Standing - Static: Fair;Constant support  Standing - Dynamic : Fair;Constant support    ADL Intervention:  Feeding  Feeding Assistance: Set-up(pt attempting one hand technique for opening pkgs)  Container Management: Moderate assistance  Cutting Food: Total assistance (dependent)(would benefit from rocker knife)  Utensil Management: Independent(RUE)  Food to Mouth: Independent  Drink to Mouth: Independent  Cues: Physical assistance; Tactile cues provided                   Upper Body Dressing Assistance  Orthotics(Brace): Total assistance (dependent)(sling and wrist brace)  Hospital Gown: Total assistance (dependent)    Lower Body Dressing Assistance  Protective Undergarmet: Total assistance (dependent)    Toileting  Toileting Assistance: Set-up; Moderate assistance  Bowel Hygiene: Moderate assistance  Clothing Management: Maximum assistance  Cues: Physical assistance for pants up; Tactile cues provided;Verbal cues provided;Visual cues provided  Adaptive Equipment: Walker(BSC)    Cognitive Retraining  Safety/Judgement: Decreased insight into deficits      Pain:  No complaints    Activity Tolerance:   HR increased into 140s during bsc transfers    After treatment patient left in no apparent distress:   Supine in bed-HOB raised and set up for meal, Call bell within reach, Bed / chair alarm activated, and Side rails x 3    COMMUNICATION/COLLABORATION:   The patients plan of care was discussed with: Physical therapist, Registered nurse, and NP .      Christopher Hall OTR/L  Time Calculation: 44 mins

## 2021-03-24 NOTE — PROGRESS NOTES
Hospitalist Progress Note    NAME: Nae Pack   :  1945   MRN:  [id number]       Assessment / Plan:    Severe Lumbago   Severe Compression Deformity at T12   CT Spine 3/17             1. Severe compression deformity at T12, which may be subacute or acute, with 5 mm associated retropulsion of bone at the superior endplate and central stenosis at T11-12. 2. Multilevel degenerative change otherwise with central and foraminal stenosis. 3. Left adrenal adenoma or cyst.             4. Generalized osteopenia with a single focal area of low density in the vertebral body of E53, of uncertain if any clinical significance. Correlate with clinical history. Further evaluation would require lumbar spine MRI. MRI Thoracic Spine            1. Acute or subacute T12 compression deformity with retropulsion of bone toward the thecal sac, effacing the distal spinal cord. 2. Overall mild thoracic kyphosis and multilevel degenerative change with no other compression deformity. Appreciate Neurosurgery - S/p T10-L2 fusion, decompression of spinal cord at T12 and biopsy of T12 vertebral body POD #3  S/P REMOVAL OF DRAIN   Continue  PRN cyclobenzaprine  Continue bowel regime  Appreciate PT/OT evaluations   Appreciate CM - Accepted to [facility]   Awaiting TLSO brace fitting   COVID screening COMPLETED    One dose on Vancomycin .  I discussed with Dr Tatianna Knott, input is appreciated     Tachycardia - resolved    Fever with Chills post op - resolved   Ruled Out PE  Afebrile now 48 hours   Blood Cultures - NG so far    Lactate WNL   Procalcitonin NEGATIVE    CXR - No acute cardiopulmonary process   CTA negative for PE        Hypokalemia - resolved      Hypertension   Does not appear to take any anti-hypertensives  BP elevated at times due to pain - monitor   PRN hydralazine      Type II Diabetes  History of DM but not on any coverage OP   HgbA1c 5.3%     S/p ORIF of Left Humerus Fracture x 5 years    New Limited ROM to Left Arm s/p Fall 2 weeks Ago   XR Humerus/Forearm - Surgical fixation of left humerus fracture   CXR - Left shoulder dislocation. Right clavicle and upper rib fractures, age indeterminate. Appreciate Ortho Input - S/p closed reduction of should dislocation  Follow up with [facility]   Continue wrist brace   Patient reports some swelling to arm - dopplers NEGATIVE for DVT        18.5 - 24.9 Normal weight / Body mass index is 23.49 kg/m². Code status: Full    Recommended Disposition: SNF awaiting placement. Patient is hemodynamically stable to be discharged once placement is available. Subjective:     Chief Complaint / Reason for Physician Visit  Discussed with RN events overnight. Patient was seen and examined. No acute events overnight. \"I am feeling okay\"    Review of Systems:  Symptom Y/N Comments  Symptom Y/N Comments   Fever/Chills n   Chest Pain n    Poor Appetite    Edema     Cough n   Abdominal Pain n    Sputum    Joint Pain     SOB/COULTER n   Pruritis/Rash     Nausea/vomit n   Tolerating PT/OT     Diarrhea    Tolerating Diet y    Constipation    Other       Could NOT obtain due to:          Objective:     VITALS:   Last 24hrs VS reviewed since prior progress note.  Most recent are:  Patient Vitals for the past 24 hrs:   Temp Pulse Resp BP SpO2   03/24/21 0749 98.2 °F (36.8 °C) 98 18 131/61 100 %   03/24/21 0342 98.4 °F (36.9 °C) 100 18 (!) 150/68 98 %   03/23/21 2325 98.8 °F (37.1 °C) (!) 109 18 (!) 145/72 96 %   03/23/21 1949 97.8 °F (36.6 °C) (!) 109 16 (!) 143/62 97 %   03/23/21 1719 97.6 °F (36.4 °C) (!) 102 16 138/83 97 %   03/23/21 1605 97.5 °F (36.4 °C) 97 16 138/63 98 %   03/23/21 1505 97.8 °F (36.6 °C) 89 14 (!) 146/63 97 %   03/23/21 1450 -- -- -- 137/67 --   03/23/21 1406 97.7 °F (36.5 °C) 84 14 (!) 182/81 95 %   03/23/21 1321 97.8 °F (36.6 °C) 84 13 (!) 169/80 95 %   03/23/21 1300 -- 79 13 (!) 151/64 94 %   03/23/21 1245 97.7 °F (36.5 °C) 78 13 (!) 160/68 95 % 03/23/21 1230 -- 80 22 (!) 168/76 100 %   03/23/21 1215 -- 83 13 (!) 157/70 100 %   03/23/21 1210 -- 79 12 (!) 148/65 100 %   03/23/21 1205 -- 85 24 (!) 168/69 100 %   03/23/21 1200 -- 87 22 (!) 165/86 100 %   03/23/21 1159 97.7 °F (36.5 °C) 87 20 (!) 157/70 100 %   03/23/21 1155 -- 84 15 (!) 157/70 100 %       Intake/Output Summary (Last 24 hours) at 3/24/2021 1009  Last data filed at 3/23/2021 1151  Gross per 24 hour   Intake 650 ml   Output --   Net 650 ml        I had a face to face encounter and independently examined this patient on 3/24/2021, as outlined below:  PHYSICAL EXAM:  General: WD, WN. Alert, cooperative, no acute distress    EENT:  EOMI. Anicteric sclerae. MMM  Resp:  CTA bilaterally, no wheezing or rales. No accessory muscle use  CV:  Regular  rhythm,  No edema  GI:  Soft, Non distended, Non tender. +Bowel sounds  Neurologic:  Alert and oriented X 3, normal speech,   Psych:   Good insight. Not anxious nor agitated  Skin:  No rashes. No jaundice    Reviewed most current lab test results and cultures  YES  Reviewed most current radiology test results   YES  Review and summation of old records today    NO  Reviewed patient's current orders and MAR    YES  PMH/SH reviewed - no change compared to H&P  ________________________________________________________________________  Care Plan discussed with:    Comments   Patient x    Family      RN x    Care Manager     Consultant                        Multidiciplinary team rounds were held today with , nursing, pharmacist and clinical coordinator. Patient's plan of care was discussed; medications were reviewed and discharge planning was addressed.      ________________________________________________________________________  Total NON critical care TIME:  35   Minutes    Total CRITICAL CARE TIME Spent:   Minutes non procedure based      Comments   >50% of visit spent in counseling and coordination of care ________________________________________________________________________  Josselin Collins MD     Procedures: see electronic medical records for all procedures/Xrays and details which were not copied into this note but were reviewed prior to creation of Plan. LABS:  I reviewed today's most current labs and imaging studies.   Pertinent labs include:  Recent Labs     03/22/21 0511   HGB 8.3*   HCT 25.5*     Recent Labs     03/22/21 0511      K 4.3      CO2 25   *   BUN 8   CREA 0.43*   CA 9.0       Signed: Josselin Collins MD

## 2021-03-24 NOTE — PROGRESS NOTES
Transition of Care Plan:  RUR: 12% low  Disposition: SNF referrals sent   Follow up appointments: ortho  DME needed: n/a  Transportation at [address] or means to access home:  n/a      IM Medicare letter: to be provided prior to d/c  Caregiver Contact: sisterBela [phone]  Discharge Caregiver contacted prior to discharge? 9:45am  CM made room visit with patient to inform her that [facility] is not currently accepting any patients at this time. Patient requested referrals be sent to [facility] and [facility]. Referrals sent and waiting for response.      Jackie Zhao, [address]

## 2021-03-24 NOTE — PROGRESS NOTES
Ortho / Neurosurgery NP Note    POD# 5  s/p T10- L2  PEDICLE SCREW MADISON FUSION WITH KYPHON CEMENT, DECOMPRESSION OF SPINAL CORD AT THORACIC TWELVE, THORACIC TWELVE LEFT AND RIGHT BONE BIOPSIES   POD#1 s/p WOUND EXPLORATION, REMOVAL OF DRAIN   Pt seen with no visitor present. Pt resting in bed, in NAD. Much more awake and alert today. AOx3. Complaints of \"rectal pain\" described as burning and pressure. Small BM last night, but no other BMs since admission   Reports minimal back pain, relieved by tylenol alone. Pre-op symptoms of anterior thigh pain and back spasms have resolved. Pt reports good appetite. Tolerating 50% of regular breakfast tray. VSS Afebrile. Room air.    Remote Tele    Visit Vitals  /61   Pulse 98   Temp 98.2 °F (36.8 °C)   Resp 18   Ht 5' 7\" (1.702 m)   Wt 68 kg (150 lb)   SpO2 100%   BMI 23.49 kg/m²       Voiding status: Incontinent of large amount of urine - RN provided incontinent care and placed purwick   Output (mL)  Urine Voided: 400 ml (03/23/21 0337)  Last Bowel Movement Date: 03/23/21 (03/23/21 2023)  Unmeasurable Output  Urine Occurrence(s): 1 (03/24/21 0308)  Stool Occurrence(s): 1 (03/24/21 0308)      Labs    Lab Results   Component Value Date/Time    HGB 8.3 (L) 03/22/2021 05:11 AM      Lab Results   Component Value Date/Time    INR 1.0 03/18/2021 02:34 PM      Lab Results   Component Value Date/Time    Sodium 139 03/22/2021 05:11 AM    Potassium 4.3 03/22/2021 05:11 AM    Chloride 108 03/22/2021 05:11 AM    CO2 25 03/22/2021 05:11 AM    Glucose 110 (H) 03/22/2021 05:11 AM    BUN 8 03/22/2021 05:11 AM    Creatinine 0.43 (L) 03/22/2021 05:11 AM    Calcium 9.0 03/22/2021 05:11 AM     Recent Glucose Results:   Lab Results   Component Value Date/Time    GLUCPOC 112 (H) 03/24/2021 06:46 AM    GLUCPOC 153 (H) 03/23/2021 09:27 PM    GLUCPOC 143 (H) 03/23/2021 03:23 PM           Body mass index is 23.49 kg/m². : A BMI > 30 is classified as obesity and > 40 is classified as morbid obesity. Incision with dermabond - c/d/i. Erythema around edges of incision, non-tender, no drainage, very small area of soft tissues swelling at distal left end of incision where drain exit site was. Bed pad soaked with urine up towards incision - will apply optifoam to protect incision   Calves soft and supple; No pain with passive stretch  Sensation and motor intact - PF/DF 5/5, +EHL. Now able to lift legs off bed w/o difficulty. SLR 4+/5  SCDs for mechanical DVT proph while in bed     PLAN:  1) Neurovascular assessment q4 hours   2) PT/OT - patient to recieve TLSO from [place] [id number] pending purchase order. Provided with Aspen LSO brace until TLSO arrives. Therapy should not be held while awaiting brace. CM working   3) Pain control - oxycodone changed to tramadol, however has only required tylenol since. Mentation significantly improved, may have some underlying dementia at baseline. Continue scheduled tylenol. 4) Bowel regimen - continue miralax and colace. Will add milk of mag prn   5) Readniess for discharge:     [x] Vital Signs stable    [x] Hgb stable    [x] + Voiding    [x] Wound intact, drainage minimal    [x] Tolerating PO intake     [] Cleared by PT (OT if applicable)     [] Stair training completed (if applicable)    [] Independent / Contact Guard Assist (household distance)     [] Bed mobility     [] Car transfers     [] ADLs    [x] Adequate pain control on oral medication alone     COVID test negative (3/21) for rehab placement. Stable for discharge from a neurosurgery standpoint. Will work on obtaining purchase order for brace.      Lindsay Ordonez NP

## 2021-03-24 NOTE — PROGRESS NOTES
Spiritual Care Assessment/Progress Note  [facility]      NAME: Ania Burrell      MRN: [id number]  AGE: 77 y.o.  SEX: female  Taoism Affiliation: Cici   Language: English     3/24/2021     Total Time (in minutes): 13     Spiritual Assessment begun in [facility] through conversation with:         [x]Patient        [] Family    [] Friend(s)        Reason for Consult: Initial/Spiritual assessment, patient floor     Spiritual beliefs: (Please include comment if needed)     [x] Identifies with a cody tradition:  Cici       [] Supported by a cody community:            [] Claims no spiritual orientation:           [] Seeking spiritual identity:                [] Adheres to an individual form of spirituality:           [] Not able to assess:                           Identified resources for coping:      [x] Prayer                               [] Music                  [] Guided Imagery     [x] Family/friends                 [] Pet visits     [] Devotional reading                         [] Unknown     [] Other:                                      Interventions offered during this visit: (See comments for more details)    Patient Interventions: Affirmation of emotions/emotional suffering, Affirmation of cody, Catharsis/review of pertinent events in supportive environment, Coping skills reviewed/reinforced, Iconic (affirming the presence of God/Higher Power), Initial/Spiritual assessment, patient floor, Normalization of emotional/spiritual concerns, Prayer (assurance of), Taoism beliefs/image of God discussed           Plan of Care:     [] Support spiritual and/or cultural needs    [] Support AMD and/or advance care planning process      [] Support grieving process   [] Coordinate Rites and/or Rituals    [] Coordination with community clergy   [x] No spiritual needs identified at this time   [] Detailed Plan of Care below (See Comments)  [] Make referral to Music Therapy  [] Make referral to Pet Therapy     [] Make referral to Addiction services  [] Make referral to [facility]  [] Make referral to Spiritual Care Partner  [] No future visits requested        [x] Follow up upon further referrals     Comments:   Initial visit on Ortho for spiritual assessment. A friend was visiting but stepped out of the room while  visited with patient. She shared about event leading to hospitalization and surgery. She lives alone; family lives in [place]. 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 Mrs. Krista Andrade is [employer]. She expressed no spiritual concerns/needs at this time. Provided bedside presence, supportive listening and assurance of prayer. Advised of ongoing availability of pastoral support.      NIKITA Caldwell, [facility], Staff [address] [facility] [employer] Paging Service  [phone])

## 2021-03-24 NOTE — PROGRESS NOTES
End of Shift Note    Bedside shift change report given to KAREN Mcmullen (oncoming nurse) by Curtis Starr (offgoing nurse). Report included the following information SBAR, Kardex, OR Summary, Procedure Summary, Intake/Output, MAR, Recent Results and Cardiac Rhythm NSR or Sinus Tach during shift    Shift worked:  Day     Shift summary and any significant changes:     Patient worked with PT/OT. Patient feels like she needs to have a bowel movement. Milk of magnesium given this evening. Patient answers A&O questions but is confused. Needs two person assist when ambulating. Rapid covid sent to lab     Concerns for physician to address:  Discharge and placement     Zone phone for oncoming shift:   [phone]       Activity:  Activity Level: Up with Assistance  Number times ambulated in hallways past shift: 0  Number of times OOB to chair past shift: 1    Cardiac:   Cardiac Monitoring: Yes      Cardiac Rhythm: Normal sinus rhythm    Access:   Current line(s): PIV     Genitourinary:   Urinary status: incontinent    Respiratory:   O2 Device: Room air  Chronic home O2 use?: NO  Incentive spirometer at bedside: NO     GI:  Last Bowel Movement Date: 03/23/21  Current diet:  DIET DIABETIC CONSISTENT CARB Regular  DIET NUTRITIONAL SUPPLEMENTS Breakfast, Lunch, Dinner; Glucerna Shake  DIET ONE TIME MESSAGE  Passing flatus: YES  Tolerating current diet: YES       Pain Management:   Patient states pain is manageable on current regimen: YES    Skin:  Kwabena Score: 16  Interventions: increase time out of bed, limit briefs and internal/external urinary devices    Patient Safety:  Fall Score:  Total Score: 5  Interventions: bed/chair alarm, assistive device (walker, cane, etc), gripper socks, pt to call before getting OOB and stay with me (per policy)  High Fall Risk: Yes    Length of Stay:  Expected LOS: 3d 0h  Actual LOS: [place] [address]

## 2021-03-24 NOTE — PROGRESS NOTES
SPEECH LANGUAGE PATHOLOGY EVALUATION/DISCHARGE  Patient: Shanetl Barger (89 y.o. female)  Date: 3/24/2021  Primary Diagnosis: Back pain [M54.9]  Compression fracture of body of thoracic vertebra (HCC) [S22.000A]  Procedure(s) (LRB):  WOUND EXPLORATION AND REMOVAL OF DRAIN (N/A) 1 Day Post-Op   Precautions:   Spinal, Skin, Fall, Other (comment)(TLSO needed - RN called [place])    ASSESSMENT :  Based on the objective data described below, the patient presents with mild integrated language deficits and verbal memory deficits. She was OX2 and year but not the month. She had reduced verbal memory, verbal reasoning, verbal problem solving, verbal organization and disorientation to time. Reduced insight into her deficits and current situation. She would like to return home despite falls resulting in back and shoulder injuries. Can not see benefit of having more care in her home due to her reduced insight. Affect is generally flat. Some expressive deficits; omits information when communicating. One incidence of word finding deficits. She could benefit from neuro and neuropsych consults to assess her cognition fully. Skilled acute therapy provided by a speech-language pathologist is not indicated at this time. PLAN :  Recommendations:  neuropsych work up at discharge   Neuro consult to assess mental status while in hospital  Discharge Recommendations: None     SUBJECTIVE:   Patient stated \"What month?  and she did not know. Reported she fell over the cat and broke her back but does not plan to get rid of the cats. OBJECTIVE:     Past Medical History:   Diagnosis Date    Diabetes ([facility]    History reviewed. No pertinent surgical history.   Prior Level of Function/Home Situation:   Home Situation  Home Environment: Private residence  # Steps to Enter: 0  Wheelchair Ramp: No  One/Two Story Residence: One story  Living Alone: Yes  Support Systems: Friends \ neighbors, Family member(s)(2 cats; sister POA; paid cgr q am)  Patient Expects to be Discharged to[de-identified] Unknown  Current DME Used/Available at Home: Cane, straight, Brace/Splint, Grab bars, Shower chair, Walker, rolling  Tub or Shower Type: Shower  Mental Status:  Neurologic State: Alert, Confused, Appropriate for age  Orientation Level: Oriented to person, Oriented to place, Oriented to situation(oriented to year but not month. knew it was [place])  Cognition: Follows commands  Perception: Appears intact  Perseveration: No perseveration noted  Safety/Judgement: Decreased insight into deficits  Motor Speech:  Oral-Motor Structure/Motor Speech  Intelligibility: No impairment  Language Comprehension and Expression:  Auditory Comprehension  Auditory Impairment: No   Cueing type: Repetition  Verbal Expression  Primary Mode of Expression: Verbal  Repetition: No impairment  Conversation: Fluent  Speech Characteristics: Word retrieval        Neuro-Linguistics:        Verbal Problem Solving: Impaired; mild impairment. Could provide solutions to problems appropriately but limited thought flexibility  Verbal Organization: Impaired; listed 10-12 items per large concrete category; mildly impaired    verbal reasoning; impaired; mod impaired. More concrete with reasoning; reduced abstract reasoning   Mathematical: No Impairment; acc with calculation   Memory: Impaired; needed semantic cue to recall 4/4 words. Good storage but reduced recall. Attention : No Impairment; repeated number series, followed 3 step command and repeated a sentence accurately. Pragmatics:  Pragmatics Impairment: No impairment             NOMS:     Pain:  Pain Scale 1: Numeric (0 - 10)  Pain Intensity 1: 0       After treatment:   Patient left in no apparent distress in bed    COMMUNICATION/EDUCATION:   Patient was educated regarding her deficit(s) of integrated language skills.  She has poor insight into deficits and feels she can return to her home safely after rehab despite her falls. The patient's plan of care including recommendations, planned interventions, and recommended diet changes were discussed with: Registered nurse.        Thank you for this referral.  ANT Devine  Time Calculation: 25 mins

## 2021-03-24 NOTE — PROGRESS NOTES
End of Shift Note    Bedside shift change report given to  (oncoming nurse) by Luisa Gosselin (offgoing nurse). Report included the following information SBAR, Kardex, Intake/Output, MAR, Recent Results and Med Rec Status    Shift worked:  0700 - 1930     Shift summary and any significant changes:     none   Concerns for physician to address: none   [facility]   [phone]       Activity:  Activity Level: Up with Assistance  Number times ambulated in hallways past shift: 0  Number of times OOB to chair past shift: 0    Cardiac:   Cardiac Monitoring: Yes      Cardiac Rhythm: Normal sinus rhythm    Access:   Current line(s): PIV     Genitourinary:   Urinary status: external catheter    Respiratory:   O2 Device: Room air  Chronic home O2 use?: NO  Incentive spirometer at bedside: NO     GI:  Last Bowel Movement Date: 03/23/21  Current diet:  DIET DIABETIC CONSISTENT CARB Regular  Passing flatus: YES  Tolerating current diet: YES       Pain Management:   Patient states pain is manageable on current regimen: YES    Skin:  Kwabena Score: 15  Interventions: increase time out of bed    Patient Safety:  Fall Score:  Total Score: 4  Interventions: pt to call before getting OOB  High Fall Risk: Yes    Length of Stay:  Expected LOS: 5d 4h  Actual LOS: [address]

## 2021-03-25 VITALS
SYSTOLIC BLOOD PRESSURE: 130 MMHG | RESPIRATION RATE: 18 BRPM | TEMPERATURE: 98.1 F | DIASTOLIC BLOOD PRESSURE: 63 MMHG | WEIGHT: 150 LBS | BODY MASS INDEX: 23.54 KG/M2 | HEIGHT: 67 IN | OXYGEN SATURATION: 96 % | HEART RATE: 94 BPM

## 2021-03-25 PROBLEM — M54.9 BACK PAIN: Status: RESOLVED | Noted: 2021-03-17 | Resolved: 2021-03-25

## 2021-03-25 LAB
GLUCOSE BLD STRIP.AUTO-MCNC: 142 MG/DL (ref 65–100)
GLUCOSE BLD STRIP.AUTO-MCNC: 162 MG/DL (ref 65–100)
SERVICE CMNT-IMP: ABNORMAL
SERVICE CMNT-IMP: ABNORMAL

## 2021-03-25 PROCEDURE — 74011000250 HC RX REV CODE- 250: Performed by: NEUROLOGICAL SURGERY

## 2021-03-25 PROCEDURE — 74011250637 HC RX REV CODE- 250/637: Performed by: NEUROLOGICAL SURGERY

## 2021-03-25 PROCEDURE — 74011636637 HC RX REV CODE- 636/637: Performed by: NEUROLOGICAL SURGERY

## 2021-03-25 PROCEDURE — 94760 N-INVAS EAR/PLS OXIMETRY 1: CPT

## 2021-03-25 PROCEDURE — 82962 GLUCOSE BLOOD TEST: CPT

## 2021-03-25 RX ORDER — ACETAMINOPHEN 325 MG/1
650 TABLET ORAL
Qty: 30 TAB | Refills: 0 | Status: SHIPPED | OUTPATIENT
Start: 2021-03-25

## 2021-03-25 RX ORDER — TRAMADOL HYDROCHLORIDE 50 MG/1
50 TABLET ORAL
Qty: 30 TAB | Refills: 0 | Status: SHIPPED | OUTPATIENT
Start: 2021-03-25 | End: 2021-04-08

## 2021-03-25 RX ADMIN — TRAMADOL HYDROCHLORIDE 50 MG: 50 TABLET, FILM COATED ORAL at 10:04

## 2021-03-25 RX ADMIN — INSULIN LISPRO 2 UNITS: 100 INJECTION, SOLUTION INTRAVENOUS; SUBCUTANEOUS at 12:18

## 2021-03-25 RX ADMIN — Medication 1 AMPULE: at 09:53

## 2021-03-25 RX ADMIN — ACETAMINOPHEN 650 MG: 325 TABLET, FILM COATED ORAL at 05:30

## 2021-03-25 RX ADMIN — TRAMADOL HYDROCHLORIDE 50 MG: 50 TABLET, FILM COATED ORAL at 01:37

## 2021-03-25 RX ADMIN — DOCUSATE SODIUM 100 MG: 100 CAPSULE, LIQUID FILLED ORAL at 09:53

## 2021-03-25 RX ADMIN — INSULIN LISPRO 2 UNITS: 100 INJECTION, SOLUTION INTRAVENOUS; SUBCUTANEOUS at 09:53

## 2021-03-25 RX ADMIN — POLYETHYLENE GLYCOL 3350 17 G: 17 POWDER, FOR SOLUTION ORAL at 09:53

## 2021-03-25 RX ADMIN — ACETAMINOPHEN 650 MG: 325 TABLET, FILM COATED ORAL at 12:17

## 2021-03-25 RX ADMIN — Medication 10 ML: at 05:31

## 2021-03-25 NOTE — DISCHARGE SUMMARY
Hospitalist Discharge Summary     Patient ID:  Malachi Perla  [id number]  89 y.o.  1945  3/17/2021    PCP on record: Quyen Camacho MD    Admit date: 3/17/2021  Discharge date and time: 3/25/2021    DISCHARGE DIAGNOSIS:    Severe Lumbago   Severe Compression Deformity at T12        Tachycardia - resolved    Fever with Chills post op - resolved   Ruled Out PE          Hypokalemia - resolved      Hypertension        Type II Diabetes       S/p ORIF of Left Humerus Fracture x 5 years    New Limited ROM to Left Arm s/p Fall 2 weeks Ago          CONSULTATIONS:  IP CONSULT TO NEUROSURGERY  IP CONSULT TO HOSPITALIST  IP CONSULT TO ORTHOPEDIC SURGERY    Excerpted HPI from H&P of Jonny Joseph MD:    Malachi Perla is a 77 y.o.  female with a past medical history of hypertension and diabetes mellitus, scented to ED with a chief complaint of back pain. She reported that 2 weeks ago she pulled her back, and since then she has been having lower back pain, which is getting worse. She also complains of muscle spasm on the right hip. Due to the severity of pain she could not ambulate. She went to urgent care, and given steroids, which did not help. She had a CT spine in the ED, which showed compression deformity at T12, neurosurgery was consulted, recommends MRI and may need brace. Patient currently in severe pain, unable to lay flat for a SLRT     We were asked to admit for work up and evaluation of the above problems. ______________________________________________________________________  DISCHARGE SUMMARY/HOSPITAL COURSE:  for full details see H&P, daily progress notes, labs, consult notes. Severe Lumbago   Severe Compression Deformity at T12   CT Spine 3/17             1.  Severe compression deformity at T12, which may be subacute or acute, with 5 mm associated retropulsion of bone at the superior endplate and central stenosis at T11-12.             2. Multilevel degenerative change otherwise with central and foraminal stenosis.           3. Left adrenal adenoma or cyst.             4. Generalized osteopenia with a single focal area of low density in the vertebral body of Z29, of uncertain if any clinical significance. Correlate with clinical history. Further evaluation would require lumbar spine MRI. MRI Thoracic Spine            1. Acute or subacute T12 compression deformity with retropulsion of bone toward the thecal sac, effacing the distal spinal cord.            2. Overall mild thoracic kyphosis and multilevel degenerative change with no other compression deformity. Appreciate Neurosurgery - S/p T10-L2 fusion, decompression of spinal cord at T12 and biopsy of T12 vertebral body POD #3  S/P REMOVAL OF DRAIN 03/23  COVID screening COMPLETED    One dose on Vancomycin 03/23. I discussed with Dr Randolph Oppenheim, input is appreciated  She has been using only Tylenol while in the hospital     Tachycardia - resolved    Fever with Chills post op - resolved   Ruled Out PE  Afebrile now 72 hours   Blood Cultures - NG so far    Lactate WNL   Procalcitonin NEGATIVE    CXR - No acute cardiopulmonary process   CTA negative for PE         Hypokalemia - resolved      Hypertension   She is not on any antihypertensive. Blood pressure has been controlled     Type II Diabetes  History of DM but not on any coverage OP   HgbA1c 5.3%. No need for medications at this point     S/p ORIF of Left Humerus Fracture x 5 years    New Limited ROM to Left Arm s/p Fall 2 weeks Ago   XR Humerus/Forearm - Surgical fixation of left humerus fracture   CXR - Left shoulder dislocation. Right clavicle and upper rib fractures, age indeterminate. Follow up with [facility]   Continue wrist brace            _______________________________________________________________________  Patient seen and examined by me on discharge day. Pertinent Findings:  Gen:    Not in distress  Chest: Clear lungs  CVS:   Regular rhythm.   No edema  Abd:  Soft, not distended, not tender  Neuro:  Alert,   _______________________________________________________________________  DISCHARGE MEDICATIONS:   Current Discharge Medication List      START taking these medications    Details   acetaminophen (TYLENOL) 325 mg tablet Take 2 Tabs by mouth every six (6) hours as needed for Pain. Qty: 30 Tab, Refills: 0               Patient Follow Up Instructions:    Activity: Activity as tolerated  Diet: Resume previous diet  Wound Care: As directed      Follow-up Information     Follow up With Specialties Details Why Contact Juanjose Song MD Neurosurgery Schedule an appointment as soon as possible for a visit in 2 weeks  [facility]      [address] [place] [id number] Darryl Kidd Md  [address] [facility], [address] [place] [address]  [phone]          ________________________________________________________________    Risk of deterioration: Low    Condition at Discharge:  Stable  __________________________________________________________________    Disposition  [facility]    ____________________________________________________________________    Code Status: Full Code  ___________________________________________________________________      Total time in minutes spent coordinating this discharge (includes going over instructions, follow-up, prescriptions, and preparing report for sign off to her PCP) :  >30 minutes    Signed:  Nicole Dahl MD

## 2021-03-25 NOTE — PROGRESS NOTES
Transition of Care Plan to SNF/Rehab    SNF/Rehab Transition:  Patient has been accepted to [facility] and meets criteria for admission. Patient will transported by [facility] and expected to leave at 1:00pm.    Communication to Patient/Family:  Met with patient and sister (identified care giver) and they are agreeable to the transition plan. Communication to SNF/Rehab:  Bedside RN, Joshua Gilbert , has been notified to update the transition plan to the facility and call report (phone number [phone]). Discharge information has been updated on the AVS.     Discharge instructions to be fax'd to facility at [facility] #). Nursing Please include all hard scripts for controlled substances, med rec and dc summary, and AVS in packet. Reviewed and confirmed with facility, [facility], can manage the patient care needs for the following:     Judge Allen with (X) only those applicable:    Medication:  [x]  Medications will be available at the facility  []  IV Antibiotics   [x]  Controlled Substance - hard copy to be sent with patient   []  Weekly Labs   Documents:  [x] Hard RX  [x] MAR  [x] Kardex  [x] AVS  [x]Transfer Summary  [x]Discharge   Equipment:  []  CPAP/BiPAP  []  Wound Vacuum  []  Cabral or Urinary Device  []  PICC/Central Line  []  Nebulizer  []  Ventilator   Treatment:  []Isolation (for MRSA, VRE, etc.)  []Surgical Drain Management  []Tracheostomy Care  []Dressing Changes  []Dialysis with transportation and chair time. []PEG Care  []Oxygen  []Daily Weights for Heart Failure   Dietary:  []Any diet limitations  []Tube Feedings   []Total Parenteral Management (TPN)   Eligible for Medicaid Long Term Services and Supports  Yes:  [] Eligible for medical assistance or will become eligible within 180 days and UAI completed. [] Provider/Patient and/or support system has requested screening. [] UAI copy provided to patient or responsible party. [] UAI unavailable at discharge will send once processed to SNF provider.   [] UAI unavailable at discharged mailed to patient  No:   [] Private pay and is not financially eligible for Medicaid within the next 180 days. [] Reside out-of-state.   [] A residents of a state owned/operated facility that is licensed  by [address] [employer] [facility] or [facility]  [] Enrollment in [facility] hospice services  [] [facility]  [x] Patient /Family declines to have screening completed or provide financial information for screening     Financial Resources:  Medicaid    [] Initiated and application pending   [] Full coverage     Advanced Care Plan:  []Surrogate Decision Maker of Care  []POA  [x]Communicated Code Status full    Other     Jackie Donaldson, [address]

## 2021-03-25 NOTE — PROGRESS NOTES
Transition of Care Plan:  RUR: 12% low  Disposition: SNF referrals sent   Follow up appointments: ortho  DME needed: n/a  Transportation at [address] or means to access home:  n/a      IM Medicare letter: to be provided prior to d/c  Caregiver Contact: sisterKristina [phone]  Discharge Caregiver contacted prior to discharge? Yes    [facility] @ 1:00pm  Call report [phone] going to room 129A    3/25/21 10:30am  Per ortho NP everything is situated with ARIANA brace and has been delivered to patient's room. [facility] arranged for 1:00pm. CM contacted Sussy Olvera at [facility] who confirmed they still have a bed for patient today. CM informed Sussy Olvera that transport is at 1:00pm today, Sussy Olvera agreed with transport time. CM made room visit with patient who was alert and oriented. CM informed patient of d/c and time, patient agreed. Patient signed 2nd IM letter. CM left message for patient's sister confirming d/c for patient today. Medicare pt has received, reviewed, and signed 2nd IM letter informing them of their right to appeal the discharge. Signed copy has been placed on pt bedside chart. Late entry 3/24/21  CM spoke with Sussy Olvera at [facility] who accepted patient for SNF. Per ortho NP patient should be ready tomorrow if  brace is delivered and situated. CM informed Sussy Olvera that anticipate d/c is tomorrow, Sussy Olvera confirmed she can accept patient tomorrow 3/25. CM contacted patient's sister Kristina Owens [phone] to provide update on d/c plan. CM informed sister that the plan is for patient to go to a SNF where she can typically stay at the facility longer than at an inpatient rehab like [facility]. CM informed sister that [facility] has accepted patient and provided contact information to facility. Sister agrees with d/c plan anticipated for tomorrow 3/25.      Carina Frias, [facility]

## 2021-03-25 NOTE — PROGRESS NOTES
Bedside shift change report given to Lang Paz (oncoming nurse) by Jose Love (offgoing nurse). Report included the following information SBAR, Kardex, Intake/Output, MAR and Recent Results.

## 2021-03-25 NOTE — DISCHARGE INSTRUCTIONS
Basil Yi M.D. What can I do?  The only exercise you should do is walking. Start by walking twice a day for 5 minutes, then increase by  2-3 minutes every day until you reach 25 minutes twice a day. DO NOT sit for long periods of time (20 minutes at a time for the first couple of weeks, then gradually increase.  No heavy lifting (5 lbs max); no straining, twisting, or bending.  No driving until your physician tells you it is ok. It is, however, ok to ride short distances in a car.  If you are required to wear a back brace, you may remove it when you are sleeping unless your doctor has advised against it. What can I eat?  You may resume your regular home diet as tolerated. If your throat is sore, you may want to eat soft food for the first few days. When can I take a shower?  You may shower leaving on your occlusive (waterproof) dressing on allowing water to run over the dressing.  Do not take baths or soak in pools.  You may remove your brace for showering. Medications:   Check with your physician before taking any anti-inflammatory medicines (Advil, Aleve, ibuprofen, aspirin).  Take prescription medicine as directed. DO NOT take more than prescribed. Call your physician if the prescribed dose is not sufficiently controlling your pain.  It is important to have regular bowel movements. Pain medications may cause constipation. Drink plenty of fluids, get enough fiber in your diet, and use stool softeners and drink prune juice to help prevent constipation. Do not take laxatives if at all possible except in severe situations. It can result in a vicious cycle of constipation and diarrhea.  Do not put any ointments or creams on your incision unless directed to do so by your physician.  Tobacco products should be avoided during the postoperative phase. When do I see the physician again?    Please call your physicians office as soon as you get home to schedule your 1st post operative appointment. You should be seen approximately two weeks after your surgery. At this appointment you will see your doctors Assistant for a wound check and to answer any questions you may have.  You will see your physician approximately six weeks after your surgery.  If you had a fusion, you will need to get an order for xrays to be taken prior to the six week appointment. These should be done on the day of the appointment or 1-2 days before.  If you need the number to your doctors office, please request it from your nurse or see below. NOTIFY YOUR PHYSICIAN OF ANY OF THE FOLLOWING:     Fever above 101º for 24 hrs.  Nausea or vomiting.  Inability to urinate   Loss of bowel or bladder function (sudden onset of incontinence)   Changes in sensation (numbness, tingling, color change) in your extremities   Pain not relieved by your medicine.    Redness, swelling or drainage from your incision   Persistent pain in the calf of either leg   Development of severe pain      FOR APPOINTMENTS OR QUESTIONS CALL:    Neurosurgical [facility] [address] [place] [address]  [phone]

## 2021-03-26 LAB
BACTERIA SPEC CULT: NORMAL
SERVICE CMNT-IMP: NORMAL

## 2021-03-26 NOTE — PROGRESS NOTES
[facility] present to transport the patient. IV removed. All patient belongigs removed from the room. Patient being transported with AVS, MAR report, H&P, hard RX, hospital to SNF note, and extra optifoam dressing.

## 2021-03-31 ENCOUNTER — PATIENT OUTREACH (OUTPATIENT)
Dept: CASE MANAGEMENT | Age: 76
End: 2021-03-31

## 2021-04-01 NOTE — PROGRESS NOTES
Post Acute Facility Update     *The information contained in this note was received during a weekly care coordination call with the post acute facility*    Current [facility]: [address] [place] [address] [facility]  Anticipated Discharge Date: TBD    Facility Nursing Update: No current updates   Facility Social Work Update: No discharge date. Will return home alone with aide assistance and [facility]. Upper Extremity Assistance: Moderate Assistance   Lower Extremity Assistance: Maximum Assistance  Bed Mobility: Moderate Assistance   Transfers: Minimal Assistance   Ambulation: Minimal Assistance   How far (in feet) is the patient ambulating?  80 ft  Device Used: Cane   Barriers to Discharge: TBCARISSA Coyle MSW  [facility]

## 2021-04-07 ENCOUNTER — PATIENT OUTREACH (OUTPATIENT)
Dept: CASE MANAGEMENT | Age: 76
End: 2021-04-07

## 2021-04-07 NOTE — PROGRESS NOTES
Post Acute Facility Update     *The information contained in this note was received during a weekly care coordination call with the post acute facility*    Current Facility: [address] [place] [address] [facility]  Anticipated Discharge Date: TBD    Facility Nursing Update: No current updates   Facility Social Work Update: No discharge date. Will discharge home alone with assistance and PAUL HAYES [facility]. Upper Extremity Assistance: Stand by Assist - Presence and Cueing  Lower Extremity Assistance: Stand by Assist - Presence and Cueing  Bed Mobility: Stand by Assist - Presence and Cueing  Transfers: Contact Guard Assist - hands on patient for balance   Ambulation: Stand by Assist - Presence and Cueing  How far (in feet) is the patient ambulating?  300 ft   Device Used: Cane   Barriers to Discharge: MARY Perez MSW  [facility]

## 2021-04-14 ENCOUNTER — PATIENT OUTREACH (OUTPATIENT)
Dept: CASE MANAGEMENT | Age: 76
End: 2021-04-14

## 2021-04-14 NOTE — PROGRESS NOTES
Post Acute Facility Update     *The information contained in this note was received during a weekly care coordination call with the post acute facility*    Current Facility: [address] [place] [address] (SNF)  Anticipated Discharge Date: 4/16/2021    Facility Nursing Update: No current updates   Facility Social Work Update: Patient choosing to discharge home alone with assistance on 4/16. [facility]. Upper Extremity Assistance: Independent  Lower Extremity Assistance: Independent  Bed Mobility: Independent  Transfers: Independent  Ambulation: Independent  How far (in feet) is the patient ambulating?  400   Device Used: None   Barriers to Discharge: MARY Henderson MSW  [facility]

## 2021-04-21 ENCOUNTER — PATIENT OUTREACH (OUTPATIENT)
Dept: CASE MANAGEMENT | Age: 76
End: 2021-04-21

## 2021-04-25 NOTE — PROGRESS NOTES
[address] [place] [address] Dr Discharge Note    *The information contained in this note was received during a weekly care coordination call with the post acute facility*      Patient was discharged from [address] [place] [address] ([facility]) on 4/16/2021. PCP: MD JEFF Villegas appointment: scheduled by [facility]/Outpatient orders at discharge: home health care  [address]: [address] ordered at discharge: none  [address] received prior to discharge: [address] [facility] Team will sign off at this time. Medications were not reconciled and general patient assessment was not completed during this skilled nursing facility outreach.

## 2021-04-27 NOTE — PROGRESS NOTES
Physician Progress Note      PATIENT:               Leesa Edgar  CSN #:                  [id number]  :                       1945  ADMIT DATE:       3/17/2021 2:20 PM  [address] DATE:        3/25/2021 2:24 PM  RESPONDING  PROVIDER #:        Diony Jimenez MD          QUERY TEXT:    Pt admitted with Severe lumbago and compression deformity at T12. Pt noted to have tachycardia postop. If possible, please document in progress notes and discharge summary:    The medical record reflects the following:  Risk Factors: 77 F w/hx: HTN  Clinical Indicators: Per 3/20 NP note \"New onset tachycardia\" s/p procedure on 3/19, admit HR: , postop HR: 111-139  Treatment: Metoprolol IV PRN, cardiac monitoring, blood cultures, CXR, CTA chest    Please call with questions. Thank you. [email or web address]  [phone]  Options provided:  -- Tachycardia as a postoperative complication  -- Tachycardia as an expected/inherent condition that occurred postoperatively and not a complication  -- Tachycardia related to other incidental risk factor (please specify) occurring following surgery and not a complication, Please document incidental risk factor. -- Other - I will add my own diagnosis  -- Disagree - Not applicable / Not valid  -- Disagree - Clinically unable to determine / Unknown  -- Refer to Clinical Documentation Reviewer    PROVIDER RESPONSE TEXT:    Patient has tachycardia as a postoperative complication.     Query created by: Goyo Serna on 2021 10:33 AM      Electronically signed by:  Diony Jimenez MD 2021 10:52 AM

## 2022-03-19 PROBLEM — S22.000A COMPRESSION FRACTURE OF BODY OF THORACIC VERTEBRA (HCC): Status: ACTIVE | Noted: 2021-03-17

## 2023-03-13 ENCOUNTER — HOSPITAL ENCOUNTER (INPATIENT)
Age: 78
LOS: 3 days | Discharge: NURSING, REHAB OR LONG-TERM CARE | DRG: 482 | End: 2023-03-16
Attending: EMERGENCY MEDICINE | Admitting: INTERNAL MEDICINE
Payer: MEDICARE

## 2023-03-13 ENCOUNTER — APPOINTMENT (OUTPATIENT)
Dept: GENERAL RADIOLOGY | Age: 78
DRG: 482 | End: 2023-03-13
Attending: ORTHOPAEDIC SURGERY
Payer: MEDICARE

## 2023-03-13 ENCOUNTER — APPOINTMENT (OUTPATIENT)
Dept: GENERAL RADIOLOGY | Age: 78
DRG: 482 | End: 2023-03-13
Attending: EMERGENCY MEDICINE
Payer: MEDICARE

## 2023-03-13 ENCOUNTER — ANESTHESIA EVENT (OUTPATIENT)
Dept: SURGERY | Age: 78
DRG: 482 | End: 2023-03-13
Payer: MEDICARE

## 2023-03-13 ENCOUNTER — APPOINTMENT (OUTPATIENT)
Dept: CT IMAGING | Age: 78
DRG: 482 | End: 2023-03-13
Attending: EMERGENCY MEDICINE
Payer: MEDICARE

## 2023-03-13 DIAGNOSIS — S72.001A CLOSED FRACTURE OF RIGHT HIP, INITIAL ENCOUNTER (HCC): Primary | ICD-10-CM

## 2023-03-13 DIAGNOSIS — S72.001A CLOSED RIGHT HIP FRACTURE, INITIAL ENCOUNTER (HCC): ICD-10-CM

## 2023-03-13 PROBLEM — M85.80 OSTEOPENIA: Status: ACTIVE | Noted: 2022-01-20

## 2023-03-13 PROBLEM — S22.42XA MULTIPLE CLOSED FRACTURES OF RIBS OF LEFT SIDE: Status: ACTIVE | Noted: 2022-01-19
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
## 2023-03-13 PROBLEM — E78.49 OTHER HYPERLIPIDEMIA: Status: ACTIVE | Noted: 2022-01-20

## 2023-03-13 PROBLEM — S50.00XA TRAUMATIC HEMATOMA OF ELBOW: Status: ACTIVE | Noted: 2022-01-20

## 2023-03-13 PROBLEM — I10 HYPERTENSION: Status: ACTIVE | Noted: 2020-12-17

## 2023-03-13 PROBLEM — S42.009A CLAVICLE FRACTURE: Status: ACTIVE | Noted: 2022-01-20

## 2023-03-13 LAB
ABO + RH BLD: NORMAL
ALBUMIN SERPL-MCNC: 3.6 G/DL (ref 3.5–5)
ALBUMIN/GLOB SERPL: 1.1 (ref 1.1–2.2)
ALP SERPL-CCNC: 76 U/L (ref 45–117)
ALT SERPL-CCNC: 42 U/L (ref 12–78)
ANION GAP SERPL CALC-SCNC: 9 MMOL/L (ref 5–15)
APPEARANCE UR: CLEAR
AST SERPL-CCNC: 40 U/L (ref 15–37)
BACTERIA URNS QL MICRO: NEGATIVE /HPF
BASOPHILS # BLD: 0 K/UL (ref 0–0.1)
BASOPHILS NFR BLD: 0 % (ref 0–1)
BILIRUB SERPL-MCNC: 0.6 MG/DL (ref 0.2–1)
BILIRUB UR QL: NEGATIVE
BLOOD GROUP ANTIBODIES SERPL: NORMAL
BUN SERPL-MCNC: 19 MG/DL (ref 6–20)
BUN/CREAT SERPL: 23 (ref 12–20)
CALCIUM SERPL-MCNC: 8.8 MG/DL (ref 8.5–10.1)
CHLORIDE SERPL-SCNC: 108 MMOL/L (ref 97–108)
CK SERPL-CCNC: 623 U/L (ref 26–192)
CO2 SERPL-SCNC: 25 MMOL/L (ref 21–32)
COLOR UR: ABNORMAL
COMMENT, HOLDF: NORMAL
CREAT SERPL-MCNC: 0.84 MG/DL (ref 0.55–1.02)
DIFFERENTIAL METHOD BLD: ABNORMAL
EOSINOPHIL # BLD: 0 K/UL (ref 0–0.4)
EOSINOPHIL NFR BLD: 0 % (ref 0–7)
EPITH CASTS URNS QL MICRO: ABNORMAL /LPF
ERYTHROCYTE [DISTWIDTH] IN BLOOD BY AUTOMATED COUNT: 12.1 % (ref 11.5–14.5)
GLOBULIN SER CALC-MCNC: 3.3 G/DL (ref 2–4)
GLUCOSE SERPL-MCNC: 158 MG/DL (ref 65–100)
GLUCOSE UR STRIP.AUTO-MCNC: NEGATIVE MG/DL
HCT VFR BLD AUTO: 36.7 % (ref 35–47)
HGB BLD-MCNC: 12.4 G/DL (ref 11.5–16)
HGB UR QL STRIP: ABNORMAL
HYALINE CASTS URNS QL MICRO: ABNORMAL /LPF (ref 0–2)
IMM GRANULOCYTES # BLD AUTO: 0.1 K/UL (ref 0–0.04)
IMM GRANULOCYTES NFR BLD AUTO: 1 % (ref 0–0.5)
INR PPP: 0.9 (ref 0.9–1.1)
KETONES UR QL STRIP.AUTO: 15 MG/DL
LEUKOCYTE ESTERASE UR QL STRIP.AUTO: NEGATIVE
LYMPHOCYTES # BLD: 0.5 K/UL (ref 0.8–3.5)
LYMPHOCYTES NFR BLD: 4 % (ref 12–49)
MCH RBC QN AUTO: 36.2 PG (ref 26–34)
MCHC RBC AUTO-ENTMCNC: 33.8 G/DL (ref 30–36.5)
MCV RBC AUTO: 107 FL (ref 80–99)
MONOCYTES # BLD: 0.7 K/UL (ref 0–1)
MONOCYTES NFR BLD: 5 % (ref 5–13)
NEUTS SEG # BLD: 11.9 K/UL (ref 1.8–8)
NEUTS SEG NFR BLD: 90 % (ref 32–75)
NITRITE UR QL STRIP.AUTO: NEGATIVE
NRBC # BLD: 0 K/UL (ref 0–0.01)
NRBC BLD-RTO: 0 PER 100 WBC
PH UR STRIP: 5 (ref 5–8)
PLATELET # BLD AUTO: 234 K/UL (ref 150–400)
PMV BLD AUTO: 8.8 FL (ref 8.9–12.9)
POTASSIUM SERPL-SCNC: 3.9 MMOL/L (ref 3.5–5.1)
PROT SERPL-MCNC: 6.9 G/DL (ref 6.4–8.2)
PROT UR STRIP-MCNC: 30 MG/DL
PROTHROMBIN TIME: 9.9 SEC (ref 9–11.1)
RBC # BLD AUTO: 3.43 M/UL (ref 3.8–5.2)
RBC #/AREA URNS HPF: ABNORMAL /HPF (ref 0–5)
RBC MORPH BLD: ABNORMAL
SAMPLES BEING HELD,HOLD: NORMAL
SODIUM SERPL-SCNC: 142 MMOL/L (ref 136–145)
SP GR UR REFRACTOMETRY: 1.02
SPECIMEN EXP DATE BLD: NORMAL
UA: UC IF INDICATED,UAUC: ABNORMAL
UROBILINOGEN UR QL STRIP.AUTO: 0.2 EU/DL (ref 0.2–1)
WBC # BLD AUTO: 13.2 K/UL (ref 3.6–11)
WBC URNS QL MICRO: ABNORMAL /HPF (ref 0–4)

## 2023-03-13 PROCEDURE — 99285 EMERGENCY DEPT VISIT HI MDM: CPT

## 2023-03-13 PROCEDURE — 74011250636 HC RX REV CODE- 250/636: Performed by: STUDENT IN AN ORGANIZED HEALTH CARE EDUCATION/TRAINING PROGRAM

## 2023-03-13 PROCEDURE — 93005 ELECTROCARDIOGRAM TRACING: CPT

## 2023-03-13 PROCEDURE — 85025 COMPLETE CBC W/AUTO DIFF WBC: CPT

## 2023-03-13 PROCEDURE — 65270000046 HC RM TELEMETRY

## 2023-03-13 PROCEDURE — 70450 CT HEAD/BRAIN W/O DYE: CPT

## 2023-03-13 PROCEDURE — 71045 X-RAY EXAM CHEST 1 VIEW: CPT

## 2023-03-13 PROCEDURE — 74011000250 HC RX REV CODE- 250: Performed by: STUDENT IN AN ORGANIZED HEALTH CARE EDUCATION/TRAINING PROGRAM

## 2023-03-13 PROCEDURE — 80053 COMPREHEN METABOLIC PANEL: CPT

## 2023-03-13 PROCEDURE — 74011250637 HC RX REV CODE- 250/637: Performed by: ORTHOPAEDIC SURGERY

## 2023-03-13 PROCEDURE — 86900 BLOOD TYPING SEROLOGIC ABO: CPT

## 2023-03-13 PROCEDURE — 82550 ASSAY OF CK (CPK): CPT

## 2023-03-13 PROCEDURE — 73552 X-RAY EXAM OF FEMUR 2/>: CPT

## 2023-03-13 PROCEDURE — 85610 PROTHROMBIN TIME: CPT

## 2023-03-13 PROCEDURE — 36415 COLL VENOUS BLD VENIPUNCTURE: CPT

## 2023-03-13 PROCEDURE — 74011250636 HC RX REV CODE- 250/636: Performed by: EMERGENCY MEDICINE

## 2023-03-13 PROCEDURE — 81001 URINALYSIS AUTO W/SCOPE: CPT

## 2023-03-13 PROCEDURE — 73502 X-RAY EXAM HIP UNI 2-3 VIEWS: CPT

## 2023-03-13 RX ORDER — ONDANSETRON 2 MG/ML
4 INJECTION INTRAMUSCULAR; INTRAVENOUS
Status: DISCONTINUED | OUTPATIENT
Start: 2023-03-13 | End: 2023-03-16 | Stop reason: HOSPADM

## 2023-03-13 RX ORDER — ONDANSETRON 4 MG/1
4 TABLET, ORALLY DISINTEGRATING ORAL
Status: DISCONTINUED | OUTPATIENT
Start: 2023-03-13 | End: 2023-03-16 | Stop reason: HOSPADM

## 2023-03-13 RX ORDER — ROSUVASTATIN CALCIUM 5 MG/1
1 TABLET, COATED ORAL DAILY
COMMUNITY
Start: 2023-01-30

## 2023-03-13 RX ORDER — MORPHINE SULFATE 2 MG/ML
2 INJECTION, SOLUTION INTRAMUSCULAR; INTRAVENOUS
Status: COMPLETED | OUTPATIENT
Start: 2023-03-13 | End: 2023-03-13

## 2023-03-13 RX ORDER — ACETAMINOPHEN 650 MG/1
650 SUPPOSITORY RECTAL
Status: DISCONTINUED | OUTPATIENT
Start: 2023-03-13 | End: 2023-03-16 | Stop reason: HOSPADM

## 2023-03-13 RX ORDER — RAMIPRIL 5 MG/1
1 CAPSULE ORAL
COMMUNITY
Start: 2023-01-16

## 2023-03-13 RX ORDER — ROSUVASTATIN CALCIUM 10 MG/1
5 TABLET, COATED ORAL DAILY
Status: DISCONTINUED | OUTPATIENT
Start: 2023-03-14 | End: 2023-03-16 | Stop reason: HOSPADM

## 2023-03-13 RX ORDER — NALOXONE HYDROCHLORIDE 0.4 MG/ML
0.4 INJECTION, SOLUTION INTRAMUSCULAR; INTRAVENOUS; SUBCUTANEOUS AS NEEDED
Status: DISCONTINUED | OUTPATIENT
Start: 2023-03-13 | End: 2023-03-16 | Stop reason: HOSPADM

## 2023-03-13 RX ORDER — OXYCODONE HYDROCHLORIDE 5 MG/1
5 TABLET ORAL
Status: DISCONTINUED | OUTPATIENT
Start: 2023-03-13 | End: 2023-03-16 | Stop reason: HOSPADM

## 2023-03-13 RX ORDER — ALENDRONATE SODIUM 70 MG/1
1 TABLET ORAL
COMMUNITY
Start: 2023-02-14

## 2023-03-13 RX ORDER — DULOXETIN HYDROCHLORIDE 30 MG/1
1 CAPSULE, DELAYED RELEASE ORAL DAILY
COMMUNITY
Start: 2023-02-12

## 2023-03-13 RX ORDER — OXYCODONE HYDROCHLORIDE 5 MG/1
2.5 TABLET ORAL
Status: DISCONTINUED | OUTPATIENT
Start: 2023-03-13 | End: 2023-03-16 | Stop reason: HOSPADM

## 2023-03-13 RX ORDER — ACETAMINOPHEN 325 MG/1
650 TABLET ORAL
Status: DISCONTINUED | OUTPATIENT
Start: 2023-03-13 | End: 2023-03-16 | Stop reason: HOSPADM

## 2023-03-13 RX ORDER — ASPIRIN 81 MG/1
81 TABLET ORAL DAILY
Status: DISCONTINUED | OUTPATIENT
Start: 2023-03-14 | End: 2023-03-14

## 2023-03-13 RX ORDER — ACETAMINOPHEN 325 MG/1
650 TABLET ORAL EVERY 6 HOURS
Status: DISCONTINUED | OUTPATIENT
Start: 2023-03-13 | End: 2023-03-16 | Stop reason: HOSPADM

## 2023-03-13 RX ORDER — SODIUM CHLORIDE 0.9 % (FLUSH) 0.9 %
5-40 SYRINGE (ML) INJECTION EVERY 8 HOURS
Status: DISCONTINUED | OUTPATIENT
Start: 2023-03-13 | End: 2023-03-15

## 2023-03-13 RX ORDER — LISINOPRIL 10 MG/1
5 TABLET ORAL DAILY
Status: DISCONTINUED | OUTPATIENT
Start: 2023-03-14 | End: 2023-03-16 | Stop reason: HOSPADM

## 2023-03-13 RX ORDER — SODIUM CHLORIDE 0.9 % (FLUSH) 0.9 %
5-40 SYRINGE (ML) INJECTION AS NEEDED
Status: DISCONTINUED | OUTPATIENT
Start: 2023-03-13 | End: 2023-03-16 | Stop reason: HOSPADM

## 2023-03-13 RX ORDER — AMOXICILLIN 250 MG
2 CAPSULE ORAL 2 TIMES DAILY
Status: DISCONTINUED | OUTPATIENT
Start: 2023-03-13 | End: 2023-03-14 | Stop reason: SDUPTHER

## 2023-03-13 RX ORDER — ASPIRIN 81 MG/1
81 TABLET ORAL DAILY
COMMUNITY

## 2023-03-13 RX ORDER — POLYETHYLENE GLYCOL 3350 17 G/17G
17 POWDER, FOR SOLUTION ORAL DAILY PRN
Status: DISCONTINUED | OUTPATIENT
Start: 2023-03-13 | End: 2023-03-16 | Stop reason: HOSPADM

## 2023-03-13 RX ORDER — DULOXETIN HYDROCHLORIDE 30 MG/1
30 CAPSULE, DELAYED RELEASE ORAL DAILY
Status: DISCONTINUED | OUTPATIENT
Start: 2023-03-14 | End: 2023-03-16 | Stop reason: HOSPADM

## 2023-03-13 RX ORDER — SODIUM CHLORIDE 0.9 % (FLUSH) 0.9 %
5-40 SYRINGE (ML) INJECTION AS NEEDED
Status: DISCONTINUED | OUTPATIENT
Start: 2023-03-13 | End: 2023-03-15

## 2023-03-13 RX ORDER — SODIUM CHLORIDE 9 MG/ML
75 INJECTION, SOLUTION INTRAVENOUS CONTINUOUS
Status: DISCONTINUED | OUTPATIENT
Start: 2023-03-13 | End: 2023-03-16 | Stop reason: HOSPADM

## 2023-03-13 RX ADMIN — OXYCODONE HYDROCHLORIDE 5 MG: 5 TABLET ORAL at 23:12

## 2023-03-13 RX ADMIN — SODIUM CHLORIDE, PRESERVATIVE FREE 10 ML: 5 INJECTION INTRAVENOUS at 20:09

## 2023-03-13 RX ADMIN — MORPHINE SULFATE 2 MG: 2 INJECTION, SOLUTION INTRAMUSCULAR; INTRAVENOUS at 12:28

## 2023-03-13 RX ADMIN — SODIUM CHLORIDE 1000 ML: 9 INJECTION, SOLUTION INTRAVENOUS at 12:29

## 2023-03-13 RX ADMIN — SODIUM CHLORIDE 75 ML/HR: 9 INJECTION, SOLUTION INTRAVENOUS at 18:07

## 2023-03-13 RX ADMIN — ACETAMINOPHEN 325MG 650 MG: 325 TABLET ORAL at 20:08

## 2023-03-13 RX ADMIN — MORPHINE SULFATE 2 MG: 2 INJECTION, SOLUTION INTRAMUSCULAR; INTRAVENOUS at 16:38

## 2023-03-13 RX ADMIN — SENNOSIDES AND DOCUSATE SODIUM 2 TABLET: 50; 8.6 TABLET ORAL at 16:38

## 2023-03-13 RX ADMIN — ACETAMINOPHEN 325MG 650 MG: 325 TABLET ORAL at 16:38

## 2023-03-13 RX ADMIN — OXYCODONE HYDROCHLORIDE 5 MG: 5 TABLET ORAL at 20:08

## 2023-03-13 NOTE — ED PROVIDER NOTES
[facility] [address]       Pt Name: Benjamín Martinez  MRN: [id number]  [place] 1945  Date of evaluation: 3/13/2023  Provider: Brit Nunez MD   PCP: Clementine Phalen, MD  Note Started: 4:29 PM 3/13/23     CHIEF COMPLAINT       Chief Complaint   Patient presents with    Fall     GLF after tripping at 2000. Has been on the ground since. Denies LOC or thinners. Pt baseline uses cane to ambulate and was not using. C/o right leg pain. Pt with bruising to right side of face, elbow         HISTORY OF PRESENT ILLNESS: 1 or more elements      History From: patient, History limited by: none     Benjamín Martinez is a 69 y.o. female who presents with a chief complaint of right lower extremity pain after a ground-level fall last night. Please See MDM for Additional Details of the HPI/PMH  Nursing Notes were all reviewed and agreed with or any disagreements were addressed in the HPI. REVIEW OF SYSTEMS        Positives and Pertinent negatives as per HPI. PAST HISTORY     Past Medical History:  Past Medical History:   Diagnosis Date    Diabetes [facility])        Past Surgical History:  No past surgical history on file. Family History:  No family history on file. Social History:  Social History     Tobacco Use    Smoking status: Former     Packs/day: 0.50     Years: 50.00     Pack years: 25.00     Types: Cigarettes    Smokeless tobacco: Never    Tobacco comments:     quit about    month ago     Vaping Use    Vaping Use: Never used   Substance Use Topics    Alcohol use: Yes    Drug use: Yes     Types: Prescription, OTC       Allergies: Allergies   Allergen Reactions    Pcn [Penicillins] Hives       CURRENT MEDICATIONS      Current Discharge Medication List        CONTINUE these medications which have NOT CHANGED    Details   alendronate (FOSAMAX) 70 mg tablet Take 1 Tablet by mouth every seven (7) days. aspirin delayed-release 81 mg tablet Take 81 mg by mouth daily. DULoxetine (CYMBALTA) 30 mg capsule Take 1 Capsule by mouth daily. ramipriL (ALTACE) 5 mg capsule Take 1 Capsule by mouth. rosuvastatin (CRESTOR) 5 mg tablet Take 1 Tablet by mouth daily. acetaminophen (TYLENOL) 325 mg tablet Take 2 Tabs by mouth every six (6) hours as needed for Pain. Qty: 30 Tab, Refills: 0             SCREENINGS               No data recorded         PHYSICAL EXAM      ED Triage Vitals [03/13/23 1009]   ED Encounter Vitals Group      BP (!) 171/70      Pulse (Heart Rate) (!) 102      Resp Rate 19      Temp 97.8 °F (36.6 °C)      Temp src       O2 Sat (%) 100 %      Weight 140 lb      Height 5' 7\"        Physical Exam  Vitals and nursing note reviewed. Constitutional:       General: She is not in acute distress. Appearance: She is well-developed. HENT:      Head: Normocephalic. Comments: Ecchymosis over the right cheek and forehead  Eyes:      Conjunctiva/sclera: Conjunctivae normal.      Pupils: Pupils are equal, round, and reactive to light. Cardiovascular:      Rate and Rhythm: Regular rhythm. Tachycardia present. Pulmonary:      Effort: Pulmonary effort is normal. No respiratory distress. Breath sounds: Normal breath sounds. No stridor. Abdominal:      General: There is no distension. Palpations: Abdomen is soft. Tenderness: There is no abdominal tenderness. Musculoskeletal:      Cervical back: Normal range of motion. Comments: Generalized tenderness to palpation over the right hip, pain with any range of motion. Distally neurovascular intact. Ecchymosis over the right elbow with normal range of motion   Skin:     General: Skin is warm and dry. Neurological:      Mental Status: She is alert and oriented to person, place, and time.    Psychiatric:         Mood and Affect: Mood normal.        DIAGNOSTIC RESULTS   LABS:     Recent Results (from the past 12 hour(s))   CBC WITH AUTOMATED DIFF    Collection Time: 03/13/23 10:10 AM Result Value Ref Range    WBC 13.2 (H) 3.6 - 11.0 K/uL    RBC 3.43 (L) 3.80 - 5.20 M/uL    HGB 12.4 11.5 - 16.0 g/dL    HCT 36.7 35.0 - 47.0 %    .0 (H) 80.0 - 99.0 FL    MCH 36.2 (H) 26.0 - 34.0 PG    MCHC 33.8 30.0 - 36.5 g/dL    RDW 12.1 11.5 - 14.5 %    PLATELET 525 178 - 636 K/uL    MPV 8.8 (L) 8.9 - 12.9 FL    NRBC 0.0 0  WBC    ABSOLUTE NRBC 0.00 0.00 - 0.01 K/uL    NEUTROPHILS 90 (H) 32 - 75 %    LYMPHOCYTES 4 (L) 12 - 49 %    MONOCYTES 5 5 - 13 %    EOSINOPHILS 0 0 - 7 %    BASOPHILS 0 0 - 1 %    IMMATURE GRANULOCYTES 1 (H) 0.0 - 0.5 %    ABS. NEUTROPHILS 11.9 (H) 1.8 - 8.0 K/UL    ABS. LYMPHOCYTES 0.5 (L) 0.8 - 3.5 K/UL    ABS. MONOCYTES 0.7 0.0 - 1.0 K/UL    ABS. EOSINOPHILS 0.0 0.0 - 0.4 K/UL    ABS. BASOPHILS 0.0 0.0 - 0.1 K/UL    ABS. IMM. GRANS. 0.1 (H) 0.00 - 0.04 K/UL    DF SMEAR SCANNED      RBC COMMENTS MACROCYTOSIS  1+       METABOLIC PANEL, COMPREHENSIVE    Collection Time: 03/13/23 10:10 AM   Result Value Ref Range    Sodium 142 136 - 145 mmol/L    Potassium 3.9 3.5 - 5.1 mmol/L    Chloride 108 97 - 108 mmol/L    CO2 25 21 - 32 mmol/L    Anion gap 9 5 - 15 mmol/L    Glucose 158 (H) 65 - 100 mg/dL    BUN 19 6 - 20 MG/DL    Creatinine 0.84 0.55 - 1.02 MG/DL    BUN/Creatinine ratio 23 (H) 12 - 20      eGFR >60 >60 ml/min/1.73m2    Calcium 8.8 8.5 - 10.1 MG/DL    Bilirubin, total 0.6 0.2 - 1.0 MG/DL    ALT (SGPT) 42 12 - 78 U/L    AST (SGOT) 40 (H) 15 - 37 U/L    Alk.  phosphatase 76 45 - 117 U/L    Protein, total 6.9 6.4 - 8.2 g/dL    Albumin 3.6 3.5 - 5.0 g/dL    Globulin 3.3 2.0 - 4.0 g/dL    A-G Ratio 1.1 1.1 - 2.2     CK    Collection Time: 03/13/23 10:10 AM   Result Value Ref Range     (H) 26 - 192 U/L   SAMPLES BEING HELD    Collection Time: 03/13/23 10:11 AM   Result Value Ref Range    SAMPLES BEING HELD SST,RED,BLUE,DRKGRN     COMMENT        Add-on orders for these samples will be processed based on acceptable specimen integrity and analyte stability, which may vary by analyte. PROTHROMBIN TIME + INR    Collection Time: 03/13/23 10:11 AM   Result Value Ref Range    INR 0.9 0.9 - 1.1      Prothrombin time 9.9 9.0 - 11.1 sec   URINALYSIS W/ REFLEX CULTURE    Collection Time: 03/13/23 11:29 AM    Specimen: Urine   Result Value Ref Range    Color YELLOW/STRAW      Appearance CLEAR CLEAR      Specific gravity 1.017      pH (UA) 5.0 5.0 - 8.0      Protein 30 (A) NEG mg/dL    Glucose Negative NEG mg/dL    Ketone 15 (A) NEG mg/dL    Bilirubin Negative NEG      Blood LARGE (A) NEG      Urobilinogen 0.2 0.2 - 1.0 EU/dL    Nitrites Negative NEG      Leukocyte Esterase Negative NEG      UA:UC IF INDICATED CULTURE NOT INDICATED BY UA RESULT CNI      WBC 0-4 0 - 4 /hpf    RBC 5-10 0 - 5 /hpf    Epithelial cells FEW FEW /lpf    Bacteria Negative NEG /hpf    Hyaline cast 0-2 0 - 2 /lpf   TYPE & SCREEN    Collection Time: 03/13/23 11:45 AM   Result Value Ref Range    Crossmatch Expiration 03/16/2023,2359     ABO/Rh(D) B POSITIVE     Antibody screen NEG    EKG, 12 LEAD, INITIAL    Collection Time: 03/13/23 11:58 AM   Result Value Ref Range    Ventricular Rate 103 BPM    Atrial Rate 103 BPM    P-R Interval 130 ms    QRS Duration 66 ms    Q-T Interval 346 ms    QTC Calculation (Bezet) 453 ms    Calculated P Axis 74 degrees    Calculated R Axis 43 degrees    Calculated T Axis 75 degrees    Diagnosis       Sinus tachycardia  Nonspecific ST and T wave abnormality  When compared with ECG of 19-MAR-2021 04:35,  ST now depressed in Inferior leads          EKG: If performed, independent interpretation documented below in the MDM section     RADIOLOGY:  Non-plain film images such as CT, Ultrasound and MRI are read by the radiologist. Plain radiographic images are visualized and preliminarily interpreted by the ED Provider with the findings documented in the MDM section.      Interpretation per the Radiologist below, if available at the time of this note:     XR CHEST SNGL V    Result Date: 3/13/2023  Clinical history: fall INDICATION:   fall COMPARISON: 3/21/2021 FINDINGS: AP portable upright view of the chest demonstrates a stable  cardiopericardial silhouette. There is no pleural effusion. .There is no focal consolidation. .Left shoulder arthroplasty. Stabilization hardware in the lower thoracic and upper lumbar spines. Minimal aortic atherosclerotic change. . Patient is on a cardiac monitor. No acute intrathoracic process is identified. XR HIP RT W OR WO PELV 2-3 VWS    Result Date: 3/13/2023  EXAM: XR HIP RT W OR WO PELV 2-3 VWS History: Fall, hip pain INDICATION: fall, hip pain. COMPARISON: None. FINDINGS: AP view of the pelvis and a frogleg lateral view of the right hip demonstrate displaced basicervical intratrochanteric fracture. .     Mildly displaced intertrochanteric fracture. .    XR FEMUR RT 2 VS    Result Date: 3/13/2023  EXAM: XR FEMUR RT 2 VS INDICATION: Preop. COMPARISON: Right hip radiographs from 3/13/2023 FINDINGS: Two views of the right femur demonstrate a mildly displaced intertrochanteric fracture. No other acute fracture or dislocation is seen. Mildly displaced intertrochanteric right hip fracture. CT HEAD WO CONT    Result Date: 3/13/2023  INDICATION:   fall EXAMINATION:  CT HEAD WO CONTRAST COMPARISON:  None TECHNIQUE:  Routine noncontrast axial head CT was performed. Sagittal and coronal reconstructions were generated. CT dose reduction was achieved through use of a standardized protocol tailored for this examination and automatic exposure control for dose modulation. FINDINGS: Ventricles:  Midline, no hydrocephalus. Intracranial Hemorrhage:  None. Brain Parenchyma/Brainstem:  Normal for age. Basal Cisterns:  Normal. Paranasal Sinuses:  Visualized sinuses are clear. Soft Tissues:  Left frontal scalp swelling. Right facial swelling. Osseous Structures:  No acute fracture. Additional Comments:  N/A. Right facial and left frontal soft tissue swelling.  No acute intracranial hemorrhage.         PROCEDURES   Unless otherwise noted below, none  EKG    Date/Time: 3/13/2023 4:31 PM  Performed by: Dawn Nicholas MD  Authorized by: Dawn Nicholas MD     ECG reviewed by ED Physician in the absence of a cardiologist: yes    Interpretation:     Interpretation: non-specific    Rate:     ECG rate:  103    ECG rate assessment: normal    Rhythm:     Rhythm: sinus tachycardia    QRS:     QRS axis:  Normal    QRS intervals:  Normal    QRS conduction: normal    ST segments:     ST segments:  Normal  T waves:     T waves: normal       CRITICAL CARE TIME   0    EMERGENCY DEPARTMENT COURSE and DIFFERENTIAL DIAGNOSIS/MDM   Vitals:    Vitals:    03/13/23 1202 03/13/23 1302 03/13/23 1400 03/13/23 1505   BP: (!) 165/84 (!) 155/59 (!) 144/66 (!) 159/70   Pulse: (!) 106 (!) 103 (!) 103 (!) 110   Resp: 25 26 20 16   Temp:    99 °F (37.2 °C)   SpO2: 98% 93% 94% 96%   Weight:       Height:            Patient was given the following medications:  Medications   morphine injection 2 mg (2 mg IntraVENous Given 3/13/23 1228)   acetaminophen (TYLENOL) tablet 650 mg (has no administration in time range)   oxyCODONE IR (ROXICODONE) tablet 2.5 mg (has no administration in time range)   oxyCODONE IR (ROXICODONE) tablet 5 mg (has no administration in time range)   naloxone (NARCAN) injection 0.4 mg (has no administration in time range)   senna-docusate (PERICOLACE) 8.6-50 mg per tablet 2 Tablet (has no administration in time range)   sodium chloride (NS) flush 5-40 mL (has no administration in time range)   aspirin delayed-release tablet 81 mg (has no administration in time range)   DULoxetine (CYMBALTA) capsule 30 mg (has no administration in time range)   lisinopriL (PRINIVIL, ZESTRIL) tablet 5 mg (has no administration in time range)   rosuvastatin (CRESTOR) tablet 5 mg (has no administration in time range)   0.9% sodium chloride infusion (has no administration in time range)   sodium chloride (NS) flush 5-40 mL (has no administration in time range)   sodium chloride (NS) flush 5-40 mL (has no administration in time range)   acetaminophen (TYLENOL) tablet 650 mg (has no administration in time range)     Or   acetaminophen (TYLENOL) suppository 650 mg (has no administration in time range)   polyethylene glycol (MIRALAX) packet 17 g (has no administration in time range)   ondansetron (ZOFRAN ODT) tablet 4 mg (has no administration in time range)     Or   ondansetron (ZOFRAN) injection 4 mg (has no administration in time range)   sodium chloride 0.9 % bolus infusion 1,000 mL (1,000 mL IntraVENous New Bag 3/13/23 2768)       Medical Decision Making  78-year-old female with a history of diabetes who presents with a chief complaint of right lower extremity pain after ground-level fall. Patient states that she lost her balance yesterday evening and fell, landing on the ground. She denies hitting her head or loss of consciousness. States she was unable to get up and unable to get to her phone or her medic alert band until this morning when she was able to drive the medic alert button off of the counter by grabbing the charging cord. She denies any chest pain, palpitations, shortness of breath preceding the fall. On exam she is overall nontoxic-appearing, mildly tachycardic with otherwise stable vital signs. She has tenderness over the right hip with limited range of motion secondary to pain though has no obvious leg length discrepancy. Concern for hip fracture. Additionally she does have some bruising over the right side of her face even though as she denies hitting her head. Will check CT head to rule out acute intracranial process related to the fall. Will check x-ray of right hip and pelvis to rule out fracture. We will send screening lab work to rule out severe electrolyte derangements or anemia. Also check a CK as she was on the ground for a prolonged period of time.     Problems Addressed:  Closed right hip fracture, initial encounter [facility]): acute illness or injury    Amount and/or Complexity of Data Reviewed  Labs: ordered. Decision-making details documented in ED Course. Radiology: ordered and independent interpretation performed. Decision-making details documented in ED Course. ECG/medicine tests: ordered and independent interpretation performed. Decision-making details documented in ED Course. Risk  Prescription drug management. Decision regarding hospitalization. ED Course as of 03/13/23 1629   Mon Mar 13, 2023   1120 X-ray with right hip fracture per my interpretation [GEO]      ED Course User Index  Isra Taylor MD     Discussed with BRENDA Miller for orthopedics. Discussed with Dr. Jenifer Ambriz, hospitalist, for admission    FINAL IMPRESSION     1. Closed right hip fracture, initial encounter [facility])          DISPOSITION/PLAN       CLINICAL IMPRESSION    Admit Note: Pt is being admitted by Dr. Jenifer Ambriz. The results of their tests and reason(s) for their admission have been discussed with pt and/or available family. They convey agreement and understanding for the need to be admitted and for the admission diagnosis. I am the Primary Clinician of Record. Colby Aragon MD (electronically signed)    (Please note that parts of this dictation were completed with voice recognition software. Quite often unanticipated grammatical, syntax, homophones, and other interpretive errors are inadvertently transcribed by the computer software. Please disregards these errors.  Please excuse any errors that have escaped final proofreading.)

## 2023-03-13 NOTE — ED NOTES
eTRANSFER SBAR NOTE    IP UNIT CALLED NOTE IS READY: Yes Spoke to [facility]    IF there are questions Call transferring nurse (your name) fifi at phone # [phone]    SITUATION/BACKGROUND:    Patient is being transferred to [facility] Medical Oncology, Room# 118    Patient's Chief Complaint on arrival to ED was [facility] and is admitted for Closed hip fx . CODE STATUS: Full Code    VITAL SIGNS (MUST BE WITHIN 1 HOUR OF TRANSFER TO IP UNIT:    TEMP: 97.8  PULSE: 104  RESP: 16  BP: 155/59  PAIN SCORE: 4  MEWS: 0     ISOLATION/PRECAUTIONS: No       Called outstanding consults: No  none     Are there sign and held orders that need to be released? No   Are all STAT orders completed: No     STAT labs collected: Yes      Are there any titrating drips? No       The following personal items will be sent with the patient during transfer to the floor:   All valuables:   ITEM:    ITEM:    ITEM:           ASSESSMENT:    CIWA Assessment: No  Last Score:     NEURO:   NIH SCORE:   Total: 0 (03/13/23 1023)    KHANG SCREENING:   Swallow Screening  Is the Patient Unable to Remain Alert for Testing?: No (03/13/23 1100)  Is the Patient on a Modified Diet (Thickened Liquids) Due to Pre-existing Dysphagia?: No (03/13/23 1100)  Is There Presence of Existing Enteral Tube Feeding via the Stomach or Nose?: No (03/13/23 1100)  Is There Presence of Head-of-Bed Restrictions (Less than 30 Degrees)?: No (03/13/23 1100)  Is There Presence of Tracheotomy Tube?: No (03/13/23 1100)  Is the Patient Ordered Nothing-by-Mouth Status?: No (03/13/23 1100)  3 oz Water Swallow Screen: Pass (03/13/23 1100)      NEURO ASSESSMENT:   Neuro  Neurologic State: Alert (03/13/23 1023)  Orientation Level: Oriented X4 (03/13/23 1023)  Cognition: Appropriate decision making, Appropriate for age attention/concentration (03/13/23 1023)  Speech: Clear (03/13/23 1023)  Assessment L Pupil: Brisk (03/13/23 1023)  Assessment R Pupil: Brisk (03/13/23 1023)  LUE Motor Response: Rigid (at baseline ) (03/13/23 1023)  LLE Motor Response: Purposeful (03/13/23 1023)  RUE Motor Response: Purposeful (03/13/23 1023)  RLE Motor Response: Purposeful (03/13/23 1023)    Is patient impulsive? No   Is patient oriented? Yes   Do they follow commands? Yes  Is the patient ambulatory? No  Device need cane at baseline, but bed rest r/t hip fx     FALL RISK? Yes   Is the [place] 1 Assessment completed? Yes  INTERVENTIONS: fall risk band, bedrest     INTEGUMENTARY:   IS THE PATIENT UNDRESSED? Yes      RESPIRATORY:   Is patient on Oxygen? No    OXYGEN: Oxygen Therapy  O2 Device: None (Room air) (03/13/23 1022)    CARDIAC:   Is cardiac monitoring ordered? no  Patient to transfer with tele box on? No       LINE ACCESS:   Peripheral IV 03/13/23 Right Antecubital (Active)   Site Assessment Clean, dry, & intact 03/13/23 1007        /GI:   CONTINENT BOWEL/BLADDER? Yes  URINARY OUTPUT: [facility]   Written Order for [facility] Cath? Yes  CHRONIC OR ACUTE? acute                Additional details as Needed:    Baseline ambulates with cane. Plan for surgery today vs tomorrow. Has been seen by ortho and hospitalist. Pain controlled with PRN morphine.  Urine sent from [facility] upon placement

## 2023-03-13 NOTE — CONSULTS
Jacqueline Dia   69 y.o. female  MRN: [id number]   DOA: 3/13/2023       Orthopaedic Surgery Consult Note 3/13/2023 6:52 PM       CC: Right hip pain    HPI: Jacqueline Dia is a 69 y.o. female with PMH of of Diabetes who presented following a ground level fall last night. She landed on the left hip and noted increased pain and inability to bear weight. She did not hit her head or face. On the evaluation in the ED, xrays were obtained, which revealed a right hip fracture. Pain is severe. Also with cramping type pain. Worsens with movement. Improves with rest.  Denies numbness/tingling. Denies any acute pain elsewhere. She does have left chronic shoulder pain and has undergone multiple operations on that shoulder previously. She denies any changes to her left shoulder/humerus since the fall. She typically uses a cane to help ambulate at baseline. She lives alone with 2 cats. PMH:  Past Medical History:   Diagnosis Date    Diabetes ([facility] [address]        PSH:  No past surgical history on file. Meds:  No current facility-administered medications on file prior to encounter. Current Outpatient Medications on File Prior to Encounter   Medication Sig Dispense Refill    alendronate (FOSAMAX) 70 mg tablet Take 1 Tablet by mouth every seven (7) days. aspirin delayed-release 81 mg tablet Take 81 mg by mouth daily. DULoxetine (CYMBALTA) 30 mg capsule Take 1 Capsule by mouth daily. ramipriL (ALTACE) 5 mg capsule Take 1 Capsule by mouth. rosuvastatin (CRESTOR) 5 mg tablet Take 1 Tablet by mouth daily. acetaminophen (TYLENOL) 325 mg tablet Take 2 Tabs by mouth every six (6) hours as needed for Pain. 30 Tab 0         Allergies:   Allergies   Allergen Reactions    Pcn [Penicillins] Hives       Social History:  Social History     Socioeconomic History    Marital status: SINGLE   Tobacco Use    Smoking status: Former     Packs/day: 0.50     Years: 50.00     Pack years: 25.00     Types: Cigarettes Smokeless tobacco: Never    Tobacco comments:     quit about    month ago     Vaping Use    Vaping Use: Never used   Substance and Sexual Activity    Alcohol use: Yes    Drug use: Yes     Types: Prescription, OTC           Family History:  No family history on file. ROS:  Denies CP/dyspnea, recent fevers/chills. Otherwise negative except for that presented in the above HPI. Physical Exam:     Vitals:   Visit Vitals  BP (!) 159/70   Pulse (!) 110   Temp 99 °F (37.2 °C)   Resp 16   Ht 5' 7\" (1.702 m)   Wt 63.5 kg (140 lb)   SpO2 96%   BMI 21.93 kg/m²       General:  Alert, no acute distress, supine in bed. Resp:  Unlabored respirations  CV:  Pulses with Regular rate  :  Cabral in place      RUE:  No areas of tenderness to palpation. Skin intact. Good ROM of digits, wrist, elbow, shoulder. Neurovascularly intact. Fingers WWP    LUE:   No areas of tenderness to palpation. Small area of ecchymosis over lateral shoulder. Nontender. Good ROM of digits, wrist, elbow. Limited shoulder ROM, but at her baseline. Otherwise neurovascularly intact. Fingers WWP    RLE:    Skin intact and without tenting or fracture blisters. No swelling, ecchymosis or lacerations. No TTP/crepitus at distal thigh/knee/leg/ankle/foot. Able to perform ADF/APF, toe flex/ext. SILT 1st DWS/medial/lateral/plantar/dorsal foot. toes WWP, < 3 s CR. Hip Pain with log roll. LLE:  No gross deformities or TTP throughout extremity. Skin intact and without tenting or fracture blisters. No swelling, ecchymosis or lacerations. No TTP/crepitus at hip/thigh/knee/leg/ankle/foot. Motor intact grossly. Able to perform ADF/APF, toe flex/ext. Able to perform straight leg raise against gravity. SILT 1st DWS/medial/lateral/plantar/dorsal foot. toes WWP, < 3 s CR.     Labs:    Recent Results (from the past 24 hour(s))   CBC WITH AUTOMATED DIFF    Collection Time: 03/13/23 10:10 AM   Result Value Ref Range    WBC 13.2 (H) 3.6 - 11.0 K/uL RBC 3.43 (L) 3.80 - 5.20 M/uL    HGB 12.4 11.5 - 16.0 g/dL    HCT 36.7 35.0 - 47.0 %    .0 (H) 80.0 - 99.0 FL    MCH 36.2 (H) 26.0 - 34.0 PG    MCHC 33.8 30.0 - 36.5 g/dL    RDW 12.1 11.5 - 14.5 %    PLATELET 284 396 - 893 K/uL    MPV 8.8 (L) 8.9 - 12.9 FL    NRBC 0.0 0  WBC    ABSOLUTE NRBC 0.00 0.00 - 0.01 K/uL    NEUTROPHILS 90 (H) 32 - 75 %    LYMPHOCYTES 4 (L) 12 - 49 %    MONOCYTES 5 5 - 13 %    EOSINOPHILS 0 0 - 7 %    BASOPHILS 0 0 - 1 %    IMMATURE GRANULOCYTES 1 (H) 0.0 - 0.5 %    ABS. NEUTROPHILS 11.9 (H) 1.8 - 8.0 K/UL    ABS. LYMPHOCYTES 0.5 (L) 0.8 - 3.5 K/UL    ABS. MONOCYTES 0.7 0.0 - 1.0 K/UL    ABS. EOSINOPHILS 0.0 0.0 - 0.4 K/UL    ABS. BASOPHILS 0.0 0.0 - 0.1 K/UL    ABS. IMM. GRANS. 0.1 (H) 0.00 - 0.04 K/UL    DF SMEAR SCANNED      RBC COMMENTS MACROCYTOSIS  1+       METABOLIC PANEL, COMPREHENSIVE    Collection Time: 03/13/23 10:10 AM   Result Value Ref Range    Sodium 142 136 - 145 mmol/L    Potassium 3.9 3.5 - 5.1 mmol/L    Chloride 108 97 - 108 mmol/L    CO2 25 21 - 32 mmol/L    Anion gap 9 5 - 15 mmol/L    Glucose 158 (H) 65 - 100 mg/dL    BUN 19 6 - 20 MG/DL    Creatinine 0.84 0.55 - 1.02 MG/DL    BUN/Creatinine ratio 23 (H) 12 - 20      eGFR >60 >60 ml/min/1.73m2    Calcium 8.8 8.5 - 10.1 MG/DL    Bilirubin, total 0.6 0.2 - 1.0 MG/DL    ALT (SGPT) 42 12 - 78 U/L    AST (SGOT) 40 (H) 15 - 37 U/L    Alk. phosphatase 76 45 - 117 U/L    Protein, total 6.9 6.4 - 8.2 g/dL    Albumin 3.6 3.5 - 5.0 g/dL    Globulin 3.3 2.0 - 4.0 g/dL    A-G Ratio 1.1 1.1 - 2.2     CK    Collection Time: 03/13/23 10:10 AM   Result Value Ref Range     (H) 26 - 192 U/L   SAMPLES BEING HELD    Collection Time: 03/13/23 10:11 AM   Result Value Ref Range    SAMPLES BEING HELD SST,RED,BLUE,DRKGRN     COMMENT        Add-on orders for these samples will be processed based on acceptable specimen integrity and analyte stability, which may vary by analyte.    PROTHROMBIN TIME + INR    Collection Time: 03/13/23 10:11 AM   Result Value Ref Range    INR 0.9 0.9 - 1.1      Prothrombin time 9.9 9.0 - 11.1 sec   URINALYSIS W/ REFLEX CULTURE    Collection Time: 03/13/23 11:29 AM    Specimen: Urine   Result Value Ref Range    Color YELLOW/STRAW      Appearance CLEAR CLEAR      Specific gravity 1.017      pH (UA) 5.0 5.0 - 8.0      Protein 30 (A) NEG mg/dL    Glucose Negative NEG mg/dL    Ketone 15 (A) NEG mg/dL    Bilirubin Negative NEG      Blood LARGE (A) NEG      Urobilinogen 0.2 0.2 - 1.0 EU/dL    Nitrites Negative NEG      Leukocyte Esterase Negative NEG      UA:UC IF INDICATED CULTURE NOT INDICATED BY UA RESULT CNI      WBC 0-4 0 - 4 /hpf    RBC 5-10 0 - 5 /hpf    Epithelial cells FEW FEW /lpf    Bacteria Negative NEG /hpf    Hyaline cast 0-2 0 - 2 /lpf   TYPE & SCREEN    Collection Time: 03/13/23 11:45 AM   Result Value Ref Range    Crossmatch Expiration 03/16/2023,2359     ABO/Rh(D) B POSITIVE     Antibody screen NEG    EKG, 12 LEAD, INITIAL    Collection Time: 03/13/23 11:58 AM   Result Value Ref Range    Ventricular Rate 103 BPM    Atrial Rate 103 BPM    P-R Interval 130 ms    QRS Duration 66 ms    Q-T Interval 346 ms    QTC Calculation (Bezet) 453 ms    Calculated P Axis 74 degrees    Calculated R Axis 43 degrees    Calculated T Axis 75 degrees    Diagnosis       Sinus tachycardia  Nonspecific ST and T wave abnormality  When compared with ECG of 19-MAR-2021 04:35,  ST now depressed in Inferior leads           Imaging:   Right hip xrays personally reviewed by me demonstrate a basicervical/intertrochanteric hip fracture. Assessment and Plan[de-identified]  Monroe Johnson ([id number]) is a 69 y.o. female with a right hip fracture    Admitted to medicine. Optimized for surgery. Will plan for right hip IMN tomorrow.   Discussed surgical risks including DVT, wound healing complications, infection, hardware complications, nonunion, malunion, pepito-implant fracture, continued pain, difficulty weightbearing, weakness, damage to surrounding structures including nerves, vessels, muscles, tendons, anesthetic complications including MI and death. Cabral in place  Bedrest for now. Anticipate WBAT following surgery  NPO at midnight  [facility] for dose of Lovenox today. Hold day of surgery.   Will restart anticoagulation morning of POD#1  Patient may require rehab post-op as she lives alone and no family to Jono Sanders MD

## 2023-03-13 NOTE — PROGRESS NOTES
Problem: Pressure Injury - Risk of  Goal: *Prevention of pressure injury  Description: Document Kwabena Scale and appropriate interventions in the flowsheet.   Outcome: Progressing Towards Goal  Note: Pressure Injury Interventions:       Moisture Interventions: Absorbent underpads    Activity Interventions: Assess need for specialty bed    Mobility Interventions: Float heels, HOB 30 degrees or less    Nutrition Interventions: Document food/fluid/supplement intake

## 2023-03-13 NOTE — H&P
Hospitalist Admission Note    NAME: Oriana Hemphill   :     MRN:  [id number]     Date/Time:  3/13/2023 1:20 PM    Patient PCP: Angela Pulido MD  ______________________________________________________________________  Given the patient's current clinical presentation, I have a high level of concern for decompensation if discharged from the emergency department. Complex decision making was performed, which includes reviewing the patient's available past medical records, laboratory results, and x-ray films. My assessment of this patient's clinical condition and my plan of care is as follows. Assessment / Plan:  Right intertrochanteric fracture  S/p mechanical fall:    CXR normal. EKG no ischemia  Based on RCRI score, she is class I risk, has 3.9% risk of death, MI or cardiac arrest.  Can proceed with surgery without further testing. Pain control  Plan for surgery tomorrow  Can start dvt ppx after surgery  Leukocytosis likey reactive, repeat in AM    Elevated CK:  Mild elevation to 600  Gentle hydration, repeat in AM    HTN:  C/w ACE (ramipril switched to lisinopril)    History of DM: Was on metformin in the past. During her last hospitalization, Hba1c was 4 and medications was stopped. Osteopenia:  Takes alendronate weekly    ETOH use:  Drinks 2 glassess of wine daily    Hyperlipidemia:  Cw statin    Code Status: Full  Surrogate Decision Maker:    DVT Prophylaxis: Start after surgery  GI Prophylaxis: not indicated    Baseline: Uses cane at home      Subjective:   CHIEF COMPLAINT: Right hip pain    HISTORY OF PRESENT ILLNESS:     Oriana Hemphill is a 69 y.o.  f with PMH of HTN, Osteopenia, presented with right hip pain after fall yesterday. She had a fall at her home yesterday as she lost her balance.  She denies any lightheadedness, dizziness, LOC, change in her bowel or bladder habits, lower extremity edema or tenderness,     We were asked to admit for work up and evaluation of the above problems. Past Medical History:   Diagnosis Date    Diabetes ([facility] [address]     HTN  Osteopenia    No past surgical history on file. Social History     Tobacco Use    Smoking status: Former     Packs/day: 0.50     Years: 50.00     Pack years: 25.00     Types: Cigarettes    Smokeless tobacco: Never    Tobacco comments:     quit about    month ago     Substance Use Topics    Alcohol use: Yes        No family history on file. Allergies   Allergen Reactions    Pcn [Penicillins] Hives        Prior to Admission medications    Medication Sig Start Date End Date Taking? Authorizing Provider   alendronate (FOSAMAX) 70 mg tablet Take 1 Tablet by mouth every seven (7) days. 2/14/23   Provider, Historical   aspirin delayed-release 81 mg tablet Take 81 mg by mouth daily. Provider, Historical   DULoxetine (CYMBALTA) 30 mg capsule Take 1 Capsule by mouth daily. 2/12/23   Provider, Historical   ramipriL (ALTACE) 5 mg capsule Take 1 Capsule by mouth. 1/16/23   Provider, Historical   rosuvastatin (CRESTOR) 5 mg tablet Take 1 Tablet by mouth daily. 1/30/23   Provider, Historical   acetaminophen (TYLENOL) 325 mg tablet Take 2 Tabs by mouth every six (6) hours as needed for Pain.  3/25/21   Balbir Cifuentes MD       REVIEW OF SYSTEMS:        Total of 12 systems reviewed as follows:       POSITIVE= underlined text  Negative = text not underlined  General:  fever, chills, sweats, generalized weakness, weight loss/gain,      loss of appetite   Eyes:    blurred vision, eye pain, loss of vision, double vision  ENT:    rhinorrhea, pharyngitis   Respiratory:   cough, sputum production, SOB, COULTER, wheezing, pleuritic pain   Cardiology:   chest pain, palpitations, orthopnea, PND, edema, syncope   Gastrointestinal:  abdominal pain , N/V, diarrhea, dysphagia, constipation, bleeding   Genitourinary:  frequency, urgency, dysuria, hematuria, incontinence   Muskuloskeletal :  arthralgia, myalgia, back pain, right hip pain  Hematology:  easy bruising, nose or gum bleeding, lymphadenopathy   Dermatological: rash, ulceration, pruritis, color change / jaundice  Endocrine:   hot flashes or polydipsia   Neurological:  headache, dizziness, confusion, focal weakness, paresthesia,     Speech difficulties, memory loss, gait difficulty  Psychological: Feelings of anxiety, depression, agitation    Objective:   VITALS:    Visit Vitals  BP (!) 155/59   Pulse (!) 103   Temp 97.8 °F (36.6 °C)   Resp 26   Ht 5' 7\" (1.702 m)   Wt 63.5 kg (140 lb)   SpO2 93%   BMI 21.93 kg/m²       PHYSICAL EXAM:    General:    Alert, cooperative, no distress, appears stated age. HEENT: Atraumatic, anicteric sclerae, pink conjunctivae     No oral ulcers, mucosa moist, throat clear, dentition fair  Neck:  Supple, symmetrical,  thyroid: non tender  Lungs:   Clear to auscultation bilaterally. No Wheezing or Rhonchi. No rales. Chest wall:  No tenderness  No Accessory muscle use. Heart:   Regular  rhythm,  No  murmur   No edema  Abdomen:   Soft, non-tender. Not distended. Bowel sounds normal  Extremities: Right hip tenderness  Skin:     Not pale. Not Jaundiced  No rashes   Psych:  Good insight. Not depressed. Not anxious or agitated. Neurologic: EOMs intact. No facial asymmetry. No aphasia or slurred speech. Symmetrical strength, Sensation grossly intact.  Alert and oriented X 4.     _______________________________________________________________________  Care Plan discussed with:    Comments   Patient y    Family      RN y    Care Manager                    Consultant:      _______________________________________________________________________  Expected  Disposition:   Home with Family    HH/PT/OT/RN y   SNF/LTC    OSCAR    ________________________________________________________________________  TOTAL TIME:  76 Minutes    Critical Care Provided     Minutes non procedure based      Comments    x Reviewed previous records   >50% of visit spent in counseling and coordination of care x Discussion with patient and/or family and questions answered       ________________________________________________________________________  Signed: Ernst Reynaga MD    Procedures: see electronic medical records for all procedures/Xrays and details which were not copied into this note but were reviewed prior to creation of Plan. LAB DATA REVIEWED:    Recent Results (from the past 24 hour(s))   CBC WITH AUTOMATED DIFF    Collection Time: 03/13/23 10:10 AM   Result Value Ref Range    WBC 13.2 (H) 3.6 - 11.0 K/uL    RBC 3.43 (L) 3.80 - 5.20 M/uL    HGB 12.4 11.5 - 16.0 g/dL    HCT 36.7 35.0 - 47.0 %    .0 (H) 80.0 - 99.0 FL    MCH 36.2 (H) 26.0 - 34.0 PG    MCHC 33.8 30.0 - 36.5 g/dL    RDW 12.1 11.5 - 14.5 %    PLATELET 961 645 - 058 K/uL    MPV 8.8 (L) 8.9 - 12.9 FL    NRBC 0.0 0  WBC    ABSOLUTE NRBC 0.00 0.00 - 0.01 K/uL    NEUTROPHILS 90 (H) 32 - 75 %    LYMPHOCYTES 4 (L) 12 - 49 %    MONOCYTES 5 5 - 13 %    EOSINOPHILS 0 0 - 7 %    BASOPHILS 0 0 - 1 %    IMMATURE GRANULOCYTES 1 (H) 0.0 - 0.5 %    ABS. NEUTROPHILS 11.9 (H) 1.8 - 8.0 K/UL    ABS. LYMPHOCYTES 0.5 (L) 0.8 - 3.5 K/UL    ABS. MONOCYTES 0.7 0.0 - 1.0 K/UL    ABS. EOSINOPHILS 0.0 0.0 - 0.4 K/UL    ABS. BASOPHILS 0.0 0.0 - 0.1 K/UL    ABS. IMM. GRANS. 0.1 (H) 0.00 - 0.04 K/UL    DF SMEAR SCANNED      RBC COMMENTS MACROCYTOSIS  1+       METABOLIC PANEL, COMPREHENSIVE    Collection Time: 03/13/23 10:10 AM   Result Value Ref Range    Sodium 142 136 - 145 mmol/L    Potassium 3.9 3.5 - 5.1 mmol/L    Chloride 108 97 - 108 mmol/L    CO2 25 21 - 32 mmol/L    Anion gap 9 5 - 15 mmol/L    Glucose 158 (H) 65 - 100 mg/dL    BUN 19 6 - 20 MG/DL    Creatinine 0.84 0.55 - 1.02 MG/DL    BUN/Creatinine ratio 23 (H) 12 - 20      eGFR >60 >60 ml/min/1.73m2    Calcium 8.8 8.5 - 10.1 MG/DL    Bilirubin, total 0.6 0.2 - 1.0 MG/DL    ALT (SGPT) 42 12 - 78 U/L    AST (SGOT) 40 (H) 15 - 37 U/L    Alk.  phosphatase 76 45 - 117 U/L Protein, total 6.9 6.4 - 8.2 g/dL    Albumin 3.6 3.5 - 5.0 g/dL    Globulin 3.3 2.0 - 4.0 g/dL    A-G Ratio 1.1 1.1 - 2.2     CK    Collection Time: 03/13/23 10:10 AM   Result Value Ref Range     (H) 26 - 192 U/L   SAMPLES BEING HELD    Collection Time: 03/13/23 10:11 AM   Result Value Ref Range    SAMPLES BEING HELD SST,RED,BLUE,DRKGRN     COMMENT        Add-on orders for these samples will be processed based on acceptable specimen integrity and analyte stability, which may vary by analyte.    PROTHROMBIN TIME + INR    Collection Time: 03/13/23 10:11 AM   Result Value Ref Range    INR 0.9 0.9 - 1.1      Prothrombin time 9.9 9.0 - 11.1 sec   URINALYSIS W/ REFLEX CULTURE    Collection Time: 03/13/23 11:29 AM    Specimen: Urine   Result Value Ref Range    Color YELLOW/STRAW      Appearance CLEAR CLEAR      Specific gravity 1.017      pH (UA) 5.0 5.0 - 8.0      Protein 30 (A) NEG mg/dL    Glucose Negative NEG mg/dL    Ketone 15 (A) NEG mg/dL    Bilirubin Negative NEG      Blood LARGE (A) NEG      Urobilinogen 0.2 0.2 - 1.0 EU/dL    Nitrites Negative NEG      Leukocyte Esterase Negative NEG      UA:UC IF INDICATED CULTURE NOT INDICATED BY UA RESULT CNI      WBC 0-4 0 - 4 /hpf    RBC 5-10 0 - 5 /hpf    Epithelial cells FEW FEW /lpf    Bacteria Negative NEG /hpf    Hyaline cast 0-2 0 - 2 /lpf   EKG, 12 LEAD, INITIAL    Collection Time: 03/13/23 11:58 AM   Result Value Ref Range    Ventricular Rate 103 BPM    Atrial Rate 103 BPM    P-R Interval 130 ms    QRS Duration 66 ms    Q-T Interval 346 ms    QTC Calculation (Bezet) 453 ms    Calculated P Axis 74 degrees    Calculated R Axis 43 degrees    Calculated T Axis 75 degrees    Diagnosis       Sinus tachycardia  Nonspecific ST and T wave abnormality  When compared with ECG of 19-MAR-2021 04:35,  ST now depressed in Inferior leads

## 2023-03-13 NOTE — CONSULTS
ORTHOPAEDIC CONSULT NOTE    Subjective:     Date of Consultation:  March 13, 2023      Oriana Hemphill is a 69 y.o. female who is being seen for right hip fracture after a ground-level fall last night. Patient states at baseline she uses a cane to walk around, but at this moment she is not using her cane and accidentally suffered a ground-level fall. Patient had immediate pain in her right hip and was unable to ambulate. Patient was brought to the emergency department today where she was found to have a right intertrochanteric fracture of her hip. Patient's only complaint at this time is right hip pain. She denies any pain in any of her other extremities. She denies any acute pain in her left upper extremity whatsoever. She denies any loss of function. She denies any sensation changes. She denies any other complaints at this time. Patient Active Problem List    Diagnosis Date Noted    Closed right hip fracture ([facility] 03/13/2023    Compression fracture of body of thoracic vertebra ([facility] 03/17/2021     No family history on file. Social History     Tobacco Use    Smoking status: Former     Packs/day: 0.50     Years: 50.00     Pack years: 25.00     Types: Cigarettes    Smokeless tobacco: Never    Tobacco comments:     quit about    month ago     Substance Use Topics    Alcohol use: Yes     Past Medical History:   Diagnosis Date    Diabetes ([facility]       No past surgical history on file. Prior to Admission medications    Medication Sig Start Date End Date Taking? Authorizing Provider   acetaminophen (TYLENOL) 325 mg tablet Take 2 Tabs by mouth every six (6) hours as needed for Pain.  3/25/21   Lb Hector MD     Current Facility-Administered Medications   Medication Dose Route Frequency    morphine injection 2 mg  2 mg IntraVENous Q4H PRN    acetaminophen (TYLENOL) tablet 650 mg  650 mg Oral Q6H    oxyCODONE IR (ROXICODONE) tablet 2.5 mg  2.5 mg Oral Q4H PRN    oxyCODONE IR (ROXICODONE) tablet 5 mg  5 mg Oral Q4H PRN    naloxone (NARCAN) injection 0.4 mg  0.4 mg IntraVENous PRN    senna-docusate (PERICOLACE) 8.6-50 mg per tablet 2 Tablet  2 Tablet Oral BID    sodium chloride (NS) flush 5-40 mL  5-40 mL IntraVENous PRN     Current Outpatient Medications   Medication Sig    acetaminophen (TYLENOL) 325 mg tablet Take 2 Tabs by mouth every six (6) hours as needed for Pain. Allergies   Allergen Reactions    Pcn [Penicillins] Hives        Review of Systems:  A comprehensive review of systems was negative except for that written in the HPI. Mental Status: Alert and oriented. Objective:     Patient Vitals for the past 8 hrs:   BP Temp Pulse Resp SpO2 Height Weight   23 1042 110/80 -- 96 20 98 % -- --   23 1022 -- -- -- -- 100 % -- --   23 1009 (!) 171/70 97.8 °F (36.6 °C) (!) 102 19 100 % 5' 7\" (1.702 m) 63.5 kg (140 lb)     Temp (24hrs), Av.8 °F (36.6 °C), Min:97.8 °F (36.6 °C), Max:97.8 °F (36.6 °C)      Gen: Well-developed,  in no acute distress   HEENT: Pink conjunctivae, hearing intact to voice, moist mucous membranes   Neck: Supple  Resp: No respiratory distress   Card: palpable distal pulse-equal bilaterally, brisk cap refill all distal digits   Abd: non-distended  Musc: Inspection of the bilateral upper and bilateral lower extremity reveals a shortened right leg. Palpation elicits expected pain over the right hip, but all compartments are soft to the touch. Patient does not experience any pain to bilateral ankles, knees, wrists, elbows, shoulder, or the left hip or anywhere throughout the left upper extremity. Active range of motion intact distal lower extremity bilaterally. 5/5 strength distal lower extremity bilaterally. Full range of motion of the right upper extremity and 5/5 strength. Limited active and passive range of motion to approximately 45 degrees of flexion and abduction of the left shoulder with no associated pain.   4/5 strength throughout tolerated ranges of motion in the left upper extremity. Patient experiences pain with any gentle range of motion of the right hip. Skin: No skin breakdown noted. Skin warm, pink, dry. Multiple well-healed scars of the left upper extremity. Mild confusion contusion over the left lateral deltoid. Some right-sided facial bruising. Neuro: Cranial nerves are grossly intact, no focal motor weakness, follows commands appropriately. Sensation light touch intact bilateral upper and bilateral lower extremities. Psych: Good insight, oriented to person, place and time, alert    Imaging Review: Clinical history: fall  INDICATION:   fall  COMPARISON: 3/21/2021     FINDINGS:  AP portable upright view of the chest demonstrates a stable  cardiopericardial  silhouette. There is no pleural effusion. .There is no focal consolidation. .Left  shoulder arthroplasty. Stabilization hardware in the lower thoracic and upper  lumbar spines. Minimal aortic atherosclerotic change. . Patient is on a cardiac  monitor. IMPRESSION  No acute intrathoracic process is identified. EXAM: XR HIP RT W OR WO PELV 2-3 VWS  History: Fall, hip pain  INDICATION: fall, hip pain. COMPARISON: None. FINDINGS: AP view of the pelvis and a frogleg lateral view of the right hip  demonstrate displaced basicervical intratrochanteric fracture. Debbie Mcdonald IMPRESSION  Mildly displaced intertrochanteric fracture. .    Labs:   Recent Results (from the past 24 hour(s))   CBC WITH AUTOMATED DIFF    Collection Time: 03/13/23 10:10 AM   Result Value Ref Range    WBC 13.2 (H) 3.6 - 11.0 K/uL    RBC 3.43 (L) 3.80 - 5.20 M/uL    HGB 12.4 11.5 - 16.0 g/dL    HCT 36.7 35.0 - 47.0 %    .0 (H) 80.0 - 99.0 FL    MCH 36.2 (H) 26.0 - 34.0 PG    MCHC 33.8 30.0 - 36.5 g/dL    RDW 12.1 11.5 - 14.5 %    PLATELET 737 998 - 464 K/uL    MPV 8.8 (L) 8.9 - 12.9 FL    NRBC 0.0 0  WBC    ABSOLUTE NRBC 0.00 0.00 - 0.01 K/uL    NEUTROPHILS 90 (H) 32 - 75 %    LYMPHOCYTES 4 (L) 12 - 49 % MONOCYTES 5 5 - 13 %    EOSINOPHILS 0 0 - 7 %    BASOPHILS 0 0 - 1 %    IMMATURE GRANULOCYTES 1 (H) 0.0 - 0.5 %    ABS. NEUTROPHILS 11.9 (H) 1.8 - 8.0 K/UL    ABS. LYMPHOCYTES 0.5 (L) 0.8 - 3.5 K/UL    ABS. MONOCYTES 0.7 0.0 - 1.0 K/UL    ABS. EOSINOPHILS 0.0 0.0 - 0.4 K/UL    ABS. BASOPHILS 0.0 0.0 - 0.1 K/UL    ABS. IMM. GRANS. 0.1 (H) 0.00 - 0.04 K/UL    DF SMEAR SCANNED      RBC COMMENTS MACROCYTOSIS  1+       METABOLIC PANEL, COMPREHENSIVE    Collection Time: 03/13/23 10:10 AM   Result Value Ref Range    Sodium 142 136 - 145 mmol/L    Potassium 3.9 3.5 - 5.1 mmol/L    Chloride 108 97 - 108 mmol/L    CO2 25 21 - 32 mmol/L    Anion gap 9 5 - 15 mmol/L    Glucose 158 (H) 65 - 100 mg/dL    BUN 19 6 - 20 MG/DL    Creatinine 0.84 0.55 - 1.02 MG/DL    BUN/Creatinine ratio 23 (H) 12 - 20      eGFR >60 >60 ml/min/1.73m2    Calcium 8.8 8.5 - 10.1 MG/DL    Bilirubin, total 0.6 0.2 - 1.0 MG/DL    ALT (SGPT) 42 12 - 78 U/L    AST (SGOT) 40 (H) 15 - 37 U/L    Alk. phosphatase 76 45 - 117 U/L    Protein, total 6.9 6.4 - 8.2 g/dL    Albumin 3.6 3.5 - 5.0 g/dL    Globulin 3.3 2.0 - 4.0 g/dL    A-G Ratio 1.1 1.1 - 2.2     CK    Collection Time: 03/13/23 10:10 AM   Result Value Ref Range     (H) 26 - 192 U/L   SAMPLES BEING HELD    Collection Time: 03/13/23 10:11 AM   Result Value Ref Range    SAMPLES BEING HELD SST,RED,BLUE,DRKGRN     COMMENT        Add-on orders for these samples will be processed based on acceptable specimen integrity and analyte stability, which may vary by analyte.    URINALYSIS W/ REFLEX CULTURE    Collection Time: 03/13/23 11:29 AM    Specimen: Urine   Result Value Ref Range    Color YELLOW/STRAW      Appearance CLEAR CLEAR      Specific gravity 1.017      pH (UA) 5.0 5.0 - 8.0      Protein 30 (A) NEG mg/dL    Glucose Negative NEG mg/dL    Ketone 15 (A) NEG mg/dL    Bilirubin Negative NEG      Blood LARGE (A) NEG      Urobilinogen 0.2 0.2 - 1.0 EU/dL    Nitrites Negative NEG      Leukocyte Esterase Negative NEG      UA:UC IF INDICATED CULTURE NOT INDICATED BY UA RESULT CNI      WBC 0-4 0 - 4 /hpf    RBC 5-10 0 - 5 /hpf    Epithelial cells FEW FEW /lpf    Bacteria Negative NEG /hpf    Hyaline cast 0-2 0 - 2 /lpf   EKG, 12 LEAD, INITIAL    Collection Time: 03/13/23 11:58 AM   Result Value Ref Range    Ventricular Rate 103 BPM    Atrial Rate 103 BPM    P-R Interval 130 ms    QRS Duration 66 ms    Q-T Interval 346 ms    QTC Calculation (Bezet) 453 ms    Calculated P Axis 74 degrees    Calculated R Axis 43 degrees    Calculated T Axis 75 degrees    Diagnosis       Sinus tachycardia  Nonspecific ST and T wave abnormality  When compared with ECG of 19-MAR-2021 04:35,  ST now depressed in Inferior leads           Impression:     Patient Active Problem List    Diagnosis Date Noted    Closed right hip fracture ([address] 03/13/2023    Compression fracture of body of thoracic vertebra ([address] 03/17/2021     Active Problems:    Closed right hip fracture ([address] (3/13/2023)        Plan:   Right minimally displaced intertrochanteric hip fracture  History of left reverse total shoulder arthroplasty and previous ORIF/humeral nailing of the left humerus    -  Medicine for Pre-Operative [place]/ Post-Operative management.     -Patient found to have a right intertrochanteric hip fracture which will require surgical intervention.  -Imaging reviewed and x-rays of the chest did reveal what appears to be a malunion of a previous left humeral shaft fracture and hardware from multiple left upper extremity surgeries. On exam today, patient has similar ranges of motion in comparison to previous notes documented by Dr. Nigel Cline who performed some of her left upper extremity surgeries. Additionally, she does not have any pain on physical exam whatsoever in the left upper extremity.  -Bedrest for now  -Right femur films ordered for preop planning. Will review once these are obtained.  -Okay for diet today.   N.p.o. after midnight  -Plan for right hip trochanteric nail insertion tomorrow by Dr. Angel Pacheco (3/13/2023; [place])  -Okay for short acting anticoagulation today with heparin if appropriate by medical service. Otherwise, hold DVT for surgery tomorrow.  -Patient will likely have some limitation ambulating postoperatively due to limited function of the left upper extremity at baseline. She will likely need a platform walker to ambulate with physical therapy. Dr. Haskins Killer aware and agrees with plan as above.         Donna BRENDA Clifford  [employer]

## 2023-03-14 ENCOUNTER — APPOINTMENT (OUTPATIENT)
Dept: GENERAL RADIOLOGY | Age: 78
DRG: 482 | End: 2023-03-14
Attending: STUDENT IN AN ORGANIZED HEALTH CARE EDUCATION/TRAINING PROGRAM
Payer: MEDICARE

## 2023-03-14 ENCOUNTER — ANESTHESIA (OUTPATIENT)
Dept: SURGERY | Age: 78
DRG: 482 | End: 2023-03-14
Payer: MEDICARE

## 2023-03-14 LAB
ANION GAP SERPL CALC-SCNC: 4 MMOL/L (ref 5–15)
BUN SERPL-MCNC: 17 MG/DL (ref 6–20)
BUN/CREAT SERPL: 27 (ref 12–20)
CALCIUM SERPL-MCNC: 8 MG/DL (ref 8.5–10.1)
CHLORIDE SERPL-SCNC: 108 MMOL/L (ref 97–108)
CK SERPL-CCNC: 464 U/L (ref 26–192)
CO2 SERPL-SCNC: 24 MMOL/L (ref 21–32)
CREAT SERPL-MCNC: 0.63 MG/DL (ref 0.55–1.02)
ERYTHROCYTE [DISTWIDTH] IN BLOOD BY AUTOMATED COUNT: 12.4 % (ref 11.5–14.5)
GLUCOSE BLD STRIP.AUTO-MCNC: 135 MG/DL (ref 65–117)
GLUCOSE SERPL-MCNC: 120 MG/DL (ref 65–100)
HCT VFR BLD AUTO: 31.7 % (ref 35–47)
HGB BLD-MCNC: 10.6 G/DL (ref 11.5–16)
MCH RBC QN AUTO: 36.9 PG (ref 26–34)
MCHC RBC AUTO-ENTMCNC: 33.4 G/DL (ref 30–36.5)
MCV RBC AUTO: 110.5 FL (ref 80–99)
NRBC # BLD: 0 K/UL (ref 0–0.01)
NRBC BLD-RTO: 0 PER 100 WBC
PLATELET # BLD AUTO: 175 K/UL (ref 150–400)
PMV BLD AUTO: 8.6 FL (ref 8.9–12.9)
POTASSIUM SERPL-SCNC: 3.4 MMOL/L (ref 3.5–5.1)
RBC # BLD AUTO: 2.87 M/UL (ref 3.8–5.2)
SERVICE CMNT-IMP: ABNORMAL
SODIUM SERPL-SCNC: 136 MMOL/L (ref 136–145)
WBC # BLD AUTO: 7.4 K/UL (ref 3.6–11)

## 2023-03-14 PROCEDURE — 74011000250 HC RX REV CODE- 250: Performed by: STUDENT IN AN ORGANIZED HEALTH CARE EDUCATION/TRAINING PROGRAM

## 2023-03-14 PROCEDURE — 65270000046 HC RM TELEMETRY

## 2023-03-14 PROCEDURE — 74011250636 HC RX REV CODE- 250/636: Performed by: STUDENT IN AN ORGANIZED HEALTH CARE EDUCATION/TRAINING PROGRAM

## 2023-03-14 PROCEDURE — 77030008462 HC STPLR SKN PROX J&J -A: Performed by: STUDENT IN AN ORGANIZED HEALTH CARE EDUCATION/TRAINING PROGRAM

## 2023-03-14 PROCEDURE — 74011250636 HC RX REV CODE- 250/636: Performed by: NURSE ANESTHETIST, CERTIFIED REGISTERED

## 2023-03-14 PROCEDURE — 74011250637 HC RX REV CODE- 250/637: Performed by: STUDENT IN AN ORGANIZED HEALTH CARE EDUCATION/TRAINING PROGRAM

## 2023-03-14 PROCEDURE — 36415 COLL VENOUS BLD VENIPUNCTURE: CPT

## 2023-03-14 PROCEDURE — C1713 ANCHOR/SCREW BN/BN,TIS/BN: HCPCS | Performed by: STUDENT IN AN ORGANIZED HEALTH CARE EDUCATION/TRAINING PROGRAM

## 2023-03-14 PROCEDURE — 85027 COMPLETE CBC AUTOMATED: CPT

## 2023-03-14 PROCEDURE — 73501 X-RAY EXAM HIP UNI 1 VIEW: CPT

## 2023-03-14 PROCEDURE — 80048 BASIC METABOLIC PNL TOTAL CA: CPT

## 2023-03-14 PROCEDURE — 77030016451 HC BIT DRL AO3 STRY -C: Performed by: STUDENT IN AN ORGANIZED HEALTH CARE EDUCATION/TRAINING PROGRAM

## 2023-03-14 PROCEDURE — 74011250636 HC RX REV CODE- 250/636

## 2023-03-14 PROCEDURE — 2709999900 HC NON-CHARGEABLE SUPPLY: Performed by: STUDENT IN AN ORGANIZED HEALTH CARE EDUCATION/TRAINING PROGRAM

## 2023-03-14 PROCEDURE — 76010000153 HC OR TIME 1.5 TO 2 HR: Performed by: STUDENT IN AN ORGANIZED HEALTH CARE EDUCATION/TRAINING PROGRAM

## 2023-03-14 PROCEDURE — 0QS636Z REPOSITION RIGHT UPPER FEMUR WITH INTRAMEDULLARY INTERNAL FIXATION DEVICE, PERCUTANEOUS APPROACH: ICD-10-PCS | Performed by: STUDENT IN AN ORGANIZED HEALTH CARE EDUCATION/TRAINING PROGRAM

## 2023-03-14 PROCEDURE — 76060000034 HC ANESTHESIA 1.5 TO 2 HR: Performed by: STUDENT IN AN ORGANIZED HEALTH CARE EDUCATION/TRAINING PROGRAM

## 2023-03-14 PROCEDURE — 74011000272 HC RX REV CODE- 272: Performed by: STUDENT IN AN ORGANIZED HEALTH CARE EDUCATION/TRAINING PROGRAM

## 2023-03-14 PROCEDURE — 77030020788: Performed by: STUDENT IN AN ORGANIZED HEALTH CARE EDUCATION/TRAINING PROGRAM

## 2023-03-14 PROCEDURE — 74011000250 HC RX REV CODE- 250: Performed by: NURSE ANESTHETIST, CERTIFIED REGISTERED

## 2023-03-14 PROCEDURE — 77030026438 HC STYL ET INTUB CARD -A: Performed by: ANESTHESIOLOGY

## 2023-03-14 PROCEDURE — 74011250637 HC RX REV CODE- 250/637: Performed by: PHYSICIAN ASSISTANT

## 2023-03-14 PROCEDURE — 77030008684 HC TU ET CUF COVD -B: Performed by: ANESTHESIOLOGY

## 2023-03-14 PROCEDURE — 94760 N-INVAS EAR/PLS OXIMETRY 1: CPT

## 2023-03-14 PROCEDURE — 82962 GLUCOSE BLOOD TEST: CPT

## 2023-03-14 PROCEDURE — 76210000006 HC OR PH I REC 0.5 TO 1 HR: Performed by: STUDENT IN AN ORGANIZED HEALTH CARE EDUCATION/TRAINING PROGRAM

## 2023-03-14 PROCEDURE — 74011000250 HC RX REV CODE- 250

## 2023-03-14 PROCEDURE — 77030031139 HC SUT VCRL2 J&J -A: Performed by: STUDENT IN AN ORGANIZED HEALTH CARE EDUCATION/TRAINING PROGRAM

## 2023-03-14 PROCEDURE — 82550 ASSAY OF CK (CPK): CPT

## 2023-03-14 PROCEDURE — 74011250637 HC RX REV CODE- 250/637: Performed by: ORTHOPAEDIC SURGERY

## 2023-03-14 PROCEDURE — 76000 FLUOROSCOPY <1 HR PHYS/QHP: CPT

## 2023-03-14 DEVICE — LOCKING SCREW, FULLY THREADED: Type: IMPLANTABLE DEVICE | Site: HIP | Status: FUNCTIONAL

## 2023-03-14 DEVICE — LAG SCREW, TI
Type: IMPLANTABLE DEVICE | Site: HIP | Status: FUNCTIONAL
Brand: GAMMA

## 2023-03-14 DEVICE — TROCHANTERIC NAIL KIT, TI
Type: IMPLANTABLE DEVICE | Site: HIP | Status: FUNCTIONAL
Brand: GAMMA

## 2023-03-14 RX ORDER — LIDOCAINE HYDROCHLORIDE 20 MG/ML
INJECTION, SOLUTION EPIDURAL; INFILTRATION; INTRACAUDAL; PERINEURAL AS NEEDED
Status: DISCONTINUED | OUTPATIENT
Start: 2023-03-14 | End: 2023-03-14 | Stop reason: HOSPADM

## 2023-03-14 RX ORDER — PHENYLEPHRINE HCL IN 0.9% NACL 0.4MG/10ML
SYRINGE (ML) INTRAVENOUS AS NEEDED
Status: DISCONTINUED | OUTPATIENT
Start: 2023-03-14 | End: 2023-03-14 | Stop reason: HOSPADM

## 2023-03-14 RX ORDER — FENTANYL CITRATE 50 UG/ML
25 INJECTION, SOLUTION INTRAMUSCULAR; INTRAVENOUS
Status: DISCONTINUED | OUTPATIENT
Start: 2023-03-14 | End: 2023-03-14

## 2023-03-14 RX ORDER — LIDOCAINE HYDROCHLORIDE 10 MG/ML
0.1 INJECTION, SOLUTION EPIDURAL; INFILTRATION; INTRACAUDAL; PERINEURAL AS NEEDED
Status: DISCONTINUED | OUTPATIENT
Start: 2023-03-14 | End: 2023-03-14 | Stop reason: HOSPADM

## 2023-03-14 RX ORDER — FERROUS SULFATE, DRIED 160(50) MG
1 TABLET, EXTENDED RELEASE ORAL
Status: DISCONTINUED | OUTPATIENT
Start: 2023-03-15 | End: 2023-03-16 | Stop reason: HOSPADM

## 2023-03-14 RX ORDER — ACETAMINOPHEN 500 MG
1000 TABLET ORAL ONCE
Status: COMPLETED | OUTPATIENT
Start: 2023-03-14 | End: 2023-03-14

## 2023-03-14 RX ORDER — KETAMINE HYDROCHLORIDE 10 MG/ML
INJECTION INTRAMUSCULAR; INTRAVENOUS AS NEEDED
Status: DISCONTINUED | OUTPATIENT
Start: 2023-03-14 | End: 2023-03-14 | Stop reason: HOSPADM

## 2023-03-14 RX ORDER — OXYCODONE HYDROCHLORIDE 5 MG/1
5 TABLET ORAL AS NEEDED
Status: DISCONTINUED | OUTPATIENT
Start: 2023-03-14 | End: 2023-03-14

## 2023-03-14 RX ORDER — CEFAZOLIN SODIUM 1 G/3ML
INJECTION, POWDER, FOR SOLUTION INTRAMUSCULAR; INTRAVENOUS AS NEEDED
Status: DISCONTINUED | OUTPATIENT
Start: 2023-03-14 | End: 2023-03-14 | Stop reason: HOSPADM

## 2023-03-14 RX ORDER — BALSAM PERU/CASTOR OIL
OINTMENT (GRAM) TOPICAL EVERY 12 HOURS
Status: DISCONTINUED | OUTPATIENT
Start: 2023-03-14 | End: 2023-03-16 | Stop reason: HOSPADM

## 2023-03-14 RX ORDER — DEXAMETHASONE SODIUM PHOSPHATE 4 MG/ML
INJECTION, SOLUTION INTRA-ARTICULAR; INTRALESIONAL; INTRAMUSCULAR; INTRAVENOUS; SOFT TISSUE AS NEEDED
Status: DISCONTINUED | OUTPATIENT
Start: 2023-03-14 | End: 2023-03-14 | Stop reason: HOSPADM

## 2023-03-14 RX ORDER — OXYCODONE HYDROCHLORIDE 5 MG/1
10 TABLET ORAL
Status: DISCONTINUED | OUTPATIENT
Start: 2023-03-14 | End: 2023-03-16 | Stop reason: HOSPADM

## 2023-03-14 RX ORDER — PROPOFOL 10 MG/ML
INJECTION, EMULSION INTRAVENOUS AS NEEDED
Status: DISCONTINUED | OUTPATIENT
Start: 2023-03-14 | End: 2023-03-14 | Stop reason: HOSPADM

## 2023-03-14 RX ORDER — SODIUM CHLORIDE, SODIUM LACTATE, POTASSIUM CHLORIDE, CALCIUM CHLORIDE 600; 310; 30; 20 MG/100ML; MG/100ML; MG/100ML; MG/100ML
75 INJECTION, SOLUTION INTRAVENOUS CONTINUOUS
Status: DISCONTINUED | OUTPATIENT
Start: 2023-03-14 | End: 2023-03-14 | Stop reason: HOSPADM

## 2023-03-14 RX ORDER — ROCURONIUM BROMIDE 10 MG/ML
INJECTION, SOLUTION INTRAVENOUS AS NEEDED
Status: DISCONTINUED | OUTPATIENT
Start: 2023-03-14 | End: 2023-03-14 | Stop reason: HOSPADM

## 2023-03-14 RX ORDER — ONDANSETRON 2 MG/ML
INJECTION INTRAMUSCULAR; INTRAVENOUS AS NEEDED
Status: DISCONTINUED | OUTPATIENT
Start: 2023-03-14 | End: 2023-03-14 | Stop reason: HOSPADM

## 2023-03-14 RX ORDER — ENOXAPARIN SODIUM 100 MG/ML
40 INJECTION SUBCUTANEOUS DAILY
Status: DISCONTINUED | OUTPATIENT
Start: 2023-03-15 | End: 2023-03-16 | Stop reason: HOSPADM

## 2023-03-14 RX ORDER — ONDANSETRON 2 MG/ML
4 INJECTION INTRAMUSCULAR; INTRAVENOUS AS NEEDED
Status: DISCONTINUED | OUTPATIENT
Start: 2023-03-14 | End: 2023-03-14

## 2023-03-14 RX ORDER — POLYETHYLENE GLYCOL 3350 17 G/17G
17 POWDER, FOR SOLUTION ORAL DAILY
Status: DISCONTINUED | OUTPATIENT
Start: 2023-03-15 | End: 2023-03-16 | Stop reason: HOSPADM

## 2023-03-14 RX ORDER — HYDROMORPHONE HYDROCHLORIDE 2 MG/ML
INJECTION, SOLUTION INTRAMUSCULAR; INTRAVENOUS; SUBCUTANEOUS AS NEEDED
Status: DISCONTINUED | OUTPATIENT
Start: 2023-03-14 | End: 2023-03-14 | Stop reason: HOSPADM

## 2023-03-14 RX ORDER — FENTANYL CITRATE 50 UG/ML
INJECTION, SOLUTION INTRAMUSCULAR; INTRAVENOUS AS NEEDED
Status: DISCONTINUED | OUTPATIENT
Start: 2023-03-14 | End: 2023-03-14 | Stop reason: HOSPADM

## 2023-03-14 RX ORDER — SODIUM CHLORIDE 0.9 % (FLUSH) 0.9 %
5-40 SYRINGE (ML) INJECTION AS NEEDED
Status: DISCONTINUED | OUTPATIENT
Start: 2023-03-14 | End: 2023-03-16 | Stop reason: HOSPADM

## 2023-03-14 RX ORDER — NEOSTIGMINE METHYLSULFATE 1 MG/ML
INJECTION, SOLUTION INTRAVENOUS AS NEEDED
Status: DISCONTINUED | OUTPATIENT
Start: 2023-03-14 | End: 2023-03-14 | Stop reason: HOSPADM

## 2023-03-14 RX ORDER — AMOXICILLIN 250 MG
1 CAPSULE ORAL 2 TIMES DAILY
Status: DISCONTINUED | OUTPATIENT
Start: 2023-03-14 | End: 2023-03-16 | Stop reason: HOSPADM

## 2023-03-14 RX ORDER — HYDROMORPHONE HYDROCHLORIDE 1 MG/ML
0.2 INJECTION, SOLUTION INTRAMUSCULAR; INTRAVENOUS; SUBCUTANEOUS
Status: DISCONTINUED | OUTPATIENT
Start: 2023-03-14 | End: 2023-03-14

## 2023-03-14 RX ORDER — GLYCOPYRROLATE 0.2 MG/ML
INJECTION INTRAMUSCULAR; INTRAVENOUS AS NEEDED
Status: DISCONTINUED | OUTPATIENT
Start: 2023-03-14 | End: 2023-03-14 | Stop reason: HOSPADM

## 2023-03-14 RX ORDER — SODIUM CHLORIDE 0.9 % (FLUSH) 0.9 %
5-40 SYRINGE (ML) INJECTION EVERY 8 HOURS
Status: DISCONTINUED | OUTPATIENT
Start: 2023-03-14 | End: 2023-03-16 | Stop reason: HOSPADM

## 2023-03-14 RX ORDER — NALOXONE HYDROCHLORIDE 0.4 MG/ML
0.4 INJECTION, SOLUTION INTRAMUSCULAR; INTRAVENOUS; SUBCUTANEOUS AS NEEDED
Status: DISCONTINUED | OUTPATIENT
Start: 2023-03-14 | End: 2023-03-15

## 2023-03-14 RX ORDER — FACIAL-BODY WIPES
10 EACH TOPICAL DAILY PRN
Status: DISCONTINUED | OUTPATIENT
Start: 2023-03-16 | End: 2023-03-16 | Stop reason: HOSPADM

## 2023-03-14 RX ORDER — SODIUM CHLORIDE 9 MG/ML
125 INJECTION, SOLUTION INTRAVENOUS CONTINUOUS
Status: DISPENSED | OUTPATIENT
Start: 2023-03-14 | End: 2023-03-15

## 2023-03-14 RX ADMIN — ONDANSETRON HYDROCHLORIDE 4 MG: 2 INJECTION, SOLUTION INTRAMUSCULAR; INTRAVENOUS at 14:47

## 2023-03-14 RX ADMIN — SODIUM CHLORIDE 75 ML/HR: 9 INJECTION, SOLUTION INTRAVENOUS at 17:30

## 2023-03-14 RX ADMIN — SODIUM CHLORIDE, POTASSIUM CHLORIDE, SODIUM LACTATE AND CALCIUM CHLORIDE 75 ML/HR: 600; 310; 30; 20 INJECTION, SOLUTION INTRAVENOUS at 13:06

## 2023-03-14 RX ADMIN — CEFAZOLIN 2 G: 1 INJECTION, POWDER, FOR SOLUTION INTRAMUSCULAR; INTRAVENOUS; PARENTERAL at 13:45

## 2023-03-14 RX ADMIN — Medication 3 AMPULE: at 13:06

## 2023-03-14 RX ADMIN — OXYCODONE HYDROCHLORIDE 5 MG: 5 TABLET ORAL at 20:06

## 2023-03-14 RX ADMIN — ROCURONIUM BROMIDE 50 MG: 10 INJECTION INTRAVENOUS at 13:28

## 2023-03-14 RX ADMIN — SODIUM CHLORIDE, PRESERVATIVE FREE 10 ML: 5 INJECTION INTRAVENOUS at 17:28

## 2023-03-14 RX ADMIN — SODIUM CHLORIDE, PRESERVATIVE FREE 10 ML: 5 INJECTION INTRAVENOUS at 06:18

## 2023-03-14 RX ADMIN — ACETAMINOPHEN 1000 MG: 500 TABLET ORAL at 13:09

## 2023-03-14 RX ADMIN — SENNOSIDES AND DOCUSATE SODIUM 1 TABLET: 50; 8.6 TABLET ORAL at 17:28

## 2023-03-14 RX ADMIN — ACETAMINOPHEN 325MG 650 MG: 325 TABLET ORAL at 17:27

## 2023-03-14 RX ADMIN — OXYCODONE HYDROCHLORIDE 5 MG: 5 TABLET ORAL at 06:17

## 2023-03-14 RX ADMIN — ACETAMINOPHEN 325MG 650 MG: 325 TABLET ORAL at 00:38

## 2023-03-14 RX ADMIN — OXYCODONE HYDROCHLORIDE 5 MG: 5 TABLET ORAL at 03:00

## 2023-03-14 RX ADMIN — FENTANYL CITRATE 25 MCG: 50 INJECTION, SOLUTION INTRAMUSCULAR; INTRAVENOUS at 13:19

## 2023-03-14 RX ADMIN — SODIUM CHLORIDE, PRESERVATIVE FREE 10 ML: 5 INJECTION INTRAVENOUS at 21:17

## 2023-03-14 RX ADMIN — GLYCOPYRROLATE 0.4 MG: 0.2 INJECTION, SOLUTION INTRAMUSCULAR; INTRAVENOUS at 14:49

## 2023-03-14 RX ADMIN — HYDROMORPHONE HYDROCHLORIDE 0.4 MG: 2 INJECTION, SOLUTION INTRAMUSCULAR; INTRAVENOUS; SUBCUTANEOUS at 14:18

## 2023-03-14 RX ADMIN — PROPOFOL 150 MG: 10 INJECTION, EMULSION INTRAVENOUS at 13:28

## 2023-03-14 RX ADMIN — DEXAMETHASONE SODIUM PHOSPHATE 8 MG: 4 INJECTION, SOLUTION INTRAMUSCULAR; INTRAVENOUS at 13:36

## 2023-03-14 RX ADMIN — OXYCODONE HYDROCHLORIDE 10 MG: 5 TABLET ORAL at 23:26

## 2023-03-14 RX ADMIN — Medication 3 MG: at 14:49

## 2023-03-14 RX ADMIN — PROPOFOL 50 MG: 10 INJECTION, EMULSION INTRAVENOUS at 13:32

## 2023-03-14 RX ADMIN — SODIUM CHLORIDE 40 MCG/MIN: 900 INJECTION, SOLUTION INTRAVENOUS at 13:48

## 2023-03-14 RX ADMIN — CASTOR OIL AND BALSAM, PERU: 788; 87 OINTMENT TOPICAL at 20:07

## 2023-03-14 RX ADMIN — OXYCODONE HYDROCHLORIDE 5 MG: 5 TABLET ORAL at 10:07

## 2023-03-14 RX ADMIN — SODIUM CHLORIDE 75 ML/HR: 9 INJECTION, SOLUTION INTRAVENOUS at 07:28

## 2023-03-14 RX ADMIN — LIDOCAINE HYDROCHLORIDE 60 MG: 20 INJECTION, SOLUTION EPIDURAL; INFILTRATION; INTRACAUDAL; PERINEURAL at 13:28

## 2023-03-14 RX ADMIN — ACETAMINOPHEN 325MG 650 MG: 325 TABLET ORAL at 06:17

## 2023-03-14 RX ADMIN — Medication 80 MCG: at 13:46

## 2023-03-14 RX ADMIN — PROPOFOL 50 MG: 10 INJECTION, EMULSION INTRAVENOUS at 13:34

## 2023-03-14 RX ADMIN — KETAMINE HYDROCHLORIDE 30 MG: 10 INJECTION, SOLUTION INTRAMUSCULAR; INTRAVENOUS at 14:05

## 2023-03-14 RX ADMIN — FENTANYL CITRATE 75 MCG: 50 INJECTION, SOLUTION INTRAMUSCULAR; INTRAVENOUS at 13:28

## 2023-03-14 RX ADMIN — HYDROMORPHONE HYDROCHLORIDE 0.2 MG: 2 INJECTION, SOLUTION INTRAMUSCULAR; INTRAVENOUS; SUBCUTANEOUS at 14:47

## 2023-03-14 NOTE — ANESTHESIA POSTPROCEDURE EVALUATION
Procedure(s):  RIGHT FEMORAL INTERTROCHANTERIC NAIL INSERTION. general    Anesthesia Post Evaluation        Patient location during evaluation: PACU  Note status: Adequate. Level of consciousness: responsive to verbal stimuli and sleepy but conscious  Pain management: satisfactory to patient  Airway patency: patent  Anesthetic complications: no  Cardiovascular status: acceptable  Respiratory status: acceptable  Hydration status: acceptable  Comments: +Post-Anesthesia Evaluation and Assessment    Patient: Annetta Worthy MRN: [id number]  SSN: [id number]   YOB: 1945  Age: 69 y.o. Sex: female      Cardiovascular Function/Vital Signs    BP (!) 132/49 (BP 1 Location: Right upper arm, BP Patient Position: At rest)   Pulse 61   Temp 36.7 °C (98 °F)   Resp 12   Ht 5' 7\" (1.702 m)   Wt 63.5 kg (139 lb 15.9 oz)   SpO2 98%   BMI 21.93 kg/m²     Patient is status post Procedure(s):  RIGHT FEMORAL INTERTROCHANTERIC NAIL INSERTION. Nausea/Vomiting: Controlled. Postoperative hydration reviewed and adequate. Pain:  Pain Scale 1: Visual (03/14/23 1545)  Pain Intensity 1: 0 (03/14/23 1530)   Managed. Neurological Status:   Neuro (WDL): Exceptions to WDL (03/14/23 1514)   At baseline. Mental Status and Level of Consciousness: Arousable. Pulmonary Status:   O2 Device: Nasal cannula (03/14/23 1545)   Adequate oxygenation and airway patent. Complications related to anesthesia: None    Post-anesthesia assessment completed. No concerns. Signed By: Mansi Rosen MD    3/14/2023  Post anesthesia nausea and vomiting:  controlled      INITIAL Post-op Vital signs:   Vitals Value Taken Time   /50 03/14/23 1550   Temp 36.7 °C (98 °F) 03/14/23 1545   Pulse 61 03/14/23 1551   Resp 8 03/14/23 1551   SpO2 97 % 03/14/23 1551   Vitals shown include unvalidated device data.

## 2023-03-14 NOTE — PROGRESS NOTES
Bedside shift change report given to Arlen Herrera RN (oncoming nurse) by Jaciel Perez RN (offgoing nurse). Report included the following information SBAR, Kardex, Procedure Summary, Intake/Output, MAR, Accordion, Recent Results, Med Rec Status, Cardiac Rhythm NSR, and Alarm Parameters .

## 2023-03-14 NOTE — OP NOTES
OPERATIVE REPORT     PATIENT:  Jose Laguerre                                        MRN:  [id number]  :  1945  ADMITTING PHYSICIAN:  Holly Sanches MD  ______________________________________________________________________     DATE OF SURGERY:  3/14/2023  SURGEON:  Holly Sanches MD        ASSISTANT:       PREOPERATIVE DIAGNOSIS(ES):  Right Intertrochanteric HIP FRACTURE     POSTOPERATIVE DIAGNOSIS(ES):  Same     PROCEDURE(S) PERFORMED:  Procedure(s):  RIGHT HIP IMN     FINDINGS: Right intertrochanteric hip fracture    ANESTHESIA:   General.     TOURNIQUET TIME:  * No tourniquets in log *     IMPLANTS:    Implant Name Type Inv. Item Serial No.  Lot No. LRB No. Used Action   NAIL TROCH JOSE 3 125D [id number] -- STRL - SNA  NAIL TROCH JOSE 3 125D [id number] -- STRL NA [employer] [id number] Right 1 Implanted   SCR BNE LAG GAMMA 3 10.5X90MM -- TI STRL - SNA  SCR BNE LAG GAMMA 3 10.5X90MM -- TI STRL NA [employer] [id number] Right 1 Implanted   SCR BNE LCK T2 FT 5X35MM TI -- STRL - SNA  SCR BNE LCK T2 FT 5X35MM TI -- STRL NA [employer] [id number] Right 1 Implanted          INDICATIONS FOR THE PROCEDURE:  69 y.o. female who sustained a ground level fall onto her right side resulting in a right intertrochanteric hip fracture. We discussed the risks and benefits of non-operative vs surgical intervention. Give the location and nature of the fracture, it was felt that she would benefit from surgery. After the discussion, she elected to proceed. DESCRIPTION OF PROCEDURE:    The correct patient, extremity, and operation were all identified in the preoperative holding area. I marked the operative extremity. the patient was then brought back to the operating room and positioned supine on the [place] table. General anesthesia was administered. Bilateral lower extremities were then placed into the boots and both legs were secured to the [place] table.   All bony prominences were well padded, including the perineal region. Traction, adduction, and internal rotation maneuver was applied to the operative extremity to reduce the fracture. Orthogonal fluoroscopic views were obtained to confirm satisfactory reduction. Once we were satisfied with the reduction, the operative extremity was then prepped and draped in the usual sterile fashion. A preoperative time-out was called and again the correct patient operation and extremity were all identified, preoperative antibiotics (Ancef) were given IV within 30 minutes of the incision, all parties were in agreement and we proceeded with the operation. The tip of the greater trochanter was localized under fluoroscopy. A longitudinal incision was made from the tip of the greater trochanter extending proximally. Sharp dissection performed through the skin and superficial tissues and through the fascia. The tip of the greater trochanter was identified. A K-wire was then inserted from the tip of the greater trochanter towards the lesser trochanter. We confirmed the start point and the trajectory of the K-wire under orthogonal fluoroscopic use. Once we were satisfied with the trajectory as well as the start point in both the AP and lateral views, an opening reamer was then used to access the canal of the proximal femur. A [place] 10 by 180 mm, 125 degree Gamma nail was then inserted into the proximal femoral canal.  This was advanced antegrade until we were satisfied with the position based on the trajectory of the femoral neck screw. The trocar was then inserted into the jig to localize the insertion point of the femoral neck screw. Incision was then made overlying this area along the lateral aspect of the femur. The IT band was then longitudinally incised to expose the lateral border of the femur. The trocar was then advanced to the femur and a guidewire was then advanced into the femoral neck and head.   This was done under fluoroscopic guidance. We adjusted the position of the guidewire until we were satisfied with the position under both AP and lateral fluoroscopic views. The depth of the guidewire was then measured and an 90 mm lag screw was then placed. This was done under fluoroscopic guidance. We then compressed the fracture through the lag screw. A distal interlocking screw was then placed through the jig. We confirmed the position of the screw on both the AP and lateral views. Final fluoroscopic images were taken and independently interpreted by me to show satisfactory alignment and reduction of her hip fracture as well as satisfactory position of the hardware. The incisions were then copiously irrigated with sterile saline. The deep fascial layers were closed with 0 Vicryl. The subcuticular layer was closed 2-0 Vicryl the skin was closed with staples. Sterile dressing was then applied. The patient was then awoken from general anesthesia and transferred to PACU in stable condition. COMPLICATIONS:  None. POSTOPERATIVE PLAN:  The patient will be weightbearing as tolerated on the operative extremity. Lovenox will be used for DVT prophylaxis. The patient will follow up in 10-14 days for repeat evaluation and likely staple removal.  Right hip AP and cross table lateral xrays should be obtained at the followup visit.

## 2023-03-14 NOTE — PROGRESS NOTES
TRANSFER - OUT REPORT:    Verbal report given to KAREN Alberto(name) on Jassi Boyer  being transferred to [facility](unit) for routine post - op       Report consisted of patients Situation, Background, Assessment and   Recommendations(SBAR). Information from the following report(s) SBAR, Kardex, OR Summary, and MAR was reviewed with the receiving nurse. Lines:   Peripheral IV 03/13/23 Right Antecubital (Active)   Site Assessment Clean, dry, & intact 03/14/23 1514   Phlebitis Assessment 0 03/14/23 1514   Infiltration Assessment 0 03/14/23 1234   Dressing Status Clean, dry, & intact 03/14/23 1234   Dressing Type Transparent 03/14/23 1234   Hub Color/Line Status Flushed;Pink 03/14/23 1234   Action Taken Open ports on tubing capped 03/14/23 0300   Alcohol Cap Used Yes 03/14/23 0300       Peripheral IV 03/14/23 Left;Posterior Forearm (Active)   Site Assessment Clean, dry, & intact 03/14/23 1514   Phlebitis Assessment 0 03/14/23 1514   Infiltration Assessment 0 03/14/23 1300   Dressing Status Clean, dry, & intact 03/14/23 1300   Dressing Type Transparent;Tape 03/14/23 1300   Hub Color/Line Status Infusing;Pink 03/14/23 1300        Opportunity for questions and clarification was provided.       Patient transported with:   Monitor  O2 @ 2 liters  Registered Nurse  [facility]

## 2023-03-14 NOTE — PROGRESS NOTES
Bedside shift change report given to Yakov RN (oncoming nurse) by Geovanna Stockton RN (offgoing nurse). Report included the following information SBAR, Kardex, Procedure Summary, Intake/Output, MAR, Accordion, Recent Results, Med Rec Status, Cardiac Rhythm NSR, and Alarm Parameters .

## 2023-03-14 NOTE — PROGRESS NOTES
Transition of Care Plan:  RUR: 11%  Disposition: anticipate SNF- [facility]  If SNF or IPR: Date FOC offered: 3/14/23  Date FOC received: 3/14/23  Date authorization started with reference number: n/a  Date authorization received and expires: n/a  Accepting facility:  Follow up appointments: ortho  DME needed: n/a to go to rehab  Transportation at Discharge: [address] [place] [address] or means to access home: yes      IM Medicare Letter: to be provided  Caregiver Contact: patrick Kelly [phone]  Discharge Caregiver contacted prior to discharge? To be contacted upon pt request  Care Conference needed?: no                  Reason for Admission: hip fracture                     RUR Score:  11%                   Plan for utilizing home health: per recommendation       PCP: First and Last name:  Ana Rosa Marsh (doesn't know last name)   Name of Practice: [address]   Are you a current patient: Yes/No: yes   Approximate date of last visit: unable to recall   Can you participate in a virtual visit with your PCP: yes                    Current Advanced Directive/Advance Care Plan: patient declined at this time                  Transition of Care Plan:                      CM made room visit with patient who was alert and oriented. Pt reported she lost her last \"person\" she has this last Thanksgiving and has no other emergency contact. CM inquired if patient has any other friends or family. Pt stated she does have a niece that lives in [facility] [phone], that can be listed as the emergency contact. Pt lives alone in a ADA accessible single level home with 0 jadon. Pt has a cane that she uses when she is outside of her home, does not use while inside of home. Pt also has a raised toilet seat and a medic alert band. At baseline pt is independent with ADLs and driving.  Pt has used HH through [facility] and has been to SNF [facility] and another facility pt unable to recall the name of but reported having an awful experience. Pt is hoping to return home upon d/c. CM informed patient that rehab is likely going to be needed but will determine that post op after working with therapy. Pt reported if she has to go to SNF she wants to go to [address] [place] [address] again. FOC signed and placed on bedside chart. Referral sent to [facility] for review. CM provided patient with SNF list and requested she review for additional SNF choices. Care Management Interventions  PCP Verified by CM:  Yes  Mode of Transport at Discharge: BLS  Transition of Care Consult (CM Consult): Discharge Planning  Discharge Durable Medical Equipment: No  Physical Therapy Consult: Yes  Occupational Therapy Consult: Yes  Speech Therapy Consult: No  Support Systems: Other Family Member(s)  Confirm Follow Up Transport: Family  The Plan for Transition of Care is Related to the Following Treatment Goals : SNF  The Patient and/or Patient Representative was Provided with a Choice of Provider and Agrees with the Discharge Plan?: Yes  Freedom of Choice List was Provided with Basic Dialogue that Supports the Patient's Individualized Plan of Care/Goals, Treatment Preferences and Shares the Quality Data Associated with the Providers?: Yes  Discharge Location  Patient Expects to be Discharged to[de-identified] [facility], [place], [address]

## 2023-03-14 NOTE — PROGRESS NOTES
[facility] Well Child Check    ASSESSMENT & PLAN  Torie Zayas is a 14  y.o. 0  m.o. who has normal growth and normal development.    Diagnoses and all orders for this visit:    Encounter for routine child health examination without abnormal findings  -     Hearing Screening  -     Vision Screening    Need for vaccination  -     Meningococcal MCV4P        Return to clinic in 1 year for a Well Child Check or sooner as needed     Sports Px form completed.    Patient was seen with professional bradley , Blanca Olvera.      IMMUNIZATIONS/LABS  Immunizations were reviewed and orders were placed as appropriate.     Immunization History   Administered Date(s) Administered     DTaP / Hep B / IPV 2006, 2006, 2006, 04/20/2011     DTaP, historic 06/20/2007     HPV 9 Valent 04/19/2018     Hep A, historic 09/17/2007, 03/19/2008     Hib (PRP-T) 2006, 2006, 06/16/2009     Influenza O6a2-72, 12/14/2009     Influenza, inj, historic,unspecified 2006, 10/24/2008, 09/23/2009, 11/15/2010, 09/27/2011     Influenza, seasonal,quad inj 36+ mos 10/19/2015, 11/03/2016, 10/12/2018     Influenza, seasonal,quad inj 6-35 mos 09/23/2009, 08/30/2012     Influenza,live, Nasal Laiv4 10/21/2013, 10/31/2014     MMR 06/20/2007, 04/07/2010     Meningococcal MCV4P 04/02/2019     Pneumo Conj 7-V(before 2010) 2006, 2006, 2006, 06/16/2009     Tdap 04/19/2018     Varicella 06/20/2007, 04/07/2010         REFERRALS  Dental:  The patient has already established care with a dentist.  Other:  No additional referrals were made at this time.    ANTICIPATORY GUIDANCE  I have reviewed age appropriate anticipatory guidance.    HEALTH HISTORY  Do you have any concerns that you'd like to discuss today?: No concerns   Amirah.      Roomed by: Lilibeth Flood.    Accompanied by Mother    Refills needed? No    Do you have any forms that need to be filled out? Yes Sport physical    services provided by:  Hospitalist Progress Note    NAME: Rylee Solomon   :  1945   MRN:  [id number]       Assessment / Plan:  Right intertrochanteric fracture  S/p mechanical fall:  Pain control  Plan for surgery today  Can start dvt ppx after surgery  Leukocytosis likey reactive, resolved now     Elevated CK:  Mild elevation to 600  Gentle hydration, trending down repeat in AM     HTN:  C/w ACE (ramipril switched to lisinopril)     History of DM: Was on metformin in the past. During her last hospitalization, Hba1c was 4 and medications was stopped. Osteopenia:  Takes alendronate weekly     ETOH use:  Drinks 2 glassess of wine daily     Hyperlipidemia:  Cw statin      18.5 - 24.9 Normal weight / Body mass index is 21.93 kg/m². Estimated discharge date:   Barriers:    Code status: Full  Prophylaxis: To be started after surgery  Recommended Disposition:  TBD     Subjective:     Chief Complaint / Reason for Physician Visit  \"Patient seen today, has moderate pain, n.p.o. 4 OR today\". Discussed with RN events overnight. Review of Systems:  Symptom Y/N Comments  Symptom Y/N Comments   Fever/Chills    Chest Pain     Poor Appetite    Edema     Cough    Abdominal Pain     Sputum    Joint Pain     SOB/COULTER    Pruritis/Rash     Nausea/vomit    Tolerating PT/OT     Diarrhea    Tolerating Diet     Constipation    Other       Could NOT obtain due to:      Objective:     VITALS:   Last 24hrs VS reviewed since prior progress note.  Most recent are:  Patient Vitals for the past 24 hrs:   Temp Pulse Resp BP SpO2   23 0756 98.4 °F (36.9 °C) 74 16 (!) 130/58 97 %   23 0317 98.4 °F (36.9 °C) 80 18 132/60 96 %   23 2258 97.9 °F (36.6 °C) 86 16 (!) 147/64 98 %   23 1941 98.4 °F (36.9 °C) 98 18 (!) 144/66 96 %   23 1505 99 °F (37.2 °C) (!) 110 16 (!) 159/70 96 %   23 1400 -- (!) 103 20 (!) 144/66 94 %   23 1302 -- (!) 103 26 (!) 155/59 93 %   23 1202 -- (!) 106 25 (!) 165/84 98 % 03/13/23 1132 -- (!) 111 25 (!) 154/83 97 %   03/13/23 1042 -- 96 20 110/80 98 %   03/13/23 1022 -- -- -- -- 100 %   03/13/23 1009 97.8 °F (36.6 °C) (!) 102 19 (!) 171/70 100 %       Intake/Output Summary (Last 24 hours) at 3/14/2023 2850  Last data filed at 3/14/2023 0357  Gross per 24 hour   Intake 837.5 ml   Output 550 ml   Net 287.5 ml        I had a face to face encounter and independently examined this patient on 3/14/2023, as outlined below:  PHYSICAL EXAM:  General: WD, WN. Alert, cooperative, no acute distress    EENT:  EOMI. Anicteric sclerae. MMM  Resp:  CTA bilaterally, no wheezing or rales. No accessory muscle use  CV:  Regular  rhythm,  No edema  GI:  Soft, Non distended, Non tender. +Bowel sounds  Neurologic:  Alert and oriented X 3, normal speech,   Ext                   right hip tenderness  Psych:   Good insight. Not anxious nor agitated  Skin:  No rashes. No jaundice    Reviewed most current lab test results and cultures  YES  Reviewed most current radiology test results   YES  Review and summation of old records today    NO  Reviewed patient's current orders and MAR    YES  PMH/ reviewed - no change compared to H&P  ________________________________________________________________________  Care Plan discussed with:    Comments   Patient x    Family      RN     Care Manager x    Consultant                        Multidiciplinary team rounds were held today with , nursing, pharmacist and clinical coordinator. Patient's plan of care was discussed; medications were reviewed and discharge planning was addressed.      ________________________________________________________________________  Total NON critical care TIME:  35   Minutes    Total CRITICAL CARE TIME Spent:   Minutes non procedure based      Comments   >50% of visit spent in counseling and coordination of care     ________________________________________________________________________  Kelsey Richardson MD     Procedures: Agency     /Agency Name [facility]   Location of  Services: In person        Do you have any significant health concerns in your family history?: No  No family history on file.  Since your last visit, have there been any major changes in your family, such as a move, job change, separation, divorce, or death in the family?: No  Has a lack of transportation kept you from medical appointments?:N/A    Home  Who lives in your home?: Parents, 3 siblings, aunt, uncle, 2 cousins  Social History     Social History Narrative     Not on file     Do you have any concerns about losing your housing?: No  Is your housing safe and comfortable?: Yes  Do you have any trouble with sleep?:  No    Education  What school do you child attend?:  [employer]  What grade are you in?:  7th  How do you perform in school (grades, behavior, attention, homework?: Good     Eating  Do you eat regular meals including fruits and vegetables?:  yes,   What are you drinking (cow's milk, water, soda, juice, sports drinks, energy drinks, etc)?: cow's milk- 1%, water, juice  Have you been worried that you don't have enough food?: No  Do you have concerns about your body or appearance?:  No    Activities  Do you have friends?:  yes  Do you get at least one hour of physical activity per day?:  Yes   How many hours a day are you in front of a screen other than for schoolwork (computer, TV, phone)?:  2 -3    What do you do for exercise?:  Run,  Gym.  Do you have interest/participate in community activities/volunteers/school sports?:  no    MENTAL HEALTH SCREENING  PHQ-2 Total Score: 0 (4/19/2018  4:33 PM)    No data recorded    VISION/HEARING  Vision: Completed. See Results  Hearing:  Completed. See Results     Hearing Screening    125Hz 250Hz 500Hz 1000Hz 2000Hz 3000Hz 4000Hz 6000Hz 8000Hz   Right ear:   40 20 30  20 20    Left ear:   35 25 20  20 20       Visual Acuity Screening    Right eye Left  "eye Both eyes   Without correction: 20/63 20/50 20/40   With correction:      Comments: Plus Lens: Pass: blurring of vision with +2.50 lens glasses        TB Risk Assessment:  The patient and/or parent/guardian answer positive to:  parents born outside of the [place]    Dyslipidemia Risk Screening  Have either of your parents or any of your grandparents had a stroke or heart attack before age 55?: No  Any parents with high cholesterol or currently taking medications to treat?: No     Dental  When was the last time you saw the dentist?: 3-6 months ago      There is no problem list on file for this patient.      Drugs  Does the patient use tobacco/alcohol/drugs?:  no    Safety  Does the patient have any safety concerns (peer or home)?:  no  Does the patient use safety belts, helmets and other safety equipment?:  yes    Sex  Have you ever had sex?:  No    MEASUREMENTS  Height:  4' 11.45\" (1.51 m)  Weight: 90 lb 4 oz (40.9 kg)  BMI: Body mass index is 17.95 kg/m .  Blood Pressure: 86/60  Blood pressure percentiles are 2 % systolic and 43 % diastolic based on the 2017 AAP Clinical Practice Guideline. Blood pressure percentile targets: 90: 118/76, 95: 122/79, 95 + 12 mmH/91.    PHYSICAL EXAM  Physical Exam   Eyes: EOM full, pupils normal, conjunctivae normal  Ears: TM's and canals normal  Oropharynx: normal  Neck: supple without adenopathy or thyromegaly  Lungs: normal  Heart: regular rhythm, normal rate, no murmur  Abdomen: no HSM, mass or tenderness  Extremities: FROM, normal strength and sensation      " see electronic medical records for all procedures/Xrays and details which were not copied into this note but were reviewed prior to creation of Plan. LABS:  I reviewed today's most current labs and imaging studies. Pertinent labs include:  Recent Labs     03/14/23  0340 03/13/23  1010   WBC 7.4 13.2*   HGB 10.6* 12.4   HCT 31.7* 36.7    234     Recent Labs     03/14/23  0340 03/13/23  1011 03/13/23  1010     --  142   K 3.4*  --  3.9     --  108   CO2 24  --  25   *  --  158*   BUN 17  --  19   CREA 0.63  --  0.84   CA 8.0*  --  8.8   ALB  --   --  3.6   TBILI  --   --  0.6   ALT  --   --  42   INR  --  0.9  --        Signed:  Kelsey Richardson MD

## 2023-03-14 NOTE — PROGRESS NOTES
TRANSFER - IN REPORT:    Verbal report received from [facility]-LELO and KOFI Nagy(name) on Jasvir Andino  being received from OR(unit) for routine post - op      Report consisted of patients Situation, Background, Assessment and   Recommendations(SBAR). Information from the following report(s) SBAR, Kardex, OR Summary, and MAR was reviewed with the receiving nurse. Opportunity for questions and clarification was provided. Assessment completed upon patients arrival to unit and care assumed.

## 2023-03-14 NOTE — ANESTHESIA PREPROCEDURE EVALUATION
Relevant Problems   CARDIOVASCULAR   (+) Hypertension       Anesthetic History   No history of anesthetic complications            Review of Systems / Medical History  Patient summary reviewed, nursing notes reviewed and pertinent labs reviewed    Pulmonary          Smoker      Comments: Former smoker   Neuro/Psych         Dementia    Comments: T 12 compression deformity with T 11-12 stenosis Cardiovascular    Hypertension: well controlled              Exercise tolerance: <4 METS     GI/Hepatic/Renal  Within defined limits              Endo/Other    Diabetes: well controlled, type 2    Arthritis     Other Findings              Physical Exam    Airway  Mallampati: II  TM Distance: 4 - 6 cm  Neck ROM: normal range of motion   Mouth opening: Normal     Cardiovascular  Regular rate and rhythm,  S1 and S2 normal,  no murmur, click, rub, or gallop  Rhythm: regular  Rate: normal         Dental    Dentition: Caps/crowns and Implants  Comments: Some missing, denies any loose   Pulmonary  Breath sounds clear to auscultation               Abdominal  GI exam deferred       Other Findings   Comments: Patient has a left anterior shoulder dislocation of unknown duration.          Anesthetic Plan    ASA: 3  Anesthesia type: general    Monitoring Plan: BIS      Induction: Intravenous  Anesthetic plan and risks discussed with: Patient      Conventional grade 1 view here 3/19/21

## 2023-03-14 NOTE — BRIEF OP NOTE
Brief Postoperative Note    Patient: Jack Diggs  YOB: 1945  MRN: [id number]    Date of Procedure: 3/14/2023     Pre-Op Diagnosis: Right hip fracture    Post-Op Diagnosis: Same as preoperative diagnosis. Procedure(s):  RIGHT FEMORAL INTERTROCHANTERIC NAIL INSERTION    Surgeon(s):  Randall Pino MD    Surgical Assistant: None    Anesthesia: General     Estimated Blood Loss (mL): 933     Complications: None    Specimens: * No specimens in log *     Implants:   Implant Name Type Inv.  Item Serial No.  Lot No. LRB No. Used Action   NAIL TROCH JOSE 3 125D [id number] -- STRL - SNA  NAIL TROCH JOSE 3 125D [id number] -- STRL NA [employer] NoFlo_ [id number] Right 1 Implanted   SCR BNE LAG GAMMA 3 10.5X90MM -- TI STRL - SNA  SCR BNE LAG GAMMA 3 10.5X90MM -- TI STRL NA [employer]_WD [id number] Right 1 Implanted   SCR BNE LCK T2 FT 5X35MM TI -- STRL - SNA  SCR BNE LCK T2 FT 5X35MM TI -- STRL NA [employer] HOW_WD [id number] Right 1 Implanted       Drains: * No LDAs found *    Findings: Intertrochanteric hip fracture    Electronically Signed by Timothy Mancilla MD on 3/14/2023 at 3:13 PM

## 2023-03-14 NOTE — PERIOP NOTES
1234 - PT ARRIVED INTO PRE-OP HOLDING 18.  PT A&OX4, SHETTY SPON AND TO COMMAND. LEFT ARM RIGID AT BASELINE. MULTI ECCHYMOTIC AREAS NOTED - FACE, LEFT UPPER ARM RIGHT ELBOW. PT C/O 7/10 ON PAIN SCALE - REPOSITIONED FOR COMFORT. PRE-OP TCHING DONE - PT VERBALIZES UNDERSTANDING. SR UP X3. WAITING SURGERY.

## 2023-03-14 NOTE — PROGRESS NOTES
Problem: Pressure Injury - Risk of  Goal: *Prevention of pressure injury  Description: Document Kwabena Scale and appropriate interventions in the flowsheet. Outcome: Progressing Towards Goal  Note: Pressure Injury Interventions:       Moisture Interventions: Absorbent underpads    Activity Interventions: Assess need for specialty bed    Mobility Interventions: PT/OT evaluation, Turn and reposition approx. every two hours(pillow and wedges)    Nutrition Interventions: Document food/fluid/supplement intake    Friction and Shear Interventions: Apply protective barrier, creams and emollients, HOB 30 degrees or less, Lift sheet, Lift team/patient mobility team, Minimize layers, Transferring/repositioning devices                Problem: Patient Education: Go to Patient Education Activity  Goal: Patient/Family Education  Outcome: Progressing Towards Goal     Problem: Falls - Risk of  Goal: *Absence of Falls  Description: Document Jessica Fall Risk and appropriate interventions in the flowsheet.   Outcome: Progressing Towards Goal  Note: Fall Risk Interventions:  Mobility Interventions: Communicate number of staff needed for ambulation/transfer         Medication Interventions: Patient to call before getting OOB    Elimination Interventions: Patient to call for help with toileting needs    History of Falls Interventions: Bed/chair exit alarm         Problem: Patient Education: Go to Patient Education Activity  Goal: Patient/Family Education  Outcome: Progressing Towards Goal     Problem: Pain  Goal: *Control of Pain  Outcome: Progressing Towards Goal  Goal: *PALLIATIVE CARE:  Alleviation of Pain  Outcome: Progressing Towards Goal     Problem: Patient Education: Go to Patient Education Activity  Goal: Patient/Family Education  Outcome: Progressing Towards Goal     Problem: Patient Education: Go to Patient Education Activity  Goal: Patient/Family Education  Outcome: Progressing Towards Goal     Problem: Hip Fracture:Day of Admission Pre-op  Goal: Off Pathway (Use only if patient is Off Pathway)  Outcome: Progressing Towards Goal  Goal: Activity/Safety  Outcome: Progressing Towards Goal  Goal: Consults, if ordered  Outcome: Progressing Towards Goal  Goal: Diagnostic Test/Procedures  Outcome: Progressing Towards Goal  Goal: Nutrition/Diet  Outcome: Progressing Towards Goal  Goal: Medications  Outcome: Progressing Towards Goal  Goal: Respiratory  Outcome: Progressing Towards Goal  Goal: Treatments/Interventions/Procedures  Outcome: Progressing Towards Goal  Goal: Psychosocial  Outcome: Progressing Towards Goal  Goal: *Labs/Tests Within Defined Limits in Preparation for Surgery  Outcome: Progressing Towards Goal  Goal: *Optimal pain control at patient's stated goal  Outcome: Progressing Towards Goal  Goal: *Hemodynamically stable  Outcome: Progressing Towards Goal

## 2023-03-15 LAB
ANION GAP SERPL CALC-SCNC: 3 MMOL/L (ref 5–15)
ATRIAL RATE: 103 BPM
BUN SERPL-MCNC: 12 MG/DL (ref 6–20)
BUN/CREAT SERPL: 20 (ref 12–20)
CALCIUM SERPL-MCNC: 7.9 MG/DL (ref 8.5–10.1)
CALCULATED P AXIS, ECG09: 74 DEGREES
CALCULATED R AXIS, ECG10: 43 DEGREES
CALCULATED T AXIS, ECG11: 75 DEGREES
CHLORIDE SERPL-SCNC: 110 MMOL/L (ref 97–108)
CO2 SERPL-SCNC: 24 MMOL/L (ref 21–32)
CREAT SERPL-MCNC: 0.6 MG/DL (ref 0.55–1.02)
DIAGNOSIS, 93000: NORMAL
GLUCOSE SERPL-MCNC: 123 MG/DL (ref 65–100)
HGB BLD-MCNC: 9.9 G/DL (ref 11.5–16)
P-R INTERVAL, ECG05: 130 MS
POTASSIUM SERPL-SCNC: 4.2 MMOL/L (ref 3.5–5.1)
Q-T INTERVAL, ECG07: 346 MS
QRS DURATION, ECG06: 66 MS
QTC CALCULATION (BEZET), ECG08: 453 MS
SODIUM SERPL-SCNC: 137 MMOL/L (ref 136–145)
VENTRICULAR RATE, ECG03: 103 BPM

## 2023-03-15 PROCEDURE — 74011250637 HC RX REV CODE- 250/637: Performed by: STUDENT IN AN ORGANIZED HEALTH CARE EDUCATION/TRAINING PROGRAM

## 2023-03-15 PROCEDURE — 77010033678 HC OXYGEN DAILY

## 2023-03-15 PROCEDURE — 97165 OT EVAL LOW COMPLEX 30 MIN: CPT | Performed by: OCCUPATIONAL THERAPIST

## 2023-03-15 PROCEDURE — 36415 COLL VENOUS BLD VENIPUNCTURE: CPT

## 2023-03-15 PROCEDURE — 74011000250 HC RX REV CODE- 250: Performed by: STUDENT IN AN ORGANIZED HEALTH CARE EDUCATION/TRAINING PROGRAM

## 2023-03-15 PROCEDURE — 74011250637 HC RX REV CODE- 250/637: Performed by: PHYSICIAN ASSISTANT

## 2023-03-15 PROCEDURE — 85018 HEMOGLOBIN: CPT

## 2023-03-15 PROCEDURE — 74011250636 HC RX REV CODE- 250/636: Performed by: STUDENT IN AN ORGANIZED HEALTH CARE EDUCATION/TRAINING PROGRAM

## 2023-03-15 PROCEDURE — 97530 THERAPEUTIC ACTIVITIES: CPT

## 2023-03-15 PROCEDURE — 80048 BASIC METABOLIC PNL TOTAL CA: CPT

## 2023-03-15 PROCEDURE — 97161 PT EVAL LOW COMPLEX 20 MIN: CPT

## 2023-03-15 PROCEDURE — 94760 N-INVAS EAR/PLS OXIMETRY 1: CPT

## 2023-03-15 PROCEDURE — 65270000046 HC RM TELEMETRY

## 2023-03-15 RX ADMIN — ENOXAPARIN SODIUM 40 MG: 100 INJECTION SUBCUTANEOUS at 08:29

## 2023-03-15 RX ADMIN — SENNOSIDES AND DOCUSATE SODIUM 1 TABLET: 50; 8.6 TABLET ORAL at 17:57

## 2023-03-15 RX ADMIN — SENNOSIDES AND DOCUSATE SODIUM 1 TABLET: 50; 8.6 TABLET ORAL at 08:29

## 2023-03-15 RX ADMIN — SODIUM CHLORIDE, PRESERVATIVE FREE 10 ML: 5 INJECTION INTRAVENOUS at 12:42

## 2023-03-15 RX ADMIN — SODIUM CHLORIDE, PRESERVATIVE FREE 5 ML: 5 INJECTION INTRAVENOUS at 06:41

## 2023-03-15 RX ADMIN — OXYCODONE HYDROCHLORIDE 10 MG: 5 TABLET ORAL at 08:29

## 2023-03-15 RX ADMIN — OXYCODONE HYDROCHLORIDE 5 MG: 5 TABLET ORAL at 17:57

## 2023-03-15 RX ADMIN — SODIUM CHLORIDE, PRESERVATIVE FREE 10 ML: 5 INJECTION INTRAVENOUS at 12:41

## 2023-03-15 RX ADMIN — SODIUM CHLORIDE 125 ML/HR: 9 INJECTION, SOLUTION INTRAVENOUS at 03:41

## 2023-03-15 RX ADMIN — ROSUVASTATIN CALCIUM 5 MG: 10 TABLET, COATED ORAL at 08:29

## 2023-03-15 RX ADMIN — ACETAMINOPHEN 325MG 650 MG: 325 TABLET ORAL at 12:38

## 2023-03-15 RX ADMIN — OXYCODONE HYDROCHLORIDE 10 MG: 5 TABLET ORAL at 12:37

## 2023-03-15 RX ADMIN — ONDANSETRON 4 MG: 2 INJECTION INTRAMUSCULAR; INTRAVENOUS at 12:37

## 2023-03-15 RX ADMIN — POLYETHYLENE GLYCOL 3350 17 G: 17 POWDER, FOR SOLUTION ORAL at 08:29

## 2023-03-15 RX ADMIN — CASTOR OIL AND BALSAM, PERU: 788; 87 OINTMENT TOPICAL at 08:27

## 2023-03-15 RX ADMIN — CASTOR OIL AND BALSAM, PERU: 788; 87 OINTMENT TOPICAL at 21:19

## 2023-03-15 RX ADMIN — Medication 1 TABLET: at 12:38

## 2023-03-15 RX ADMIN — ACETAMINOPHEN 325MG 650 MG: 325 TABLET ORAL at 17:58

## 2023-03-15 RX ADMIN — OXYCODONE HYDROCHLORIDE 10 MG: 5 TABLET ORAL at 03:38

## 2023-03-15 RX ADMIN — DULOXETINE 30 MG: 30 CAPSULE, DELAYED RELEASE ORAL at 08:29

## 2023-03-15 RX ADMIN — Medication 1 TABLET: at 08:28

## 2023-03-15 RX ADMIN — SODIUM CHLORIDE, PRESERVATIVE FREE 5 ML: 5 INJECTION INTRAVENOUS at 21:19

## 2023-03-15 RX ADMIN — SODIUM CHLORIDE 75 ML/HR: 9 INJECTION, SOLUTION INTRAVENOUS at 21:18

## 2023-03-15 RX ADMIN — ACETAMINOPHEN 325MG 650 MG: 325 TABLET ORAL at 06:41

## 2023-03-15 RX ADMIN — Medication 1 TABLET: at 17:57

## 2023-03-15 NOTE — WOUND CARE
Wound care nurse consult for right arm/elbow wound    70 y/o CF admitted for a closed right hip fracture. Past Medical History:   Diagnosis Date    Diabetes ([address]     High cholesterol     HTN (hypertension)     Osteoporosis      Patient has a right elbow abrasion from fall that sustained her right hip fracture. WOUND POA CONDITIONS    Wound Elbow Right;Posterior 03/15/23 (Active)   Wound Image   03/15/23 1214   Wound Etiology Traumatic 03/15/23 1214   Dressing/Treatment Pharmaceutical agent (see MAR); Open to air 03/15/23 1214   Wound Length (cm) 1 cm 03/15/23 1214   Wound Width (cm) 1 cm 03/15/23 1214   Wound Depth (cm) 0.1 cm 03/15/23 1214   Wound Surface Area (cm^2) 1 cm^2 03/15/23 1214   Wound Volume (cm^3) 0.1 cm^3 03/15/23 1214   Wound Assessment Eschar moist 03/15/23 1214   Drainage Amount None 03/15/23 1214   Wound Odor None 03/15/23 1214   Donna-Wound/Incision Assessment Intact 03/15/23 1214   Edges Flat/open edges 03/15/23 1214   Wound Thickness Description Partial thickness 03/15/23 1214   Number of days: 0         Recommend:    Right elbow abrasion: BID apply Venelex ointment to wound and leave [facility].     Roxanne Ball RN, [employer]

## 2023-03-15 NOTE — PROGRESS NOTES
Spiritual Care Assessment/Progress Note  [facility]      NAME: Jassi Boyer      MRN: [id number]  AGE: 69 y.o.  SEX: female  [place]din Affiliation: Cici   Language: English     3/15/2023     Total Time (in minutes): 10     Spiritual Assessment begun in [facility] through conversation with:         [x]Patient        [] Family    [] Friend(s)        Reason for Consult: Initial/Spiritual assessment, patient floor     Spiritual beliefs: (Please include comment if needed)     [] Identifies with a cody tradition:         [] Supported by a cody community:            [] Claims no spiritual orientation:           [] Seeking spiritual identity:                [] Adheres to an individual form of spirituality:           [x] Not able to assess:                           Identified resources for coping:      [] Prayer                               [] Music                  [] Guided Imagery     [] Family/friends                 [] Pet visits     [] Devotional reading                         [] Unknown     [x] Other:  Declined visit but requested to complete an AMD before discharge (likely tomorrow afternoon)                                             Interventions offered during this visit: (See comments for more details)    Patient Interventions: Advance medical directive consult, Other (comment) (Patient requested to complete an AMD before leaving tomorrow)           Plan of Care:     [] Support spiritual and/or cultural needs    [] Support AMD and/or advance care planning process      [] Support grieving process   [] Coordinate Rites and/or Rituals    [] Coordination with community clergy   [] No spiritual needs identified at this time   [] Detailed Plan of Care below (See Comments)  [] Make referral to Music Therapy  [] Make referral to Pet Therapy     [] Make referral to Addiction services  [] Make referral to [facility]  [] Make referral to Spiritual Care Partner  [] No future visits requested        [] Contact Spiritual Care for further referrals     Comments:   Knocked on door and asked if Patient would like a  visit. She replies no, but did ask to complete an AMD. I shared that I am unable to complete that with her, but that I could pass the information along to the [address] [place] [address] office for a full-time  to help her with it. She thanked me. [address].   CARMELITA Stallworth

## 2023-03-15 NOTE — PROGRESS NOTES
Pod1 IMN for hip fracture  Awake and alert today  Pain well controlled  Hgb 9.9    Patient Vitals for the past 24 hrs:   Temp Pulse Resp BP SpO2   03/15/23 1134 98.2 °F (36.8 °C) 80 16 (!) 126/57 100 %   03/15/23 0828 98.1 °F (36.7 °C) 86 16 (!) 114/47 97 %   03/15/23 0310 98.2 °F (36.8 °C) 71 18 (!) 113/52 100 %   03/14/23 2325 -- 87 16 (!) 117/59 98 %   03/14/23 1936 98.1 °F (36.7 °C) 77 16 (!) 122/54 99 %   03/14/23 1817 98 °F (36.7 °C) 71 16 132/67 99 %   03/14/23 1704 98.2 °F (36.8 °C) 90 16 (!) 119/99 100 %   03/14/23 1604 98.2 °F (36.8 °C) 61 16 (!) 155/64 99 %   03/14/23 1545 98 °F (36.7 °C) 61 12 (!) 132/49 98 %   03/14/23 1530 -- 63 10 133/71 99 %   03/14/23 1525 -- 70 14 (!) 145/55 99 %   03/14/23 1520 -- 75 12 (!) 146/61 98 %   03/14/23 1515 -- 78 14 (!) 153/68 100 %   03/14/23 1514 98.1 °F (36.7 °C) 78 10 138/77 99 %   03/14/23 1234 98.3 °F (36.8 °C) 84 17 (!) 153/72 95 %     Dressing clean and dry  Musculoskeletal: Chucky's sign negative in bilateral lower extremities. Calves soft, supple, non-tender upon palpation or with passive stretch.        Oob with PT  Wbat  Dc plan snf v. HH  Lovenox for dvt

## 2023-03-15 NOTE — PROGRESS NOTES
Problem: Mobility Impaired (Adult and Pediatric)  Goal: *Acute Goals and Plan of Care (Insert Text)  Description: FUNCTIONAL STATUS PRIOR TO ADMISSION: Patient was modified independent using a single point cane for functional mobility. HOME SUPPORT PRIOR TO ADMISSION: The patient lived alone with no local support. Physical Therapy Goals  Initiated 3/15/2023  1. Patient will move from supine to sit and sit to supine  in bed with moderate assistance  within 7 day(s). 2.  Patient will transfer from bed to chair and chair to bed with contact guard assist using the least restrictive device within 7 day(s). 3.  Patient will perform sit to stand with minimal assistance within 7 day(s). 4.  Patient will ambulate with modified independence for 15 feet with the least restrictive device within 7 day(s). Outcome: Progressing Towards Goal   PHYSICAL THERAPY EVALUATION  Patient: Jaquelin Riddle (29 y.o. female)  Date: 3/15/2023  Primary Diagnosis: Closed right hip fracture (HCC) [S72.001A]  Procedure(s) (LRB):  RIGHT FEMORAL INTERTROCHANTERIC NAIL INSERTION (Right) 1 Day Post-Op   Precautions:   Fall, WBAT    ASSESSMENT  Based on the objective data described below, the patient presents with decreased independence with functional mobility, and decreased strength/endurance post-op day 1 R IM nail. She demonstrates the ability to transition supine to sit with HOB elevated. Patient is able to bring the R LE to the EOB but requires Mod A to elevate and support her trunk. Once feet are flat patient demonstrates good independent sitting balance. She stands with [place] [facility] [place] height is elevated. She requires increased time in order to pivot to bedside chair. VC are provided for weight shifting. Patient requires Mod A to lower to sit due to decreased eccentric control. Post transfer RN arrives to report patient demonstrates elevated HR.  She also demonstrates increased work of breathing but sats are stable at 100%. Patient sits in bedside chair for about 3 minutes before being assisted back to bed. She requires Min A and use of momentum to achieve standing. Patient is provided assistance with LE in order to return to supine. Current Level of Function Impacting Discharge (mobility/balance): Mod A bed mobility, Min- Mod A sit<>stand     Functional Outcome Measure: The patient scored 10/24 on the [facility] outcome measure. Other factors to consider for discharge: medical stability, decreased strength/endurance, increased need for assistance     Patient will benefit from skilled therapy intervention to address the above noted impairments. PLAN :  Recommendations and Planned Interventions: bed mobility training, transfer training, gait training, therapeutic exercises, neuromuscular re-education, edema management/control, patient and family training/education, and therapeutic activities      Frequency/Duration: Patient will be followed by physical therapy:  twice daily to address goals. Recommendation for discharge: (in order for the patient to meet his/her long term goals)  Therapy up to 5 days/week in SNF setting    This discharge recommendation:  Has not yet been discussed the attending provider and/or case management    IF patient discharges home will need the following DME: to be determined (TBD)         SUBJECTIVE:   Patient stated I want to get up again later today.     OBJECTIVE DATA SUMMARY:   HISTORY:    Past Medical History:   Diagnosis Date    Diabetes ([address]     High cholesterol     HTN (hypertension)     Osteoporosis      Past Surgical History:   Procedure Laterality Date    HX ORTHOPAEDIC Left     REVERSE SHOULDER    HX ORTHOPAEDIC Left     ORIF LEFT HUMEROUS    HX ORTHOPAEDIC Left     ORIF LEFT ELBOW WITH HARDWARE REMOVAL    HX ORTHOPAEDIC      BACK - SCREWS PLACED.     HX TONSILLECTOMY      HX WISDOM TEETH EXTRACTION         Personal factors and/or comorbidities impacting plan of care:     Home Situation  Home Environment: Private residence  # Steps to Enter: 0  One/Two Story Residence: One story  Living Alone: Yes  Support Systems: Other Family Member(s)  Patient Expects to be Discharged to[de-identified] Rehab Unit Subacute  Current DME Used/Available at Home: None    EXAMINATION/PRESENTATION/DECISION MAKING:   Critical Behavior:  Neurologic State: Alert  Orientation Level: Oriented X4  Cognition: Appropriate safety awareness, Follows commands     Hearing: Auditory  Auditory Impairment: Hard of hearing, bilateral  Hearing Aids/Status: Does not own  Skin:    Edema:   Range Of Motion:  AROM: Generally decreased, functional           PROM: Generally decreased, functional           Strength:    Strength: Generally decreased, functional                    Tone & Sensation:   Tone: Normal              Sensation: Intact               Coordination:  Coordination: Generally decreased, functional  Vision:      Functional Mobility:  Bed Mobility:     Supine to Sit: Minimum assistance;Bed Modified  Sit to Supine: Moderate assistance  Scooting: Maximum assistance  Transfers:  Sit to Stand: Minimum assistance  Stand to Sit: Moderate assistance        Bed to Chair: Minimum assistance              Balance:   Sitting: With support; Intact  Sitting - Static: Good (unsupported)  Sitting - Dynamic: Fair (occasional)  Standing: Impaired; With support  Standing - Static: Constant support; Fair  Standing - Dynamic : Constant support; Fair  Ambulation/Gait Training:                                                         Stairs: Therapeutic Exercises:       Functional Measure:  Melony Jon AM-PAC®      Basic Mobility Inpatient Short Form (6-Clicks) Version 2  How much HELP from another person do you currently need. .. (If the patient hasn't done an activity recently, how much help from another person do you think they would need if they tried?) Total A Lot A Little None   1.   Turning from your back to your side while in a flat bed without using bedrails? []  1 [x]  2 []  3  []  4   2. Moving from lying on your back to sitting on the side of a flat bed without using bedrails? []  1 [x]  2 []  3  []  4   3. Moving to and from a bed to a chair (including a wheelchair)? []  1 [x]  2 []  3  []  4   4. Standing up from a chair using your arms (e.g. wheelchair or bedside chair)? []  1 [x]  2 []  3  []  4   5. Walking in hospital room? [x]  1 []  2 []  3  []  4   6. Climbing 3-5 steps with a railing? [x]  1 []  2 []  3  []  4     Raw Score: 10/24                            Cutoff score ?171,2,3 had higher odds of discharging home with home health or need of SNF/IPR. 1509 Gaston Mendoza, Devendra Estevez. Validity of the AM-PAC 6-Clicks Inpatient Daily Activity and Basic Mobility Short Forms. Physical Therapy Mar 2014, [phone]; DOI: 10.2522/ptj.72249905  2. Mariza Haro. Association of AM-PAC \"6-Clicks\" Basic Mobility and Daily Activity Scores With Discharge Destination. Phys Ther. 2021 Apr 4;101(4):cyic164. doi: 10.1093/ptj/djdf741. PMID: 44658160. V Ju Ni, Yuri D, Zach Winn, Noelle S. Activity Measure for Post-Acute Care \"6-Clicks\" Basic Mobility Scores Predict Discharge Destination After Acute Care Hospitalization in Select Patient Groups: A Retrospective, Observational Study. Arch Rehabil Res Clin Transl. 2022 Jul 16;4(3):589006. doi: 10.1016/j.arrct. 2756.251254. PMID: 06271530; PMCID: [id number]. 4. Nahun Fagan, Emelyn Mcknight. AM-PAC Short Forms Manual 4.0. Revised 2/2020.        Physical Therapy Evaluation Charge Determination   History Examination Presentation Decision-Making   HIGH Complexity :3+ comorbidities / personal factors will impact the outcome/ POC  LOW Complexity : 1-2 Standardized tests and measures addressing body structure, function, activity limitation and / or participation in recreation  MEDIUM Complexity : Evolving with changing characteristics  Other outcome measures [facility]  HIGH       Based on the above components, the patient evaluation is determined to be of the following complexity level: HIGH     Pain Rating:      Activity Tolerance:   Fair    After treatment patient left in no apparent distress:   Supine in bed, Call bell within reach, and Side rails x 3    COMMUNICATION/EDUCATION:   The patients plan of care was discussed with: Occupational therapist and Registered nurse. Fall prevention education was provided and the patient/caregiver indicated understanding., Patient/family have participated as able in goal setting and plan of care. , and Patient/family agree to work toward stated goals and plan of care.     Thank you for this referral.  Alex Haile, PT   Time Calculation: 33 mins

## 2023-03-15 NOTE — PROGRESS NOTES
Comprehensive Nutrition Assessment    Type and Reason for Visit: Initial, Consult    Nutrition Recommendations/Plan:   Continue regular diet  Ensure high protein daily      Malnutrition Assessment:  Malnutrition Status:  No malnutrition (03/15/23 1242)      Nutrition Assessment:    Patient medically noted for right hip fracture. PMH HTN and DM. Receiving a regular diet. She reports a good appetite and eating well; no recent weight changes. Drinks Glucerna shakes daily at home. Agreeable to ensure high protein daily while inpatient. Menu provided. Encouraged intake of meals. No nutrition questions or concerns reported. Nutrition Related Findings:    [phone]   BM 3/12   Os-danii, Cymbalta, Lisinopril, Miralax, Rosuvastatin, pericolace   Wound Type: Surgical incision    Current Nutrition Intake & Therapies:  Average Meal Intake: 51-75%  Average Supplement Intake: 51-75%  ADULT DIET Regular  ADULT ORAL NUTRITION SUPPLEMENT Breakfast; Low Calorie/High Protein    Anthropometric Measures:  Height: 5' 7\" (170.2 cm)  Ideal Body Weight (IBW): 135 lbs (61 kg)     Current Body Wt:  63.5 kg (139 lb 15.9 oz), 103.7 % IBW. Current BMI (kg/m2): 21.9                          BMI Category: Normal weight (BMI 18.5-24. 9)    Estimated Daily Nutrient Needs:  Energy Requirements Based On: Formula  Weight Used for Energy Requirements: Current  Energy (kcal/day): 1505 kcals (BMR x 1. 3AF)  Weight Used for Protein Requirements: Current  Protein (g/day): 64-76g (1.0-1.2 g/kg bw)  Method Used for Fluid Requirements: 1 ml/kcal  Fluid (ml/day): 1500 mL    Nutrition Diagnosis:   No nutrition diagnosis at this time       Nutrition Interventions:   Food and/or Nutrient Delivery: Continue current diet  Nutrition Education/Counseling: No recommendations at this time  Coordination of Nutrition Care: Continue to monitor while inpatient       Goals:     Goals: PO intake 75% or greater, by next RD assessment       Nutrition Monitoring and Evaluation:   Behavioral-Environmental Outcomes: None identified  Food/Nutrient Intake Outcomes: Food and nutrient intake, Supplement intake  Physical Signs/Symptoms Outcomes: Biochemical data, Weight    Discharge Planning:    Continue current diet, Continue oral nutrition supplement    Stephanie Guan RD  Contact: ext [phone]

## 2023-03-15 NOTE — PROGRESS NOTES
Hospitalist Progress Note    NAME: Nancy Patel   :  1945   MRN:  [id number]       Assessment / Plan:  Right intertrochanteric fracture  S/p mechanical fall:  Pain control  Status post righti/cnail insert  Leukocytosis likey reactive, resolved now  Weightbearing as tolerated  Lovenox for DVT prophylaxis  PT OT dyspnea  Elevated CK:  Mild elevation to 600 trending down  Status post IV fluid    Anemia hemoglobin slightly trending down  May be dilutional component  Monitor hemoglobin no active bleeding reported     HTN:  C/w ACE (ramipril switched to lisinopril)     History of DM: Was on metformin in the past. During her last hospitalization, Hba1c was 4 and medications was stopped. Osteopenia:  Takes alendronate weekly     ETOH use:  Drinks 2 glassess of wine daily     Hyperlipidemia:  Cw statin      18.5 - 24.9 Normal weight / Body mass index is 21.93 kg/m². Estimated discharge date:   Barriers: Ortho clearance    Code status: Full  Prophylaxis: To be started after surgery  Recommended Disposition:  TBD     Subjective:     Chief Complaint / Reason for Physician Visit  \"Patient seen today, denies any pain at rest, pain with movement discussed with RN events overnight. Review of Systems:  Symptom Y/N Comments  Symptom Y/N Comments   Fever/Chills    Chest Pain     Poor Appetite    Edema     Cough    Abdominal Pain     Sputum    Joint Pain     SOB/COULTER    Pruritis/Rash     Nausea/vomit    Tolerating PT/OT     Diarrhea    Tolerating Diet     Constipation    Other       Could NOT obtain due to:      Objective:     VITALS:   Last 24hrs VS reviewed since prior progress note.  Most recent are:  Patient Vitals for the past 24 hrs:   Temp Pulse Resp BP SpO2   03/15/23 1550 97.3 °F (36.3 °C) 82 18 133/63 98 %   03/15/23 1134 98.2 °F (36.8 °C) 80 16 (!) 126/57 100 %   03/15/23 0828 98.1 °F (36.7 °C) 86 16 (!) 114/47 97 %   03/15/23 0310 98.2 °F (36.8 °C) 71 18 (!) 113/52 100 %   23 2325 -- 87 16 (!) 117/59 98 %   03/14/23 1936 98.1 °F (36.7 °C) 77 16 (!) 122/54 99 %   03/14/23 1817 98 °F (36.7 °C) 71 16 132/67 99 %         Intake/Output Summary (Last 24 hours) at 3/15/2023 1752  Last data filed at 3/15/2023 0630  Gross per 24 hour   Intake 1000 ml   Output 600 ml   Net 400 ml          I had a face to face encounter and independently examined this patient on 3/15/2023, as outlined below:  PHYSICAL EXAM:  General: WD, WN. Alert, cooperative, no acute distress    EENT:  EOMI. Anicteric sclerae. MMM  Resp:  CTA bilaterally, no wheezing or rales. No accessory muscle use  CV:  Regular  rhythm,  No edema  GI:  Soft, Non distended, Non tender. +Bowel sounds  Neurologic:  Alert and oriented X 3, normal speech,   Ext                   right hip tenderness  Psych:   Good insight. Not anxious nor agitated  Skin:  No rashes. No jaundice    Reviewed most current lab test results and cultures  YES  Reviewed most current radiology test results   YES  Review and summation of old records today    NO  Reviewed patient's current orders and MAR    YES  PMH/ reviewed - no change compared to H&P  ________________________________________________________________________  Care Plan discussed with:    Comments   Patient x    Family      RN     Care Manager x    Consultant                        Multidiciplinary team rounds were held today with , nursing, pharmacist and clinical coordinator. Patient's plan of care was discussed; medications were reviewed and discharge planning was addressed.      ________________________________________________________________________  Total NON critical care TIME:  35   Minutes    Total CRITICAL CARE TIME Spent:   Minutes non procedure based      Comments   >50% of visit spent in counseling and coordination of care     ________________________________________________________________________  Ramey Eduar, MD     Procedures: see electronic medical records for all procedures/Xrays and details which were not copied into this note but were reviewed prior to creation of Plan. LABS:  I reviewed today's most current labs and imaging studies.   Pertinent labs include:  Recent Labs     03/15/23  0640 03/14/23  0340 03/13/23  1010   WBC  --  7.4 13.2*   HGB 9.9* 10.6* 12.4   HCT  --  31.7* 36.7   PLT  --  175 234       Recent Labs     03/15/23  0640 03/14/23  0340 03/13/23  1011 03/13/23  1010    136  --  142   K 4.2 3.4*  --  3.9   * 108  --  108   CO2 24 24  --  25   * 120*  --  158*   BUN 12 17  --  19   CREA 0.60 0.63  --  0.84   CA 7.9* 8.0*  --  8.8   ALB  --   --   --  3.6   TBILI  --   --   --  0.6   ALT  --   --   --  42   INR  --   --  0.9  --          Signed: Aylin Jang MD

## 2023-03-15 NOTE — PROGRESS NOTES
Problem: Self Care Deficits Care Plan (Adult)  Goal: *Acute Goals and Plan of Care (Insert Text)  Description: FUNCTIONAL STATUS PRIOR TO ADMISSION: pt was independent in adls and IADLs, living alone with her cats, driving at baseline. Pt is a retired nurse. HOME SUPPORT: The patient lived alone     Occupational Therapy Goals  Initiated 3/15/2023  1. Patient will perform grooming in standing with minimal assistance/contact guard assist within 7 day(s). 2.  Patient will perform bathing with minimal assistance/contact guard assist, using AE/DME prn within 7 day(s). 3.  Patient will perform lower body adls with minimal assistance/contact guard assist within 7 day(s). 4.  Patient will walk into the bathroom to perform toilet transfers with contact guard assist, using best equipment, within 7 day(s). 5.  Patient will perform all aspects of toileting with supervision/set-up within 7 day(s). 6.  Patient will participate in upper extremity therapeutic exercise/activities with supervision/set-up for 8 minutes within 7 day(s). 7.  Patient will utilize energy conservation techniques during functional activities with verbal cues within 8 day(s). Outcome: Not Met   OCCUPATIONAL THERAPY EVALUATION  Patient: Barbara Arevalo (80 y.o. female)  Date: 3/15/2023  Primary Diagnosis: Closed right hip fracture (HCC) [S72.001A]  Procedure(s) (LRB):  RIGHT FEMORAL INTERTROCHANTERIC NAIL INSERTION (Right) 1 Day Post-Op   Precautions:   Fall, WBAT    ASSESSMENT  Based on the objective data described below, the patient presents with pain, decreased balance, generalized weakness, and decreased functional tolerance impairing independence and safety following R hip  surgery on POD 1 due to fracture post fall. Pt is functioning well below her independent baseline and will benefit from acute OT services. At discharge she will benefit from rehab.       Current Level of Function Impacting Discharge (ADLs/self-care): up to max A for adls.  Assist X1 to 2 using RW for functional transfer with min/mod Assist    Functional Outcome Measure: The patient scored Total: 55/100 on the Barthel Index outcome measure which is indicative of being partially dependent in basic self-care. Other factors to consider for discharge: pt lives alone at baseline. Has had LUE surgeries      Patient will benefit from skilled therapy intervention to address the above noted impairments. PLAN :  Recommendations and Planned Interventions: self care training, functional mobility training, therapeutic exercise, balance training, therapeutic activities, endurance activities, patient education, home safety training, and family training/education    Frequency/Duration: Patient will be followed by occupational therapy 5 times a week to address goals. Recommendation for discharge: (in order for the patient to meet his/her long term goals)  To be determined: intensive rehab    This discharge recommendation:  Has not yet been discussed the attending provider and/or case management    IF patient discharges home will need the following DME: to be determined by rehab facility       SUBJECTIVE:   Patient was cooperative and engaged in tx session. OBJECTIVE DATA SUMMARY:   HISTORY:   Past Medical History:   Diagnosis Date    Diabetes ([address]     High cholesterol     HTN (hypertension)     Osteoporosis      Past Surgical History:   Procedure Laterality Date    HX ORTHOPAEDIC Left     REVERSE SHOULDER    HX ORTHOPAEDIC Left     ORIF LEFT HUMEROUS    HX ORTHOPAEDIC Left     ORIF LEFT ELBOW WITH HARDWARE REMOVAL    HX ORTHOPAEDIC      BACK - SCREWS PLACED.     HX TONSILLECTOMY      HX WISDOM TEETH EXTRACTION         Expanded or extensive additional review of patient history:     Home Situation  Home Environment: Private residence  # Steps to Enter: 0  One/Two Story Residence: One story  Living Alone: Yes  Support Systems: Other Family Member(s)  Patient Expects to be Discharged to[de-identified] Rehab Unit Subacute  Current DME Used/Available at Home: None    Hand dominance: Right    EXAMINATION OF PERFORMANCE DEFICITS:  Cognitive/Behavioral Status:  Neurologic State: Alert  Orientation Level: Appropriate for age  Cognition: Appropriate decision making; Appropriate for age attention/concentration; Appropriate safety awareness; Follows commands  Perception: Appears intact  Perseveration: No perseveration noted  Safety/Judgement: Fall prevention; Awareness of environment    Skin: generally intact, noted bruising from fall, skin is fragile appearing    Edema: expected post surgery    Hearing: Auditory  Auditory Impairment: Hard of hearing, bilateral  Hearing Aids/Status: Does not own    Vision/Perceptual:                                     Range of Motion:  BUEs:    AROM: Generally decreased, functional--decreased LUE at baseline  PROM: Generally decreased, functional                      Strength:  BUEs:    Strength: Generally decreased, functional                Coordination:  Coordination: Generally decreased, functional  Fine Motor Skills-Upper: Left Intact; Right Intact    Gross Motor Skills-Upper: Left Impaired;Right Intact    Tone & Sensation:    Tone: Normal  Sensation: Intact                      Balance:  Sitting: Intact; With support  Sitting - Static: Good (unsupported)  Sitting - Dynamic: Fair (occasional)  Standing: Impaired; With support  Standing - Static: Constant support; Fair  Standing - Dynamic : Constant support; Fair    Functional Mobility and Transfers for ADLs:  Bed Mobility:  Supine to Sit: Minimum assistance  Sit to Supine: Moderate assistance  Scooting: Maximum assistance    Transfers:  Sit to Stand: Minimum assistance  Stand to Sit: Moderate assistance  Bed to Chair: Minimum assistance  Toilet Transfer :  Moderate assistance (using BSC placed close to bed to minimize steps for transfer)  Assistive Device : Walker    ADL Assessment:  Feeding: Independent    Oral Facial Hygiene/Grooming: Setup    Bathing: Maximum assistance         Upper Body Dressing: Minimum assistance    Lower Body Dressing: Maximum assistance    Toileting: Contact guard assistance;Setup (using BSC)                ADL Intervention and task modifications:     Pt was on BSC upon arrival and assisted in returning to bed level as nursing was preparing to perform bladder scan. Pt performed transfer using RW, requiring assistance with BLEs from sit to supine. Cognitive Retraining  Safety/Judgement: Fall prevention; Awareness of environment       Functional Measure:    Barthel Index:  Bathin  Bladder: 10  Bowels: 10  Groomin  Dressin  Feeding: 10  Mobility: 0  Stairs: 0  Toilet Use: 5  Transfer (Bed to Chair and Back): 10  Total: 55/100      The Barthel ADL Index: Guidelines  1. The index should be used as a record of what a patient does, not as a record of what a patient could do. 2. The main aim is to establish degree of independence from any help, physical or verbal, however minor and for whatever reason. 3. The need for supervision renders the patient not independent. 4. A patient's performance should be established using the best available evidence. Asking the patient, friends/relatives and nurses are the usual sources, but direct observation and common sense are also important. However direct testing is not needed. 5. Usually the patient's performance over the preceding 24-48 hours is important, but occasionally longer periods will be relevant. 6. Middle categories imply that the patient supplies over 50 per cent of the effort. 7. Use of aids to be independent is allowed. Score Interpretation (from [facility])    Independent   60-79 Minimally independent   40-59 Partially dependent   20-39 Very dependent   <20 Totally dependent     -Judith Petersen., Barthel, D.W. (1965). Functional evaluation: the Barthel Index.  [address] [facility] (Dharmesh Man., [address] (1997). The Barthel activities of daily living index: self-reporting versus actual performance in the old (> or = 75 years). Journal [address] 45(5), [facility], RICHIE, Donovan Langford., Erika Piedra. (1999). Measuring the change in disability after inpatient rehabilitation; comparison of the responsiveness of the Barthel Index and Functional [place] Measure. Journal of Neurology, Neurosurgery, and Psychiatry, 66(4), 005-665. WARREN Santos, LUIS MIGUEL Camilo, & Rahul Piña M.A. (2004) Assessment of post-stroke quality of life in cost-effectiveness studies: The usefulness of the Barthel Index and the EuroQoL-5D. Quality of Life Research, 15, 598-95     Occupational Therapy Evaluation Charge Determination   History Examination Decision-Making   MEDIUM Complexity : Expanded review of history including physical, cognitive and psychosocial  history  MEDIUM Complexity : 3-5 performance deficits relating to physical, cognitive , or psychosocial skils that result in activity limitations and / or participation restrictions MEDIUM Complexity : Patient may present with comorbidities that affect occupational performnce. Miniml to moderate modification of tasks or assistance (eg, physical or verbal ) with assesment(s) is necessary to enable patient to complete evaluation       Based on the above components, the patient evaluation is determined to be of the following complexity level: MEDIUM  Pain Rating:  Did not rate pain    Activity Tolerance:   Fair    After treatment patient left in no apparent distress:    Supine in bed, Call bell within reach, Side rails x 3, and nursing present to perform test    COMMUNICATION/EDUCATION:   The patients plan of care was discussed with: Physical therapist and Registered nurse. Patient/family have participated as able in goal setting and plan of care.     This patients plan of care is not appropriate for delegation to [facility].     Thank you for this referral.  Noland Baumgarten, OTR/L  Time Calculation: 10 mins

## 2023-03-15 NOTE — PROGRESS NOTES
Problem: Mobility Impaired (Adult and Pediatric)  Goal: *Acute Goals and Plan of Care (Insert Text)  Description: FUNCTIONAL STATUS PRIOR TO ADMISSION: Patient was modified independent using a single point cane for functional mobility. HOME SUPPORT PRIOR TO ADMISSION: The patient lived alone with no local support. Physical Therapy Goals  Initiated 3/15/2023  1. Patient will move from supine to sit and sit to supine  in bed with moderate assistance  within 7 day(s). 2.  Patient will transfer from bed to chair and chair to bed with contact guard assist using the least restrictive device within 7 day(s). 3.  Patient will perform sit to stand with minimal assistance within 7 day(s). 4.  Patient will ambulate with modified independence for 15 feet with the least restrictive device within 7 day(s). 3/15/2023 1513 by Aldo Sandoval PT  Outcome: Progressing Towards Goal   PHYSICAL THERAPY TREATMENT  Patient: Jasvir Andino (39 y.o. female)  Date: 3/15/2023  Diagnosis: Closed right hip fracture ([address] [S72.001A] <principal problem not specified>  Procedure(s) (LRB):  RIGHT FEMORAL INTERTROCHANTERIC NAIL INSERTION (Right) 1 Day Post-Op  Precautions: Fall, WBAT  Chart, physical therapy assessment, plan of care and goals were reviewed. ASSESSMENT  Patient continues with skilled PT services and is progressing towards goals. She is able to roll R and L with use of bed rails to assist with donning a brief. She demonstrates the need for decreased assistance for supine to sit provided [facility] is elevated. Patient is able to elevate her trunk this PM but continues to require Max A to scoot forward to EOB. Patient demonstrates the need for Min A for sit to stand provided bed height is elevated. She ambulates with shortened bilateral step length and decreased R LE weight bearing. She tolerates sitting for 3-5 minutes before being assisted to bedside commode. VC are provided to scoot forward surface to stand.  She is able to ambulate increased distance session. Current Level of Function Impacting Discharge (mobility/balance): Min A sit to stand, Mod A stand to sit, Mod A bed mobility     Other factors to consider for discharge:          PLAN :  Patient continues to benefit from skilled intervention to address the above impairments. Continue treatment per established plan of care. to address goals. Recommendation for discharge: (in order for the patient to meet his/her long term goals)  Therapy up to 5 days/week in SNF setting    This discharge recommendation:  Has been made in collaboration with the attending provider and/or case management    IF patient discharges home will need the following DME: to be determined (TBD)       SUBJECTIVE:   Patient stated I don't want to get a blood clot.     OBJECTIVE DATA SUMMARY:   Critical Behavior:  Neurologic State: Alert  Orientation Level: Oriented X4  Cognition: Appropriate safety awareness, Follows commands     Functional Mobility Training:  Bed Mobility:     Supine to Sit: Minimum assistance  Sit to Supine: Moderate assistance  Scooting: Maximum assistance        Transfers:  Sit to Stand: Minimum assistance  Stand to Sit: Moderate assistance        Bed to Chair: Minimum assistance                    Balance:  Sitting: Intact; With support  Sitting - Static: Good (unsupported)  Sitting - Dynamic: Fair (occasional)  Standing: Impaired; With support  Standing - Static: Constant support; Fair  Standing - Dynamic : Constant support; Fair  Ambulation/Gait Training:                                                        Stairs: Therapeutic Exercises:     Pain Rating:      Activity Tolerance:   Fair    After treatment patient left in no apparent distress:   Supine in bed, Call bell within reach, and Side rails x 3    COMMUNICATION/COLLABORATION:   The patients plan of care was discussed with: Occupational therapist and Registered nurse.      Fernandez Vazquez, PT   Time Calculation: 33 mins

## 2023-03-16 VITALS
HEART RATE: 87 BPM | BODY MASS INDEX: 21.97 KG/M2 | SYSTOLIC BLOOD PRESSURE: 128 MMHG | TEMPERATURE: 97.3 F | DIASTOLIC BLOOD PRESSURE: 61 MMHG | RESPIRATION RATE: 16 BRPM | OXYGEN SATURATION: 95 % | WEIGHT: 139.99 LBS | HEIGHT: 67 IN

## 2023-03-16 LAB
HGB BLD-MCNC: 9.1 G/DL (ref 11.5–16)
SARS-COV-2 RDRP RESP QL NAA+PROBE: NOT DETECTED
SOURCE, COVRS: NORMAL

## 2023-03-16 PROCEDURE — 74011250636 HC RX REV CODE- 250/636: Performed by: STUDENT IN AN ORGANIZED HEALTH CARE EDUCATION/TRAINING PROGRAM

## 2023-03-16 PROCEDURE — 97530 THERAPEUTIC ACTIVITIES: CPT

## 2023-03-16 PROCEDURE — 87635 SARS-COV-2 COVID-19 AMP PRB: CPT

## 2023-03-16 PROCEDURE — 36415 COLL VENOUS BLD VENIPUNCTURE: CPT

## 2023-03-16 PROCEDURE — 94760 N-INVAS EAR/PLS OXIMETRY 1: CPT

## 2023-03-16 PROCEDURE — 85018 HEMOGLOBIN: CPT

## 2023-03-16 PROCEDURE — 74011000250 HC RX REV CODE- 250: Performed by: STUDENT IN AN ORGANIZED HEALTH CARE EDUCATION/TRAINING PROGRAM

## 2023-03-16 PROCEDURE — 74011250637 HC RX REV CODE- 250/637: Performed by: STUDENT IN AN ORGANIZED HEALTH CARE EDUCATION/TRAINING PROGRAM

## 2023-03-16 PROCEDURE — 74011250637 HC RX REV CODE- 250/637: Performed by: PHYSICIAN ASSISTANT

## 2023-03-16 PROCEDURE — 97116 GAIT TRAINING THERAPY: CPT

## 2023-03-16 RX ORDER — FERROUS SULFATE, DRIED 160(50) MG
1 TABLET, EXTENDED RELEASE ORAL
Qty: 90 TABLET | Refills: 0 | Status: SHIPPED | OUTPATIENT
Start: 2023-03-16

## 2023-03-16 RX ORDER — ENOXAPARIN SODIUM 100 MG/ML
40 INJECTION SUBCUTANEOUS DAILY
Qty: 8.4 ML | Refills: 0 | Status: SHIPPED
Start: 2023-03-17 | End: 2023-04-07

## 2023-03-16 RX ORDER — AMOXICILLIN 250 MG
1 CAPSULE ORAL 2 TIMES DAILY
Qty: 30 TABLET | Refills: 0 | Status: SHIPPED | OUTPATIENT
Start: 2023-03-16

## 2023-03-16 RX ORDER — OXYCODONE HYDROCHLORIDE 10 MG/1
10 TABLET ORAL
Qty: 10 TABLET | Refills: 0 | Status: SHIPPED | OUTPATIENT
Start: 2023-03-16 | End: 2023-03-19

## 2023-03-16 RX ADMIN — LISINOPRIL 5 MG: 10 TABLET ORAL at 09:34

## 2023-03-16 RX ADMIN — OXYCODONE HYDROCHLORIDE 10 MG: 5 TABLET ORAL at 04:39

## 2023-03-16 RX ADMIN — OXYCODONE HYDROCHLORIDE 10 MG: 5 TABLET ORAL at 00:01

## 2023-03-16 RX ADMIN — SENNOSIDES AND DOCUSATE SODIUM 1 TABLET: 50; 8.6 TABLET ORAL at 09:08

## 2023-03-16 RX ADMIN — Medication 1 TABLET: at 12:45

## 2023-03-16 RX ADMIN — ACETAMINOPHEN 325MG 650 MG: 325 TABLET ORAL at 12:44

## 2023-03-16 RX ADMIN — DULOXETINE 30 MG: 30 CAPSULE, DELAYED RELEASE ORAL at 09:08

## 2023-03-16 RX ADMIN — CASTOR OIL AND BALSAM, PERU: 788; 87 OINTMENT TOPICAL at 09:36

## 2023-03-16 RX ADMIN — SODIUM CHLORIDE 75 ML/HR: 9 INJECTION, SOLUTION INTRAVENOUS at 10:53

## 2023-03-16 RX ADMIN — SODIUM CHLORIDE, PRESERVATIVE FREE 5 ML: 5 INJECTION INTRAVENOUS at 06:01

## 2023-03-16 RX ADMIN — ACETAMINOPHEN 325MG 650 MG: 325 TABLET ORAL at 00:01

## 2023-03-16 RX ADMIN — Medication 1 TABLET: at 09:34

## 2023-03-16 RX ADMIN — POLYETHYLENE GLYCOL 3350 17 G: 17 POWDER, FOR SOLUTION ORAL at 09:10

## 2023-03-16 RX ADMIN — ENOXAPARIN SODIUM 40 MG: 100 INJECTION SUBCUTANEOUS at 09:11

## 2023-03-16 RX ADMIN — ROSUVASTATIN CALCIUM 5 MG: 10 TABLET, COATED ORAL at 09:09

## 2023-03-16 NOTE — PROGRESS NOTES
Problem: Mobility Impaired (Adult and Pediatric)  Goal: *Acute Goals and Plan of Care (Insert Text)  Description: FUNCTIONAL STATUS PRIOR TO ADMISSION: Patient was modified independent using a single point cane for functional mobility. HOME SUPPORT PRIOR TO ADMISSION: The patient lived alone with no local support. Physical Therapy Goals  Initiated 3/15/2023  1. Patient will move from supine to sit and sit to supine  in bed with moderate assistance  within 7 day(s). 2.  Patient will transfer from bed to chair and chair to bed with contact guard assist using the least restrictive device within 7 day(s). 3.  Patient will perform sit to stand with minimal assistance within 7 day(s). 4.  Patient will ambulate with modified independence for 15 feet with the least restrictive device within 7 day(s). Outcome: Progressing Towards Goal   PHYSICAL THERAPY TREATMENT  Patient: Italia Quintero (89 y.o. female)  Date: 3/16/2023  Diagnosis: Closed right hip fracture ([facility] [address] [S72.001A] <principal problem not specified>  Procedure(s) (LRB):  RIGHT FEMORAL INTERTROCHANTERIC NAIL INSERTION (Right) 2 Days Post-Op  Precautions: Fall, WBAT  Chart, physical therapy assessment, plan of care and goals were reviewed. ASSESSMENT  Patient continues with skilled PT services and is progressing towards goals. Pt presents with decreased strength and increased pain. Pt performed bed mobility at min A with additional time. Pt performed sit to sntad transfer at min A/CGA with elevated bed. Pt initially dizzy with first stand but better on second stand. Pt ambulated 4ft with RW at min A/CGA with cueing for sequencing. Pt needing to sit after short bout due to pain and fatigue. Pts BP stable after mobility although feeling nauseous. Pt with improved mobility tolerance.       Current Level of Function Impacting Discharge (mobility/balance): bed mobility at min A, sit to stand transfer at min A/CGA, ambulation at min A with RW    Other factors to consider for discharge: pain, decreased strength          PLAN :  Patient continues to benefit from skilled intervention to address the above impairments. Continue treatment per established plan of care. to address goals. Recommendation for discharge: (in order for the patient to meet his/her long term goals)  Therapy up to 5 days/week in SNF setting    This discharge recommendation:  Has been made in collaboration with the attending provider and/or case management    IF patient discharges home will need the following DME: to be determined (TBD)       SUBJECTIVE:   Patient stated  I want to get going.     OBJECTIVE DATA SUMMARY:   Critical Behavior:  Neurologic State: Alert  Orientation Level: Oriented X4  Cognition: Follows commands, Appropriate for age attention/concentration  Safety/Judgement: Fall prevention, Awareness of environment  Functional Mobility Training:  Bed Mobility:     Supine to Sit: Minimum assistance     Scooting: Moderate assistance        Transfers:  Sit to Stand: Minimum assistance  Stand to Sit: Minimum assistance        Bed to Chair: Minimum assistance; Additional time                    Balance:  Sitting: Intact  Sitting - Static: Good (unsupported)  Sitting - Dynamic: Good (unsupported)  Standing: Impaired; With support  Standing - Static: Fair;Constant support  Standing - Dynamic : Poor;Constant support;Good  Ambulation/Gait Training:  Distance (ft): 4 Feet (ft)  Assistive Device: Gait belt;Walker, rolling  Ambulation - Level of Assistance: Minimal assistance; Additional time        Gait Abnormalities: Decreased step clearance; Antalgic; Step to gait        Base of Support: Narrowed     Speed/Genie: Slow;Shuffled  Step Length: Right shortened;Left shortened        Pain Rating:  Pt reported pain with mobility.      Activity Tolerance:   Fair and requires rest breaks    After treatment patient left in no apparent distress:   Sitting in chair and Call bell within reach    COMMUNICATION/COLLABORATION:   The patients plan of care was discussed with: Registered nurse.      Carrie Villa PTA   Time Calculation: 30 mins

## 2023-03-16 NOTE — PROGRESS NOTES
ACP Assessment:      Received request from patient for advance medical directive consult. Upon review of chart and communication with care team, patient's decision making abilities are not in question. Patient was present in the room during visit. Patient's niece, who is listed as an emergency contact, lives in [place]. Ms. Naomi Wright has someone local who she would like to speak to about being her medical decision maker, but has not done so. Explained advance medical directive to patient; she is clear on her end of life wishes and her wish to be considered for organ donation. Documented these wishes on advance medical directive, but she did not select a decision maker at this time. Explained importance of speaking with person she has in mind and updating AMD.  She expressed understanding. Made a copy for her file and returned the original to her. During course of advance care planning conversation it became clear patient wants her code status to be DNR. Explained that DNR is different from advance medical directive. She stated she is ready to go whenever the time comes and would not want any resuscitative efforts. Consulted with her nurse prior to leaving the unit.          NIKITA Nguyen, [facility], Staff [address]    [address] [place] [address] Road Paging Service  [phone])

## 2023-03-16 NOTE — ACP (ADVANCE CARE PLANNING)
Goals of care decision today    - Pt wants to be DNR DNI  -okay for Bipap, antibiotics, blood transfusion  -No dialysis, Peg tube    Pt POA was sister, who recently .  -She is going to talk with her niece john to be her medical POA and do advance directive    15 minutes spent on discussion face to face

## 2023-03-16 NOTE — DISCHARGE SUMMARY
Hospitalist Discharge Summary     Patient ID:  Oriana Hemphill  [id number]  34 y.o.  1945  3/13/2023    PCP on record: Angela Pulido MD    Admit date: 3/13/2023  Discharge date and time: 3/16/2023    DISCHARGE DIAGNOSIS:    Right intertrochanteric fracture  Status post mechanical fall  Hypertension  Diabetes mellitus  Osteopenia  History of alcohol use  Hyperlipidemia    CONSULTATIONS:  IP CONSULT TO ORTHOPEDIC SURGERY  IP CONSULT TO HOSPITALIST    Excerpted HPI from H&P of Isidro Salguero MD:  Oriana Hemphill is a 69 y.o.  f with PMH of HTN, Osteopenia, presented with right hip pain after fall yesterday. She had a fall at her home yesterday as she lost her balance. She denies any lightheadedness, dizziness, LOC, change in her bowel or bladder habits, lower extremity edema or tenderness,     ______________________________________________________________________  DISCHARGE SUMMARY/HOSPITAL COURSE:  for full details see H&P, daily progress notes, labs, consult notes. Patient admitted with a right intertrochanteric fracture status post mechanical fall. Patient received right intertrochanteric nail insertion. Weightbearing as tolerated. .  Recommended SNF. Patient is stable for discharge. Patient also had elevated CK on admission which trended down with IV fluids. Patient hemoglobin slightly trending down baseline around 9. Currently over 9.1. Repeat CBC BMP in 1 week. Right intertrochanteric fracture  S/p mechanical fall:  Pain control  Status post righti/cnail insert  Leukocytosis likey reactive, resolved now  Weightbearing as tolerated  Lovenox for DVT prophylaxis  PT OT dyspnea  Elevated CK:  Mild elevation to 600 trending down  Status post IV fluid     Anemia hemoglobin slightly trending down  May be dilutional component  Monitor hemoglobin no active bleeding reported  CBC in 1 week resume home medication     HTN:  Resume on home medication     History of DM:   Was on metformin in the past. During her last hospitalization, Hba1c was 4 and medications was stopped. Osteopenia:  Takes alendronate weekly     ETOH use:  Drinks 2 glassess of wine daily     Hyperlipidemia:  Cw statin     Goals of care discussion patient DNR/DNI    _______________________________________________________________________  Patient seen and examined by me on discharge day. Pertinent Findings:  Gen:    Not in distress  Chest: Clear lungs  CVS:   Regular rhythm. No edema  Abd:  Soft, not distended, not tender  Neuro:  Alert,   _______________________________________________________________________  DISCHARGE MEDICATIONS:   Current Discharge Medication List        START taking these medications    Details   calcium-vitamin D (OS-BRITTNEY +D3) 500 mg-200 unit per tablet Take 1 Tablet by mouth three (3) times daily (with meals). Qty: 90 Tablet, Refills: 0  Start date: 3/16/2023      oxyCODONE IR (ROXICODONE) 10 mg tab immediate release tablet Take 1 Tablet by mouth every six (6) hours as needed for Pain for up to 3 days. Max Daily Amount: 40 mg.  Qty: 10 Tablet, Refills: 0  Start date: 3/16/2023, End date: 3/19/2023    Associated Diagnoses: Closed fracture of right hip, initial encounter ([facility] [address]      senna-docusate (PERICOLACE) 8.6-50 mg per tablet Take 1 Tablet by mouth two (2) times a day. Qty: 30 Tablet, Refills: 0  Start date: 3/16/2023      enoxaparin (LOVENOX) 40 mg/0.4 mL 0.4 mL by SubCUTAneous route daily for 21 days. Qty: 8.4 mL, Refills: 0  Start date: 3/17/2023, End date: 4/7/2023           CONTINUE these medications which have NOT CHANGED    Details   alendronate (FOSAMAX) 70 mg tablet Take 1 Tablet by mouth every seven (7) days. aspirin delayed-release 81 mg tablet Take 81 mg by mouth daily. DULoxetine (CYMBALTA) 30 mg capsule Take 1 Capsule by mouth daily. ramipriL (ALTACE) 5 mg capsule Take 1 Capsule by mouth. rosuvastatin (CRESTOR) 5 mg tablet Take 1 Tablet by mouth daily. acetaminophen (TYLENOL) 325 mg tablet Take 2 Tabs by mouth every six (6) hours as needed for Pain. Qty: 30 Tab, Refills: 0               Patient Follow Up Instructions:    Activity: Activity as tolerated  Diet: Regular Diet  Wound Care: As directed    Follow-up with 2 weeks in   Follow-up tests/labs CBC BMP in 1 week    Follow-up Information       Follow up With Specialties Details Why Abdon Ashford [address], [address] [place] [address] Drive   [address] [place] [address]  [phone]            ________________________________________________________________    Risk of deterioration: Low    Condition at Discharge:  Stable  __________________________________________________________________    Disposition  SNF/LTC    ____________________________________________________________________    Code Status: DNR/DNI  ___________________________________________________________________      Total time in minutes spent coordinating this discharge (includes going over instructions, follow-up, prescriptions, and preparing report for sign off to her PCP) :  35 minutes    Signed:  Guero Escobedo MD

## 2023-03-16 NOTE — PROGRESS NOTES
Hospital to [address]                                                                        69 y.o.   female    [address]   Room: 118/01    [facility] 1 ORTHOPEDICS  Unit Phone# :  [phone]      [facility]  [address] [address]  Dept: [phone]  Loc: [phone]                    SITUATION     Admitted:  3/13/2023         Attending Provider:  Rosette Houston MD       Consultations:  IP CONSULT TO ORTHOPEDIC SURGERY  IP CONSULT TO HOSPITALIST    PCP:  Rolando Bejarano MD   [phone]    Treatment Team: Attending Provider: Rosette Houston MD; Consulting Provider: Ana Liu MD; Consulting Provider: Netta Correa MD; Utilization Review: Ruth Pulliam RN; Care Manager: Jamilah Varghese Primary Nurse: Chu Hillman RN; Physical Therapy Assistant: Roosevelt Crook; Occupational Therapist: Shayan Terrell OTR/L    Admitting Dx:  Closed right hip fracture ([address] Shantel Phillips       Principal Problem: <principal problem not specified>    2 Days Post-Op of   Procedure(s):  RIGHT FEMORAL INTERTROCHANTERIC NAIL INSERTION   BY: Ana Liu MD             ON: 3/14/2023                  Code Status: DNR                Advance Directives:   Advance Care Planning 3/16/2023   Patient's Healthcare Decision Maker is: Legal [facility] Directive Yes, on file   Patient Would Like to Complete Advance Directive -    (Send w/patient)   Not Received       Isolation:  There are currently no Active Isolations       MDRO: No current active infections    Pain Medications given:  Oxycodone    Last dose: 3/16/2023 at  [phone]    Special Equipment needed: no  Type of equipment:    (Not currently on dialysis)  (Not currently on dialysis)  (Not currently on dialysis)     BACKGROUND     Allergies:   Allergies   Allergen Reactions    Pcn [Penicillins] Hives       Past Medical History: Diagnosis Date    Diabetes ([facility] [address]     High cholesterol     HTN (hypertension)     Osteoporosis        Past Surgical History:   Procedure Laterality Date    HX ORTHOPAEDIC Left     REVERSE SHOULDER    HX ORTHOPAEDIC Left     ORIF LEFT HUMEROUS    HX ORTHOPAEDIC Left     ORIF LEFT ELBOW WITH HARDWARE REMOVAL    HX ORTHOPAEDIC      BACK - SCREWS PLACED. HX TONSILLECTOMY      HX WISDOM TEETH EXTRACTION         Medications Prior to Admission   Medication Sig    alendronate (FOSAMAX) 70 mg tablet Take 1 Tablet by mouth every seven (7) days. aspirin delayed-release 81 mg tablet Take 81 mg by mouth daily. DULoxetine (CYMBALTA) 30 mg capsule Take 1 Capsule by mouth daily. ramipriL (ALTACE) 5 mg capsule Take 1 Capsule by mouth. rosuvastatin (CRESTOR) 5 mg tablet Take 1 Tablet by mouth daily. acetaminophen (TYLENOL) 325 mg tablet Take 2 Tabs by mouth every six (6) hours as needed for Pain. Hard scripts included in transfer packet yes    Vaccinations: There is no immunization history on file for this patient. Readmission Risks:    Known Risks: None        The Charlson [employer] Index tool is an evidenced based tool that has more automatic generated information. The tool looks at many different items such as the age of the patient, how many times they were admitted in the last calendar year, current length of stay in the hospital and their diagnosis. All of these items are pulled automatically from information documented in the chart from various places and will generate a score that predicts whether a patient is at low (less than 13), medium (13-20) or high (21 or greater) risk of being readmitted.         ASSESSMENT                Temp: 97.3 °F (36.3 °C) (03/16/23 1057) Pulse (Heart Rate): 87 (03/16/23 1057)     Resp Rate: 16 (03/16/23 1057)           BP: 128/61 (03/16/23 1057)     O2 Sat (%): 95 % (03/16/23 1057)     Weight: 63.5 kg (139 lb 15.9 oz)    Height: 5' 7\" (170.2 cm) (03/15/23 1240)       If above not within 1 hour of discharge:    BP:_____  P:____  R:____ T:_____ O2 Sat: ___%  O2: ______    Active Orders   Diet    ADULT DIET Regular         Orientation: oriented to time, place, person and situation     Active Behaviors: None                                   Active Lines/Drains:  (Peg Tube / Cabral / CL or S/L?): no    Urinary Status: Voiding, External catheter     Last BM: Last Bowel Movement Date: 03/12/23     Skin Integrity: Abrasion, Incision (comment)             Mobility: Slightly limited   Weight Bearing Status: WBAT (Weight Bearing as Tolerated)                Lab Results   Component Value Date/Time    Glucose 123 (H) 03/15/2023 06:40 AM    Hemoglobin A1c 5.3 03/19/2021 03:36 AM    INR 0.9 03/13/2023 10:11 AM    INR 1.0 03/18/2021 02:34 PM    HGB 9.1 (L) 03/16/2023 04:35 AM    HGB 9.9 (L) 03/15/2023 06:40 AM        RECOMMENDATION     See After Visit Summary (AVS) for:  Discharge instructions  After [address] equipment needed (entered pre-discharge by Care Management)  Medication Reconciliation    Follow up Appointment(s)         Report given/sent by:  Jeanine De La Rosa RN                    Verbal report given to: Kamron Toney LPN           Estimated discharge time:  3/16/2023 at 1 PM

## 2023-03-16 NOTE — DISCHARGE INSTRUCTIONS
Discharge Instructions: How to [address]  Surgery: Hip Fracture Repair  Surgeon:   [unfilled]  Surgery Date:  3/14/2023    To relieve pain:  Use ice/gel packs. Put the ice pack directly over the wound, or anywhere you are hurting or swollen. To control pain and swelling, keep ice on regularly, especially after physical activity. The packs should stay cold for 3-4 hours. When it is not cold anymore, rotate with the packs in the freezer. Elevate your leg. This will also keep swelling down. Rest for at least 20 minutes between activity or exercises. To keep track of your pain medications, write down what you take and when you take it. The last dose of pain medication you got in the hospital was:     Medication    Dose    Date & Time          Choose your medications based on the pain scale below: To keep your pain under control, take Tylenol every 6 hours for 14 days - even if you feel like you dont need it. For mild to moderate pain (1-6 on pain scale), take one pain pill every 3-4 hours as needed. For severe pain (7-10 on pain scale), take two pain pills every 3-4 hours as needed. To prevent nausea, take your pain medications with food. Pain Scale                As your pain lessens:    Slowly start taking less pain medication. You may do this by waiting longer between doses or by taking smaller doses. Stop using the pain medications as soon as you no longer need it, usually in 2-3 weeks. Lovenox  To prevent blood clots, you will will need to take    Lovenox 40 mg  daily for 21 days. It is an injection that you receive in the side of your  stomach. Your nursing will teach you or a caregiver how to     do this before you go home. To prevent stomach upset or bleeding:   Take Pepcid 20 mg twice a day, or a similar home medication, while you are taking a blood thinner. Do not take non-steroidal anti-inflammatory (NSAID) medications (Ibuprofen, Advil, Motrin, Naproxen, etc.)                                                 OPSITE (Honeycomb dressing)    Keep your clear, waterproof dressing in place for 5-7 days after your leave the hospital.     If you are still having drainage, you will need to change your dressing in 5-7 days. You will be given one extra dressing to use at home. If there is no more drainage from the wound, you may leave it open to the air. OPSITE DRESSING INSTRUCTIONS            DO NOTs:  Do not rub your surgical wound  Do not put lotion or oils on your wound. Do not take a tub bath or go swimming until your doctor says it is ok. You may shower with this dressing over your wound. After showering pat the dressing dry. If you have staples a home health nurse will remove them in about 10 days. To increase and promote healing:    Stop Smoking (or at least cut back on       Smoking). Eat a well-balanced diet (high in protein       and vitamin C). If you have a poor appetite, drink Ensure, Glucerna, or CarnationInstant Breakfast for the next 30 days. If you are diabetic, you should control your blood         sugars to prevent infection and help your wound         to heal.      To prevent constipation, stay active & drink plenty of fluid. While using pain medications, you should also take stool softeners and laxatives, such as Pericolace and Miralax. If you are having too many bowel movements, then you may need  to stop taking the laxatives. You should have a bowel movement 3-4 days after surgery and then at least every other day while on pain medication. To improve your recovery, you must be active! WEIGHT BEARING  As Tolerated = You can put as much weight on your leg as you can tolerate while walking.         THERAPY  If you go to a rehab facility, physical therapy (PT) will need to work with you daily, and sometimes twice a day to teach you how to:    Get in and out of the bed  Walk (gait training) and climb stairs  Do exercises and gain strength  Use a walker, crutches or cane    You may also need to have occupational therapy (OT) work with you to help you practice:    Getting on and off the toilet  Self-care (brushing teeth, eating, bathing, etc)    If you go directly home, home health physical therapy will come the day after you leave the hospital.  They will visit about 4 times over the next couple of weeks to teach you how to get out of bed, to safely walk in your home, and to do your exercises. NO DRIVING until your surgeon tells you it is ok. You can return to work when cleared by a physician. Please call your physician immediately if you have:    Constant bleeding from your wound. Increasing redness or swelling around your wound (Some warmth, bruising and swelling is normal). Change in wound drainage (increase in amount, color, or bad smell). Change in mental status (unusual behavior)    Temperature over 101.5 degrees Fahrenheit    Increased pain, swelling, or redness in the calf (back of your lower leg), thigh, ankle or foot. Emergency: CALL 911 if you have:  Shortness of breath  Chest pain when you cough or taking a deep breath    Please call your surgeons office at [place] [phone] for a follow up appointment. You should call as soon as you get home or the next day after discharge. Ask to make an appointment in 2 weeks.

## 2023-03-16 NOTE — PROGRESS NOTES
ORTHO - Progress Note  Post Op day: 2 Days Post-Op    Jose Laguerre     [id number]  female    69 y.o.    1945    Admit date:3/13/2023  Date of Surgery:3/14/2023   Procedures:Procedure(s):RIGHT FEMORAL INTERTROCHANTERIC NAIL INSERTION  Surgeon:Surgeon(s) and Role:   * Holly Sanches MD - Primary        SUBJECTIVE:     Jose Laguerre is a 69 y.o. female working with PT, smiling and chatty. Patient has complaints of appropriate post-op pain, tolerating PO pain medications, PRN OXY  Denies F/C, nausea, vomiting, dizziness, lightheadedness, chest pain, or shortness of breath. OBJECTIVE:       Physical Exam:  General: alert, cooperative, no distress. Gastrointestinal:  non-distended . Cardiovascular: equal pulses in the  lower extremities,  Brisk cap refill in all distal extremities   Genitourinary: Voiding independently   Respiratory: No respiratory distress   Neurological:Neurovascular exam within normal limits. Senstion intact: LE bilat. Motor: + DF/PF/EHL. Musculoskeletal: moderate right thigh swelling. Chucky's sign negative in bilateral lower extremities. Calves soft, supple, non-tender upon palpation or with passive stretch. Dressing/Wound:  Clean, dry and intact. No significant erythema or swelling. Vital Signs:       Patient Vitals for the past 8 hrs:   BP Temp Pulse Resp SpO2   23 1057 128/61 97.3 °F (36.3 °C) 87 16 95 %   23 0933 129/65 -- 77 -- --   23 0733 123/60 97.9 °F (36.6 °C) 77 18 95 %                                          Temp (24hrs), Av.9 °F (36.6 °C), Min:97.3 °F (36.3 °C), Max:98.4 °F (36.9 °C)      Labs:        Recent Labs     23  0435 03/15/23  0640 23  0340   HCT  --   --  31.7*   HGB 9.1*   < > 10.6*    < > = values in this interval not displayed.      PT/OT:              ASSESSMENT / PLAN:   Active Problems:    Closed right hip fracture (HCC) (3/13/2023)       -  Continue PT/OT WBAT  -  DVT prophylaxis- SCD w/ Lovenox  - DC planning - SNF-- DC today    Signed By: Jerardo Fallon, ELMA

## 2023-03-16 NOTE — PROGRESS NOTES
Transition of Care Plan:  RUR: 11%  Disposition: anticipate SNF- [facility] [phone] going to room 42A  If SNF or IPR: Date FOC offered: 3/14/23  Date FOC received: 3/14/23  Date authorization started with reference number: n/a  Date authorization received and expires: n/a  Accepting facility: [facility]  Follow up appointments: ortho  DME needed: n/a to go to rehab  Transportation at Discharge: [address] [place] [address] or means to access home: yes      IM Medicare Letter: provided  Caregiver Contact: patrick Peterson [facility] [phone]  Discharge Caregiver contacted prior to discharge? To be contacted upon pt request  Care Conference needed?: no      Md cleared patient for d/c. [facility] confirmed they have a bed for patient today. [facility] arranged for 1pm. Pt notified. 2nd IM provided, signed, and placed on bedside chart. Transition of Care Plan to SNF/Rehab    SNF/Rehab Transition:  Patient has been accepted to [facility] and meets criteria for admission. Patient will transported by [facility] and expected to leave at 1pm.    Communication to Patient/Family:  Met with patient and she is agreeable to the transition plan. Communication to SNF/Rehab:  Bedside RN, Rosa Lees, has been notified to update the transition plan to the facility and call report (phone number [phone]). Discharge information has been updated on the AVS.     Discharge instructions to be fax'd to facility at [facility] # [phone]). Nursing Please include all hard scripts for controlled substances, med rec and dc summary, and AVS in packet.      Reviewed and confirmed with facility, [facility], can manage the patient care needs for the following:     Christianne Appl with (X) only those applicable:    Medication:  [x]  Medications will be available at the facility  []  IV Antibiotics   [x]  Controlled Substance - hard copy to be sent with patient   []  Weekly Labs   Documents:  [x] Hard RX  [x] MAR  [x] Kardex  [x] AVS  [x]Transfer Summary  [x]Discharge   Equipment:  []  CPAP/BiPAP  []  Wound Vacuum  []  Cabral or Urinary Device  []  PICC/Central Line  []  Nebulizer  []  Ventilator   Treatment:  []Isolation (for MRSA, VRE, etc.)  []Surgical Drain Management  []Tracheostomy Care  []Dressing Changes  []Dialysis with transportation and chair time. []PEG Care  []Oxygen  []Daily Weights for Heart Failure   Dietary:  []Any diet limitations  []Tube Feedings   []Total Parenteral Management (TPN)   Eligible for Medicaid Long Term Services and Supports  Yes:  [] Eligible for medical assistance or will become eligible within 180 days and UAI completed. [] Provider/Patient and/or support system has requested screening. [] UAI copy provided to patient or responsible party. [] UAI unavailable at discharge will send once processed to SNF provider. [] UAI unavailable at discharged mailed to patient  No:   [] Private pay and is not financially eligible for Medicaid within the next 180 days. [] Reside out-of-state.   [] A residents of a state owned/operated facility that is licensed  by [address] or [facility]  [] Enrollment in [facility] hospice services  [] [address]  [] Patient /Family declines to have screening completed or provide financial information for screening     Financial Resources:  Medicaid    [] Initiated and application pending   [] Full coverage     Advanced Care Plan:  []Surrogate Decision Maker of Care  []POA  [x]Communicated Code Status DNR   Other     Kiarra Broody, [place], [address]

## 2023-03-16 NOTE — PROGRESS NOTES
Problem: Pressure Injury - Risk of  Goal: *Prevention of pressure injury  Description: Document Kwabnea Scale and appropriate interventions in the flowsheet. Outcome: Progressing Towards Goal  Note: Pressure Injury Interventions:       Moisture Interventions: Absorbent underpads, Internal/External urinary devices, Limit adult briefs, Minimize layers    Activity Interventions: Increase time out of bed, Pressure redistribution bed/mattress(bed type), PT/OT evaluation    Mobility Interventions: Float heels, HOB 30 degrees or less, Pressure redistribution bed/mattress (bed type), PT/OT evaluation    Nutrition Interventions: Document food/fluid/supplement intake    Friction and Shear Interventions: Apply protective barrier, creams and emollients, HOB 30 degrees or less, Minimize layers                Problem: Patient Education: Go to Patient Education Activity  Goal: Patient/Family Education  Outcome: Progressing Towards Goal     Problem: Falls - Risk of  Goal: *Absence of Falls  Description: Document Jessica Fall Risk and appropriate interventions in the flowsheet.   Outcome: Progressing Towards Goal  Note: Fall Risk Interventions:  Mobility Interventions: Bed/chair exit alarm, Communicate number of staff needed for ambulation/transfer, Patient to call before getting OOB, Utilize walker, cane, or other assistive device, Utilize gait belt for transfers/ambulation         Medication Interventions: Patient to call before getting OOB, Teach patient to arise slowly, Utilize gait belt for transfers/ambulation    Elimination Interventions: Call light in reach, Stay With Me (per policy), Patient to call for help with toileting needs, Toileting schedule/hourly rounds    History of Falls Interventions: Bed/chair exit alarm, Utilize gait belt for transfer/ambulation         Problem: Patient Education: Go to Patient Education Activity  Goal: Patient/Family Education  Outcome: Progressing Towards Goal     Problem: Pain  Goal: *Control of Pain  Outcome: Progressing Towards Goal  Goal: *PALLIATIVE CARE:  Alleviation of Pain  Outcome: Progressing Towards Goal     Problem: Patient Education: Go to Patient Education Activity  Goal: Patient/Family Education  Outcome: Progressing Towards Goal     Problem: Patient Education: Go to Patient Education Activity  Goal: Patient/Family Education  Outcome: Progressing Towards Goal     Problem: Hip Fracture:Day of Admission Pre-op  Goal: Off Pathway (Use only if patient is Off Pathway)  Outcome: Progressing Towards Goal  Goal: Activity/Safety  Outcome: Progressing Towards Goal  Goal: Consults, if ordered  Outcome: Progressing Towards Goal  Goal: Diagnostic Test/Procedures  Outcome: Progressing Towards Goal  Goal: Nutrition/Diet  Outcome: Progressing Towards Goal  Goal: Medications  Outcome: Progressing Towards Goal  Goal: Respiratory  Outcome: Progressing Towards Goal  Goal: Treatments/Interventions/Procedures  Outcome: Progressing Towards Goal  Goal: Psychosocial  Outcome: Progressing Towards Goal  Goal: *Labs/Tests Within Defined Limits in Preparation for Surgery  Outcome: Progressing Towards Goal  Goal: *Optimal pain control at patient's stated goal  Outcome: Progressing Towards Goal  Goal: *Hemodynamically stable  Outcome: Progressing Towards Goal     Problem: Patient Education: Go to Patient Education Activity  Goal: Patient/Family Education  Outcome: Progressing Towards Goal     Problem: Patient Education: Go to Patient Education Activity  Goal: Patient/Family Education  Outcome: Progressing Towards Goal

## 2023-03-16 NOTE — ACP (ADVANCE CARE PLANNING)
Advance Care Planning   Advance Care Planning Inpatient Note  [address]  [facility] Department    Today's Date: 3/16/2023  Unit: [facility] ORTHOPEDICS    Received request from patient for advance medical directive consult. Upon review of chart and communication with care team, patient's decision making abilities are not in question. Patient was present in the room during visit. Goals of ACP Conversation:  Discuss Advance Care planning documents    Health Care Decision Makers:    No healthcare decision makers have been documented. Click here to complete [address] including selection of the Healthcare Decision Maker Relationship (ie \"Primary\")  Summary:  No Decision Maker named by patient at this time    Patient's niece, who is listed as an emergency contact, lives in [place]. Ms. Carlos Eduardo Gooden has someone local who she would like to speak to about being her medical decision maker, but has not done so. Advance Care Planning Documents (Patient Wishes) on file:  Living Will/ Advance Directive  Anatomical Gift/Organ donation     Assessment:      Received request from patient for advance medical directive consult. Upon review of chart and communication with care team, patient's decision making abilities are not in question. Patient was present in the room during visit. Patient's niece, who is listed as an emergency contact, lives in [place]. Ms. Carlos Eduardo Gooden has someone local who she would like to speak to about being her medical decision maker, but has not done so. Explained advance medical directive to patient; she is clear on her end of life wishes and her wish to be considered for organ donation. Documented these wishes on advance medical directive, but she did not select a decision maker at this time. Explained importance of speaking with person she has in mind and updating AMD.  She expressed understanding. Made a copy for her file and returned the original to her. Interventions:  Provided education on documents for clarity and greater understanding  Assisted in the completion of documents according to patient's wishes at this time  Encouraged ongoing ACP conversation with future decision makers and loved ones    Care Preferences Communicated:  No    Outcomes/Plan:  ACP Discussion Completed  New Advance Directive completed  Returned original document(s) to patient, as well as copies for distribution to appointed agents  Copy placed on patient chart    [facility]  on 3/16/2023 at 10:02 AM

## 2023-06-01 NOTE — PROGRESS NOTES
5285: Message sent Sukhdev Mena via [employer] \"Patient is having severe spasms of the back that radiate down both legs. I gave her 4 mg IV morphine at 0409 and she is still in pain and in tears. Can I get an order for Flexeril? \"      4308: Order received for flexeril 10 mg PO 3 times daily as needed    0535: Flexeril given. Discontinue Regimen: Stop all present topicals (Prescription and OTC) Continue Regimen: Benadryl 2 PO QHS-PRN Rash\\nZyrtec or Allegra QAM PRN Rash Render In Strict Bullet Format?: No Detail Level: Zone Initiate Treatment: -Prednisone 20mg 3 PO QD x2 days,  2.5 PO QD x2 days, 2 PO QD x2 days, 1.5 PO QD x2 days, 1 PO QD x2 days, then .5 QD x2 days then STOP.\\n\\n-Cephalexin 500mg TID x7 days (patient unable to tolerate Doxycycline)\\n\\n-Desonide Ointment .05% QD-BID PRN scaling, redness and flaking of the facial skin \\n\\n-Topicort Ointment .25% QD-BID PRN scaling, redness and flaking of the ears and body.  Avoid the face.

## 2024-09-13 ENCOUNTER — ANESTHESIA EVENT (OUTPATIENT)
Facility: HOSPITAL | Age: 79
End: 2024-09-13
Payer: MEDICARE

## 2024-09-13 ENCOUNTER — HOSPITAL ENCOUNTER (OUTPATIENT)
Facility: HOSPITAL | Age: 79
Setting detail: OUTPATIENT SURGERY
Discharge: HOME | End: 2024-09-13
Attending: INTERNAL MEDICINE | Admitting: INTERNAL MEDICINE
Payer: MEDICARE

## 2024-09-13 ENCOUNTER — ANESTHESIA (OUTPATIENT)
Facility: HOSPITAL | Age: 79
End: 2024-09-13
Payer: MEDICARE

## 2024-09-13 VITALS
DIASTOLIC BLOOD PRESSURE: 57 MMHG | SYSTOLIC BLOOD PRESSURE: 118 MMHG | TEMPERATURE: 97.3 F | BODY MASS INDEX: 21.79 KG/M2 | RESPIRATION RATE: 15 BRPM | OXYGEN SATURATION: 96 % | WEIGHT: 138.8 LBS | HEIGHT: 67 IN | HEART RATE: 86 BPM

## 2024-09-13 PROCEDURE — 2580000003 HC RX 258: Performed by: INTERNAL MEDICINE

## 2024-09-13 PROCEDURE — 3700000001 HC ADD 15 MINUTES (ANESTHESIA): Performed by: INTERNAL MEDICINE

## 2024-09-13 PROCEDURE — 6360000002 HC RX W HCPCS: Performed by: NURSE ANESTHETIST, CERTIFIED REGISTERED

## 2024-09-13 PROCEDURE — 2500000003 HC RX 250 WO HCPCS: Performed by: NURSE ANESTHETIST, CERTIFIED REGISTERED

## 2024-09-13 PROCEDURE — 7100000011 HC PHASE II RECOVERY - ADDTL 15 MIN: Performed by: INTERNAL MEDICINE

## 2024-09-13 PROCEDURE — 2709999900 HC NON-CHARGEABLE SUPPLY: Performed by: INTERNAL MEDICINE

## 2024-09-13 PROCEDURE — 3600007512: Performed by: INTERNAL MEDICINE

## 2024-09-13 PROCEDURE — 3600007502: Performed by: INTERNAL MEDICINE

## 2024-09-13 PROCEDURE — 3700000000 HC ANESTHESIA ATTENDED CARE: Performed by: INTERNAL MEDICINE

## 2024-09-13 PROCEDURE — 7100000010 HC PHASE II RECOVERY - FIRST 15 MIN: Performed by: INTERNAL MEDICINE

## 2024-09-13 RX ORDER — SODIUM CHLORIDE 0.9 % (FLUSH) 0.9 %
5-40 SYRINGE (ML) INJECTION EVERY 12 HOURS SCHEDULED
Status: DISCONTINUED | OUTPATIENT
Start: 2024-09-13 | End: 2024-09-13 | Stop reason: HOSPADM

## 2024-09-13 RX ORDER — PHENYLEPHRINE HCL IN 0.9% NACL 0.4MG/10ML
SYRINGE (ML) INTRAVENOUS
Status: DISCONTINUED | OUTPATIENT
Start: 2024-09-13 | End: 2024-09-13 | Stop reason: SDUPTHER

## 2024-09-13 RX ORDER — M-VIT,TX,IRON,MINS/CALC/FOLIC 27MG-0.4MG
1 TABLET ORAL DAILY
COMMUNITY

## 2024-09-13 RX ORDER — SODIUM CHLORIDE 0.9 % (FLUSH) 0.9 %
5-40 SYRINGE (ML) INJECTION PRN
Status: DISCONTINUED | OUTPATIENT
Start: 2024-09-13 | End: 2024-09-13 | Stop reason: HOSPADM

## 2024-09-13 RX ORDER — SODIUM CHLORIDE 9 MG/ML
25 INJECTION, SOLUTION INTRAVENOUS PRN
Status: DISCONTINUED | OUTPATIENT
Start: 2024-09-13 | End: 2024-09-13 | Stop reason: HOSPADM

## 2024-09-13 RX ADMIN — PROPOFOL 40 MG: 10 INJECTION, EMULSION INTRAVENOUS at 13:40

## 2024-09-13 RX ADMIN — Medication 80 MCG: at 13:46

## 2024-09-13 RX ADMIN — LIDOCAINE HYDROCHLORIDE 60 MG: 20 INJECTION, SOLUTION EPIDURAL; INFILTRATION; INTRACAUDAL; PERINEURAL at 13:36

## 2024-09-13 RX ADMIN — Medication 80 MCG: at 13:54

## 2024-09-13 RX ADMIN — PROPOFOL 80 MG: 10 INJECTION, EMULSION INTRAVENOUS at 13:36

## 2024-09-13 RX ADMIN — SODIUM CHLORIDE: 9 INJECTION, SOLUTION INTRAVENOUS at 13:32

## 2024-09-13 RX ADMIN — SODIUM CHLORIDE 25 ML: 9 INJECTION, SOLUTION INTRAVENOUS at 13:30

## 2024-09-13 RX ADMIN — Medication 120 MCG: at 13:42

## 2024-09-13 ASSESSMENT — PAIN - FUNCTIONAL ASSESSMENT: PAIN_FUNCTIONAL_ASSESSMENT: NONE - DENIES PAIN

## (undated) DEVICE — SUTURE VCRL SZ 2-0 L18IN ABSRB UD L26MM CP-2 1/2 CIR REV J762D

## (undated) DEVICE — BLADE ES L4IN INSUL EDGE

## (undated) DEVICE — ENDOSCOPIC KIT COMPLIANCE ENDOKIT

## (undated) DEVICE — SUTURE VCRL SZ 3-0 L27IN ABSRB UD L26MM SH 1/2 CIR J416H

## (undated) DEVICE — SYSTEM SKIN CLSR 22CM DERMBND PRINEO

## (undated) DEVICE — TOOL 14MH30 LEGEND 14CM 3MM: Brand: MIDAS REX ™

## (undated) DEVICE — SUTURE MCRYL SZ 4-0 L27IN ABSRB UD L19MM PS-2 1/2 CIR PRIM Y426H

## (undated) DEVICE — 6619 2 PTNT ISO SYS INCISE AREA&LT;(&GT;&&LT;)&GT;P: Brand: STERI-DRAPE™ IOBAN™ 2

## (undated) DEVICE — COVER,MAYO STAND,STERILE: Brand: MEDLINE

## (undated) DEVICE — SUTURE VCRL SZ 0 L45CM ABSRB VLT OS-6 L36.4MM 1/2 CIR REV J711T

## (undated) DEVICE — CUFF BLD PRSS AD CLTH SGL TB W/ BAYNT CONN ROUNDED CORNER

## (undated) DEVICE — SMOKE EVACUATION PENCIL: Brand: VALLEYLAB

## (undated) DEVICE — BONE TAMP KIT KPX153PB FF X2 15/3 1 STP: Brand: KYPHOPAK™ TRAY

## (undated) DEVICE — STERILE POLYISOPRENE POWDER-FREE SURGICAL GLOVES: Brand: PROTEXIS

## (undated) DEVICE — SOLUTION IRRIG 1000ML STRL H2O USP PLAS POUR BTL

## (undated) DEVICE — SOLUTION IRRIG 1000ML 0.9% SOD CHL USP POUR PLAS BTL

## (undated) DEVICE — Device

## (undated) DEVICE — BONE TAMP KIT KPX153NB AF X2 15/3

## (undated) DEVICE — K-WIRE

## (undated) DEVICE — AEGIS 1" DISK 4MM HOLE, PEEL OPEN: Brand: MEDLINE

## (undated) DEVICE — DRAIN KT WND 10FR RND 400ML --

## (undated) DEVICE — SUTURE VCRL SZ 2-0 L18IN ABSRB VLT L26MM CT-2 1/2 CIR J726D

## (undated) DEVICE — DRESSING WND ISLAND STD 4X10 IN MULT LAYR STRL SILVERLON

## (undated) DEVICE — IV START KIT: Brand: MEDLINE

## (undated) DEVICE — SPONGE GZ W4XL4IN COT 12 PLY TYP VII WVN C FLD DSGN

## (undated) DEVICE — 4-PORT MANIFOLD: Brand: NEPTUNE 2

## (undated) DEVICE — CANISTER, RIGID, 3000CC: Brand: MEDLINE INDUSTRIES, INC.

## (undated) DEVICE — MARKER,SKIN,WI/RULER AND LABELS: Brand: MEDLINE

## (undated) DEVICE — SUTURE VCRL SZ 0 L36IN ABSRB UD L36MM CT-1 1/2 CIR J946H

## (undated) DEVICE — SOL TOP ALC ISO 70% 4OZ --

## (undated) DEVICE — SUTURE VCRL SZ 3-0 L18IN ABSRB UD CP-2 L26MM 1/2 CIR REV J761D

## (undated) DEVICE — STRAP,POSITIONING,KNEE/BODY,FOAM,4X60": Brand: MEDLINE

## (undated) DEVICE — INTENDED FOR TISSUE SEPARATION, AND OTHER PROCEDURES THAT REQUIRE A SHARP SURGICAL BLADE TO PUNCTURE OR CUT.: Brand: BARD-PARKER ® CARBON RIB-BACK BLADES

## (undated) DEVICE — DEVICE TRNSF SPIK STL 2008S] MICROTEK MEDICAL INC]

## (undated) DEVICE — SOL IRR SOD CL 0.9% 1000ML BTL --

## (undated) DEVICE — WILSON FRAME KIT: Brand: MEDLINE INDUSTRIES, INC.

## (undated) DEVICE — FLOSEAL MATRIX IS INDICATED IN SURGICAL PROCEDURES (OTHER THAN IN OPHTHALMIC) AS AN ADJUNCT TO HEMOSTASIS WHEN CONTROL OF BLEEDING BY LIGATURE OR CONVENTIONALPROCEDURES IS INEFFECTIVE OR IMPRACTICAL.: Brand: FLOSEAL HEMOSTATIC MATRIX

## (undated) DEVICE — NEEDLE HYPO 18GA L1.5IN PNK S STL HUB POLYPR SHLD REG BVL

## (undated) DEVICE — CODMAN® INTEGRATED BIPOLAR CORD AND TUBING SET FLYING LEADS, ROTARY PUMP: Brand: CODMAN®

## (undated) DEVICE — INFECTION CONTROL KIT SYS

## (undated) DEVICE — 450 ML BOTTLE OF 0.05% CHLORHEXIDINE GLUCONATE IN 99.95% STERILE WATER FOR IRRIGATION, USP AND APPLICATOR.: Brand: IRRISEPT ANTIMICROBIAL WOUND LAVAGE

## (undated) DEVICE — GLOVE SURG SZ 7 L12IN FNGR THK79MIL GRN LTX FREE

## (undated) DEVICE — KIT POS FOAM HANA TBL

## (undated) DEVICE — LAMINECTOMY RICHMOND-LF: Brand: MEDLINE INDUSTRIES, INC.

## (undated) DEVICE — TUBING, SUCTION, 1/4" X 10', STRAIGHT: Brand: MEDLINE

## (undated) DEVICE — MAJOR LAPAROTOMY-MRMC: Brand: MEDLINE INDUSTRIES, INC.

## (undated) DEVICE — SOLUTION SURG PREP 26 CC PURPREP

## (undated) DEVICE — DRILL, AO SMALL: Brand: GAMMA

## (undated) DEVICE — SUTURE VCRL SZ 2-0 L27IN ABSRB UD L26MM SH 1/2 CIR J417H

## (undated) DEVICE — ELECTRODE PT RET AD L9FT HI MOIST COND ADH HYDRGEL CORDED

## (undated) DEVICE — COVER,TABLE,HEAVY DUTY,77"X90",STRL: Brand: MEDLINE

## (undated) DEVICE — TIP SUCT TRNSPAR RIB SURF STD BLB RIG NVENT W/ 5IN1 CONN DYND50138] MEDLINE INDUSTRIES INC]

## (undated) DEVICE — GARMENT,MEDLINE,DVT,INT,CALF,MED, GEN2: Brand: MEDLINE

## (undated) DEVICE — DERMABOND SKIN ADH 0.7ML -- DERMABOND ADVANCED 12/BX

## (undated) DEVICE — NEEDLE HYPO 25GA L1.5IN BVL ORIENTED ECLIPSE

## (undated) DEVICE — GLOVE SURG SZ 65 THK91MIL LTX FREE SYN POLYISOPRENE

## (undated) DEVICE — SUTURE VCRL SZ 2-0 L36IN ABSRB UD L36MM CT-1 1/2 CIR J945H

## (undated) DEVICE — TRAP FLUID BUFFALO FLTR

## (undated) DEVICE — SUT ETHLN 3-0 18IN PS1 BLK --

## (undated) DEVICE — SHAFT 7480741T ADAPTER DRIVER QC SHAFT: Brand: CD HORIZON® FENESTRATED SCREW SET

## (undated) DEVICE — TOTAL TRAY, 16FR 10ML SIL FOLEY, URN: Brand: MEDLINE

## (undated) DEVICE — SET GRAV CK VLV NEEDLESS ST 3 GANGED 4WAY STPCOCK HI FLO 10

## (undated) DEVICE — OPTIFOAM GENTLE SA, POSTOP, 4X12: Brand: MEDLINE

## (undated) DEVICE — BONE WAX WHITE: Brand: BONE WAX WHITE

## (undated) DEVICE — SUTURE VCRL SZ 3-0 L27IN ABSRB UD CP-2 L26MM 1/2 CIR REV J868H

## (undated) DEVICE — DRAPE XR C ARM 41X74IN LF --